# Patient Record
Sex: MALE | Race: WHITE | NOT HISPANIC OR LATINO | Employment: OTHER | URBAN - METROPOLITAN AREA
[De-identification: names, ages, dates, MRNs, and addresses within clinical notes are randomized per-mention and may not be internally consistent; named-entity substitution may affect disease eponyms.]

---

## 2018-09-19 ENCOUNTER — OFFICE VISIT CONVERTED (OUTPATIENT)
Dept: FAMILY MEDICINE CLINIC | Facility: CLINIC | Age: 78
End: 2018-09-19
Attending: FAMILY MEDICINE

## 2018-09-19 ENCOUNTER — CONVERSION ENCOUNTER (OUTPATIENT)
Dept: FAMILY MEDICINE CLINIC | Facility: CLINIC | Age: 78
End: 2018-09-19

## 2018-12-19 ENCOUNTER — OFFICE VISIT CONVERTED (OUTPATIENT)
Dept: FAMILY MEDICINE CLINIC | Facility: CLINIC | Age: 78
End: 2018-12-19
Attending: FAMILY MEDICINE

## 2018-12-19 ENCOUNTER — CONVERSION ENCOUNTER (OUTPATIENT)
Dept: FAMILY MEDICINE CLINIC | Facility: CLINIC | Age: 78
End: 2018-12-19

## 2019-03-25 ENCOUNTER — CONVERSION ENCOUNTER (OUTPATIENT)
Dept: FAMILY MEDICINE CLINIC | Facility: CLINIC | Age: 79
End: 2019-03-25

## 2019-03-25 ENCOUNTER — OFFICE VISIT CONVERTED (OUTPATIENT)
Dept: FAMILY MEDICINE CLINIC | Facility: CLINIC | Age: 79
End: 2019-03-25
Attending: FAMILY MEDICINE

## 2019-08-07 ENCOUNTER — HOSPITAL ENCOUNTER (OUTPATIENT)
Dept: SURGERY | Facility: HOSPITAL | Age: 79
Setting detail: HOSPITAL OUTPATIENT SURGERY
Discharge: HOME | End: 2019-08-07
Attending: OPHTHALMOLOGY

## 2019-08-21 ENCOUNTER — HOSPITAL ENCOUNTER (OUTPATIENT)
Dept: SURGERY | Facility: HOSPITAL | Age: 79
Setting detail: HOSPITAL OUTPATIENT SURGERY
Discharge: HOME | End: 2019-08-21
Attending: OPHTHALMOLOGY

## 2020-07-06 ENCOUNTER — HOSPITAL ENCOUNTER (OUTPATIENT)
Dept: FAMILY MEDICINE CLINIC | Facility: CLINIC | Age: 80
Discharge: HOME | End: 2020-07-06
Attending: FAMILY MEDICINE

## 2020-07-06 ENCOUNTER — OFFICE VISIT CONVERTED (OUTPATIENT)
Dept: FAMILY MEDICINE CLINIC | Facility: CLINIC | Age: 80
End: 2020-07-06
Attending: FAMILY MEDICINE

## 2020-07-06 LAB — URATE SERPL-MCNC: 4.8 MG/DL (ref 3.5–8.5)

## 2020-07-07 LAB
ALBUMIN SERPL-MCNC: 4.5 G/DL (ref 3.5–5)
ALBUMIN/GLOB SERPL: 1.4 {RATIO} (ref 1.4–2.6)
ALP SERPL-CCNC: 73 U/L (ref 56–155)
ALT SERPL-CCNC: 23 U/L (ref 10–40)
ANION GAP SERPL CALC-SCNC: 20 MMOL/L (ref 8–19)
AST SERPL-CCNC: 20 U/L (ref 15–50)
BASOPHILS # BLD AUTO: 0.1 10*3/UL (ref 0–0.2)
BASOPHILS NFR BLD AUTO: 0.9 % (ref 0–3)
BILIRUB SERPL-MCNC: 0.69 MG/DL (ref 0.2–1.3)
BUN SERPL-MCNC: 36 MG/DL (ref 5–25)
BUN/CREAT SERPL: 18 {RATIO} (ref 6–20)
CALCIUM SERPL-MCNC: 10.2 MG/DL (ref 8.7–10.4)
CHLORIDE SERPL-SCNC: 97 MMOL/L (ref 99–111)
CHOLEST SERPL-MCNC: 148 MG/DL (ref 107–200)
CHOLEST/HDLC SERPL: 3 {RATIO} (ref 3–6)
CONV ABS IMM GRAN: 0.08 10*3/UL (ref 0–0.2)
CONV CO2: 25 MMOL/L (ref 22–32)
CONV IMMATURE GRAN: 0.7 % (ref 0–1.8)
CONV TOTAL PROTEIN: 7.8 G/DL (ref 6.3–8.2)
CREAT UR-MCNC: 1.97 MG/DL (ref 0.7–1.2)
DEPRECATED RDW RBC AUTO: 52.5 FL (ref 35.1–43.9)
EOSINOPHIL # BLD AUTO: 0.46 10*3/UL (ref 0–0.7)
EOSINOPHIL # BLD AUTO: 4.2 % (ref 0–7)
ERYTHROCYTE [DISTWIDTH] IN BLOOD BY AUTOMATED COUNT: 14.1 % (ref 11.6–14.4)
GFR SERPLBLD BASED ON 1.73 SQ M-ARVRAT: 32 ML/MIN/{1.73_M2}
GLOBULIN UR ELPH-MCNC: 3.3 G/DL (ref 2–3.5)
GLUCOSE SERPL-MCNC: 122 MG/DL (ref 70–99)
HCT VFR BLD AUTO: 46.1 % (ref 42–52)
HDLC SERPL-MCNC: 49 MG/DL (ref 40–60)
HGB BLD-MCNC: 14.8 G/DL (ref 14–18)
LDLC SERPL CALC-MCNC: 74 MG/DL (ref 70–100)
LYMPHOCYTES # BLD AUTO: 2.1 10*3/UL (ref 1–5)
LYMPHOCYTES NFR BLD AUTO: 19.1 % (ref 20–45)
MCH RBC QN AUTO: 32.5 PG (ref 27–31)
MCHC RBC AUTO-ENTMCNC: 32.1 G/DL (ref 33–37)
MCV RBC AUTO: 101.3 FL (ref 80–96)
MONOCYTES # BLD AUTO: 0.69 10*3/UL (ref 0.2–1.2)
MONOCYTES NFR BLD AUTO: 6.3 % (ref 3–10)
NEUTROPHILS # BLD AUTO: 7.56 10*3/UL (ref 2–8)
NEUTROPHILS NFR BLD AUTO: 68.8 % (ref 30–85)
NRBC CBCN: 0 % (ref 0–0.7)
OSMOLALITY SERPL CALC.SUM OF ELEC: 294 MOSM/KG (ref 273–304)
PLATELET # BLD AUTO: 245 10*3/UL (ref 130–400)
PMV BLD AUTO: 11.4 FL (ref 9.4–12.4)
POTASSIUM SERPL-SCNC: 4.5 MMOL/L (ref 3.5–5.3)
PSA SERPL-MCNC: 2.56 NG/ML (ref 0–4)
RBC # BLD AUTO: 4.55 10*6/UL (ref 4.7–6.1)
SODIUM SERPL-SCNC: 137 MMOL/L (ref 135–147)
TRIGL SERPL-MCNC: 125 MG/DL (ref 40–150)
TSH SERPL-ACNC: 1.97 M[IU]/L (ref 0.27–4.2)
VLDLC SERPL-MCNC: 25 MG/DL (ref 5–37)
WBC # BLD AUTO: 10.99 10*3/UL (ref 4.8–10.8)

## 2020-07-14 ENCOUNTER — HOSPITAL ENCOUNTER (OUTPATIENT)
Dept: ULTRASOUND IMAGING | Facility: HOSPITAL | Age: 80
Discharge: HOME | End: 2020-07-14
Attending: FAMILY MEDICINE

## 2021-03-30 ENCOUNTER — HOSPITAL ENCOUNTER (OUTPATIENT)
Dept: FAMILY MEDICINE CLINIC | Facility: CLINIC | Age: 81
Discharge: HOME | End: 2021-03-30
Attending: FAMILY MEDICINE

## 2021-03-30 ENCOUNTER — OFFICE VISIT CONVERTED (OUTPATIENT)
Dept: FAMILY MEDICINE CLINIC | Facility: CLINIC | Age: 81
End: 2021-03-30
Attending: FAMILY MEDICINE

## 2021-03-31 LAB
TSH SERPL-ACNC: 1.27 M[IU]/L (ref 0.27–4.2)
VIT B12 SERPL-MCNC: 1604 PG/ML (ref 211–911)

## 2021-04-06 ENCOUNTER — HOSPITAL ENCOUNTER (OUTPATIENT)
Dept: GENERAL RADIOLOGY | Facility: HOSPITAL | Age: 81
Discharge: HOME | End: 2021-04-06
Attending: FAMILY MEDICINE

## 2021-04-16 ENCOUNTER — HOSPITAL ENCOUNTER (OUTPATIENT)
Dept: GENERAL RADIOLOGY | Facility: HOSPITAL | Age: 81
Discharge: HOME | End: 2021-04-16
Attending: FAMILY MEDICINE

## 2021-04-16 LAB
CREAT BLD-MCNC: 1.4 MG/DL (ref 0.6–1.4)
GFR SERPLBLD BASED ON 1.73 SQ M-ARVRAT: 48 ML/MIN/{1.73_M2}

## 2021-04-24 ENCOUNTER — HOSPITAL ENCOUNTER (OUTPATIENT)
Dept: OTHER | Facility: HOSPITAL | Age: 81
Discharge: HOME | End: 2021-04-24
Attending: OTOLARYNGOLOGY

## 2021-04-30 ENCOUNTER — HOSPITAL ENCOUNTER (OUTPATIENT)
Dept: GENERAL RADIOLOGY | Facility: HOSPITAL | Age: 81
Discharge: HOME | End: 2021-04-30
Attending: OTOLARYNGOLOGY

## 2021-05-13 NOTE — PROGRESS NOTES
Progress Note      Patient Name: Clark Layton Jr   Patient ID: [id number]   Sex: Male   YOB: 1940    Primary Care Provider: David Singh III MD   Referring Provider: David Singh III MD    Visit Date: July 6, 2020    Provider: David Singh III MD   Location: [facility]   Location Address: [address] [place] [address]  [id number]   Location Phone: [phone]          Chief Complaint  · Adult General Male Physical Exam      History Of Present Illness  Clark Layton Jr is a 79 year old /White male who presents for evaluation and treatment of:   Removal of Ear Wax  Ear lavage for left ear after unsuccessful attempt of removal of wax plug with curette and/or cotton swab.   Ear flushed with warm water until wax plug flushed out. Provider re-examined ear and removed remaining wax with curette and dry cotton swab.      no medication bottles brought today      HPI     patient is a 79-year-old here for his regular checkup and yearly lab work.  Pt has history of COPD, he continues to smoke, gout and hypertension well controlled on Exforge and hydrochlorothiazide.      Review of systems     cardiovascular no chest pain no palpitation   respiratory no shortness of breath dyspnea on exertion patient does not want to stop smoking.  Respiratory no increased shortness of breath.    GI no problems with his with bleeding.  No abdominal pain.  Musculoskeletal system right cyst.   has urinary frequency at night has to get up for 5 times some nights.  For 6 to 9 months.  No dysuria.  No decreased stream.    Physical exam     weight is 160 this is a 7 pound weight loss, blood pressure is 120/58, temperature is 97, pulse ox is 93%, heart rate is 61  General no acute distress  ENT left cerumen impaction lavaged out here in the office.  Neck no adenopathy thyromegaly  Respiratory no increased work of breathing lungs clear and equal bilaterally no rales no rhonchi no  wheezes  Cardiovascular regular rhythm no murmur.  Abdomen soft nontender no liver enlargement no spleen enlargement   aorta here in the office measures 2.1 cm.  Rectal 1+ prostate hypertrophy no asymmetry no firmness that is unusual no thickening no nodules patient does have some urinary retention.  Only a small amount    Assessment/Plan    #1 hypertension well controlled   #2 COPD still continues to smoke but he does not want to quit   #3 BPH with some urinary retention needs to start both Proscar and tamsulosin will get a PSA.    #4 cerumen impaction lavaged out here in the office only on the left side     blood work is pending.       Past Medical History  Disease Name Date Onset Notes   23-polyvalent pneumococcal polysaccharide vaccine declined 12/19/2018 --    Gout --  --    High cholesterol 12/19/2018 --    Hypertension, essential --  --    Influenza vaccination declined 12/19/2018 --    Medication management 12/19/2018 --    Nicotine dependence, cigarettes, with other nicotine-induced disorders 09/19/2018 --    Pneumococcal vaccination declined 12/19/2018 --          Past Surgical History  Procedure Name Date Notes   *Denies any surgical procedures --  --          Medication List  Name Date Started Instructions   allopurinol 300 mg oral tablet 04/29/2020 TAKE ONE TABLET BY MOUTH DAILY **MUST CALL FOR APPOINTMENT FOR FURTHER REFILLS**   Exforge  mg oral tablet 06/10/2020 TAKE ONE TABLET BY MOUTH DAILY   hydrochlorothiazide 25 mg oral tablet 06/08/2020 TAKE ONE TABLET BY MOUTH DAILY         Allergy List  Allergen Name Date Reaction Notes   NO KNOWN DRUG ALLERGIES --  --  --        Allergies Reconciled  Family Medical History  Disease Name Relative/Age Notes   Brain Neoplasm, Malignant Brother/   --    Heart Disease Father/   --          Social History  Finding Status Start/Stop Quantity Notes   Active but no formal exercise --  --/-- --  --    Advance directive declined by patient --  --/-- --  --   "  Alcohol Current some day --/-- --  --    Tobacco Current every day 20/-- 1 p[k QD 10/29/2019 - patient declined LDCT scan, states feels fine does not want          Immunizations  NameDate Admin Mfg Trade Name Lot Number Route Inj VIS Given VIS Publication   Hepatitis ARefused 12/19/2018 NE Not Entered  NE NE     Comments:    InfluenzaRefused 12/19/2018 NE Not Entered  NE NE     Comments:    [id number] 12/19/2018 NE Not Entered  NE NE     Comments:    Prevnar 13Refused 12/19/2018 NE Not Entered  NE NE     Comments:    ShinglesRefused 12/19/2018 NE Not Entered  NE NE     Comments:          Review of Systems  · Constitutional  o * See HPI  · Eyes  o * See HPI  · HENT  o * See HPI  · Breasts  o * See HPI  · Cardiovascular  o * See HPI  · Respiratory  o * See HPI  · Gastrointestinal  o * See HPI  · Genitourinary  o * See HPI  · Integument  o * See HPI  · Neurologic  o * See HPI  · Musculoskeletal  o * See HPI  · Endocrine  o * See HPI  · Psychiatric  o * See HPI  · Heme-Lymph  o * See HPI  · Allergic-Immunologic  o * See HPI      Vitals  Date Time BP Position Site L\R Cuff Size HR RR TEMP (F) WT  HT  BMI kg/m2 BSA m2 O2 Sat        07/06/2020 03:46 /74 Sitting    61 - R  97.6 160lbs 0oz 5'  7\" 25.06 1.85 93 %              Results  · In-Office Procedures  o Lab procedure  § IOP - Urinalysis without Microscopy (Clinitek) [facility] ([id number])   § Color Ur: Yellow   § Clarity Ur: Clear   § Glucose Ur Ql Strip: Negative   § Bilirub Ur Ql Strip: Small   § Ketones Ur Ql Strip: Negative   § Sp Gr Ur Qn: 1.020   § Hgb Ur Ql Strip: Negative   § pH Ur-LsCnc: 5.5   § Prot Ur Ql Strip: 30 mg/dL   § Urobilinogen Ur Strip-mCnc: 0.2 E.U./dL   § Nitrite Ur Ql Strip: Negative   § WBC Est Ur Ql Strip: Trace       Assessment  · General Medical Exam, Adult (CPE)     V70.0/Z00.00  · Screening for depression     V79.0/Z13.89  · Screening for colon cancer     V76.51/Z12.11  · Screening for prostate cancer     V76.44/Z12.5  · Screening " for AAA (abdominal aortic aneurysm)     V81.2/Z13.6  · Encounter for screening for cardiovascular disorders     V81.2/Z13.6  · Annual physical exam     V70.0/Z00.00  · BPH (benign prostatic hyperplasia)     600.00/N40.0  · Fatigue     780.79/R53.83  · Gout     274.9/M10.9  · High cholesterol     272.0/E78.00  · Medication management     V58.69/Z79.899  · Nicotine dependence, cigarettes, with other nicotine-induced disorders     292.89/F17.218  · Pneumococcal vaccination declined     V64.06/Z28.21  · Hypertension, essential     401.9/I10  · Urinary urgency     788.63/R39.15  · Cerumen Impaction     380.4/H61.20    Problems Reconciled  Plan  · Orders  o ACO-18: Negative screen for clinical depression using a standardized tool () - V79.0/Z13.89 - 07/06/2020  o Cerumen Removal by MA or Nurse, Unilateral [facility] ([id number]) - 380.4/H61.20 - 07/06/2020  o ACO - Pt declines to or was not able to provide an Advance Care Plan or name a Surrogate Decision Maker (1124F) - - 07/06/2020  o Male Physical Primary Care Panel (CMP, CBC, TSH, Lipid, PSA) [facility] ([id number], 38120, , 38348, [id number]) - V58.69/Z79.899, V76.44/Z12.5, V81.2/Z13.6, 780.79/R53.83 - 07/06/2020  o ACO-39: Current medications updated and reviewed () - - 07/06/2020  o ACO-18: Negative screen for clinical depression using a standardized tool () - - 07/06/2020  o ACO-13: Fall Risk Screening with no falls in past year or only one fall without injury in the past year (1101F) - - 07/06/2020  o Uric Acid Serum [facility] ([id number]) - 274.9/M10.9, V58.69/Z79.899 - 07/06/2020  o AAA Screening. (, 91028) - 292.89/F17.218 - 07/06/2020   2.1 cm measured by ultrasound in office  · Medications  o Proscar 5 mg oral tablet   SIG: take 1 tablet (5 mg) by oral route once daily for 90 days   DISP: (90) tablets with 1 refills  Prescribed on 07/06/2020     o tamsulosin 0.4 mg oral capsule   SIG: take 1 capsule (0.4 mg) by oral route once daily 1/2 hour following the same meal each  day for 90 days   DISP: (90) capsules with 1 refills  Prescribed on 07/06/2020     o Bystolic 10 mg oral tablet   SIG: take 1/2 tablet by oral route QD for 30 days   DISP: (30) tablets with 3 refills  Discontinued on 07/06/2020     o Proventil HFA 90 mcg/actuation inhalation HFA aerosol inhaler   SIG: inhale 2 puffs by inhalation route every 3 hours prn   DISP: (1) 6.7 gm canister with 3 refills  Discontinued on 07/06/2020     o Medications have been Reconciled  o Transition of Care or Provider Policy  · Instructions  o Depression Screen completed and scanned into the EMR under the designated folder within the patient's documents.  o Today's PHQ-9 result is 1___  o CMS (Medicare) estimates that one in six persons older than 65 suffers from depression and that depression in older patients occurs in 25% of those with other medical illnesses. US Preventative Services Task Force indicates that depression screening and support improves clinical outcomes in older adults. This service is covered by Medicare once a year as part of helping maintain a healthy you!  o The provider screening met the required time of 15 minutes.  o Patient declines colorectal cancer screening. Discussed risks associated with not having screening performed.   o Reviewed health maintenance flowsheet and updated information. Orders were placed and/or patient's response was documented.  o Ear canal(s) irrigated with warm water and cerumen removed with curette.  o Patient is taking medications as prescribed and doing well.   o Take all medications as prescribed/directed.  o Patient instructed/educated on their diet and exercise program.  o Patient was educated/instructed on their diagnosis, treatment and medications prior to discharge from the clinic today.  o Bring all medicines with their bottles to each office visit.  o Time spent with the patient was 45 minutes, more than 50% face to face.  o Chronic conditions reviewed and taken into  consideration for today's treatment plan.  o Flu vaccine declined.  o Pneumonia vaccine declined.   o Discussed Covid-19 precautions including, but not limited to, social distancing, avoid touching your face, and hand washing.             Electronically Signed by: Laney Alexandre, -Author on July 6, 2020 07:02:26 PM  Electronically Co-signed by: David Singh III MD -Reviewer on July 7, 2020 05:25:06 PM

## 2021-05-14 VITALS
WEIGHT: 162 LBS | TEMPERATURE: 97.7 F | SYSTOLIC BLOOD PRESSURE: 132 MMHG | HEART RATE: 74 BPM | HEIGHT: 67 IN | DIASTOLIC BLOOD PRESSURE: 74 MMHG | OXYGEN SATURATION: 92 % | BODY MASS INDEX: 25.43 KG/M2

## 2021-05-14 NOTE — PROGRESS NOTES
Progress Note      Patient Name: Clark Layton Jr   Patient ID: [id number]   Sex: Male   YOB: 1940    Primary Care Provider: David Singh III MD    Visit Date: March 30, 2021    Provider: David Singh III MD   Location: [facility]   Location Address: [address] [place] [address]  [id number]   Location Phone: [phone]          Chief Complaint  · decreased memory      History Of Present Illness  Clark Layton Jr is a 80 year old /White male who presents for evaluation and treatment of: change in memory      HPI patient is a 80-year-old His wife is with him. Sates he forgets things has been told sometimes he pays a bill twice. Seems to be getting a little worse. But still able to function at work. Still reading, still doing things are cognitively difficult like balancing [employer] books.  Just mild cognitive neurologic disorder.  Discussed Aricept and Namenda.  Does not want to take at this time.     Review of systems    Cardiovascular no chest pain no palpitation  Respiratory no shortness of breath dyspnea on exertion discussed stopping smoking would help his cognition neurologic no focal deficits no problems with speech. Does have some problems with short term memory. Has noticed slight increase in this.     Physical exam   weight is 162 heart rate 74 temperature 97.7 blood pressure 132/74 pulse ox 92    General no distress  Psych happy and bright.  Neurologic slum score was 22.  Which is a mild cognitive neurologic disorder.  Speech is normal can converse without difficulty.  Told patient that I would send patient to the neuropsychologist if he wished.    CT scan and a TSH and a B12.      Assessment   mild cognitive neurological disorder   short term memory  Slum score 22       Plan   stop smoking   CT scan TSH and a B12   recheck in July       Past Medical History  Disease Name Date Onset Notes   23-polyvalent pneumococcal  polysaccharide vaccine declined 12/19/2018 --    Gout --  --    High cholesterol 12/19/2018 --    Hypertension, essential --  --    Influenza vaccination declined 12/19/2018 --    Medication management 12/19/2018 --    Nicotine dependence, cigarettes, with other nicotine-induced disorders 09/19/2018 --    Pneumococcal vaccination declined 12/19/2018 --          Past Surgical History  Procedure Name Date Notes   *Denies any surgical procedures --  --          Medication List  Name Date Started Instructions   allopurinol 300 mg oral tablet 08/03/2020 TAKE ONE TABLET BY MOUTH DAILY   amlodipine 10 mg oral tablet 07/16/2020 take 1 tablet (10 mg) by oral route once daily for 90 days   hydrochlorothiazide 25 mg oral tablet 09/21/2020 TAKE ONE TABLET BY MOUTH DAILY   Proscar 5 mg oral tablet 07/06/2020 take 1 tablet (5 mg) by oral route once daily for 90 days   tamsulosin 0.4 mg oral capsule 07/06/2020 take 1 capsule (0.4 mg) by oral route once daily 1/2 hour following the same meal each day for 90 days         Allergy List  Allergen Name Date Reaction Notes   NO KNOWN DRUG ALLERGIES --  --  --        Allergies Reconciled  Family Medical History  Disease Name Relative/Age Notes   Brain Neoplasm, Malignant Brother/   --    Heart Disease Father/   --          Social History  Finding Status Start/Stop Quantity Notes   Active but no formal exercise --  --/-- --  --    Advance directive declined by patient --  --/-- --  --    Alcohol Current some day --/-- --  --    Tobacco Current every day 20/-- 1 p[k QD 10/29/2019 - patient declined LDCT scan, states feels fine does not want          Immunizations  NameDate Admin Mfg Trade Name Lot Number Route Inj VIS Given VIS Publication   Hepatitis ARefused 12/19/2018 NE Not Entered  NE NE     Comments:    InfluenzaRefused 12/19/2018 NE Not Entered  NE NE     Comments:    [id number] 12/19/2018 NE Not Entered  NE NE     Comments:    Prevnar 13Refused 12/19/2018 NE Not Entered  NE  "NE     Comments:    ShinglesRefused 12/19/2018 NE Not Entered  NE NE     Comments:          Vitals  Date Time BP Position Site L\R Cuff Size HR RR TEMP (F) WT  HT  BMI kg/m2 BSA m2 O2 Sat FR L/min FiO2 HC       03/30/2021 11:50 /74 Sitting    74 - R  97.7 162lbs 0oz 5'  7\" 25.37 1.86 92 %  21%                  Assessment  · Fatigue     780.79/R53.83  · Memory changes     780.93/R41.3  · Medication management     V58.69/Z79.899  · Memory loss     780.93/R41.3      Plan  · Orders  o TSH [facility] ([id number]) - 780.93/R41.3, 780.79/R53.83, V58.69/Z79.899 - 03/30/2021  o ACO-17: Screened for tobacco use AND received tobacco cessation intervention (4004F) - - 03/30/2021  o ACO-14: Influenza immunization was not administered for reasons documented [facility] () - - 03/30/2021  o ACO-39: Current medications updated and reviewed (1159F, ) - - 03/30/2021  o B12 level (41742) - 780.93/R41.3, 780.79/R53.83, V58.69/Z79.899 - 03/30/2021  o CT Head/Brain without IV Contrast [facility] ([id number]) - 780.93/R41.3 - 03/30/2021   memory loss needs PA  · Medications  o allopurinol 300 mg oral tablet   SIG: TAKE ONE TABLET BY MOUTH DAILY   DISP: (90) Tablet with 1 refills  Refilled on 03/30/2021     o amlodipine 10 mg oral tablet   SIG: take 1 tablet (10 mg) by oral route once daily for 90 days   DISP: (90) Tablet with 1 refills  Refilled on 03/30/2021     o hydrochlorothiazide 25 mg oral tablet   SIG: TAKE ONE TABLET BY MOUTH DAILY for 90 days   DISP: (90) Tablet with 1 refills  Refilled on 03/30/2021     o Proscar 5 mg oral tablet   SIG: take 1 tablet (5 mg) by oral route once daily for 90 days   DISP: (90) Tablet with 1 refills  Refilled on 03/30/2021     o tamsulosin 0.4 mg oral capsule   SIG: take 1 capsule (0.4 mg) by oral route once daily 1/2 hour following the same meal each day for 90 days   DISP: (90) Capsule with 1 refills  Refilled on 03/30/2021     o Medications have been Reconciled  o Transition of Care or Provider " Policy  · Instructions  o Take all medications as prescribed/directed.  o Patient was educated/instructed on their diagnosis, treatment and medications prior to discharge from the clinic today.  o Patient counseled to stop smoking.  o Patient was instructed to exercise regularly.  o Call the office with any concerns or questions.  o Bring all medicines with their bottles to each office visit.  o Time spent with the patient was minutes, more than 50% face to face.            Electronically Signed by: Lisa Hsasan, -Author on March 30, 2021 05:06:24 PM  Electronically Co-signed by: David Singh III MD -Reviewer on March 30, 2021 05:59:15 PM

## 2021-05-15 VITALS
TEMPERATURE: 97.6 F | OXYGEN SATURATION: 93 % | BODY MASS INDEX: 25.11 KG/M2 | DIASTOLIC BLOOD PRESSURE: 74 MMHG | HEART RATE: 61 BPM | HEIGHT: 67 IN | SYSTOLIC BLOOD PRESSURE: 144 MMHG | WEIGHT: 160 LBS

## 2021-05-15 VITALS
DIASTOLIC BLOOD PRESSURE: 80 MMHG | BODY MASS INDEX: 26.21 KG/M2 | HEIGHT: 67 IN | WEIGHT: 167 LBS | SYSTOLIC BLOOD PRESSURE: 140 MMHG

## 2021-05-16 VITALS
HEIGHT: 67 IN | DIASTOLIC BLOOD PRESSURE: 60 MMHG | SYSTOLIC BLOOD PRESSURE: 120 MMHG | BODY MASS INDEX: 26.37 KG/M2 | WEIGHT: 168 LBS

## 2021-05-16 VITALS
WEIGHT: 165 LBS | HEIGHT: 67 IN | DIASTOLIC BLOOD PRESSURE: 78 MMHG | SYSTOLIC BLOOD PRESSURE: 142 MMHG | BODY MASS INDEX: 25.9 KG/M2

## 2021-07-21 PROBLEM — Z79.899 MEDICATION MANAGEMENT: Status: ACTIVE | Noted: 2018-12-19

## 2021-07-21 PROBLEM — F17.218 NICOTINE DEPENDENCE, CIGARETTES, WITH OTHER NICOTINE-INDUCED DISORDERS: Status: ACTIVE | Noted: 2018-09-19

## 2021-07-21 PROBLEM — E78.00 HIGH CHOLESTEROL: Status: ACTIVE | Noted: 2018-12-19

## 2021-07-21 RX ORDER — ALLOPURINOL 300 MG/1
300 TABLET ORAL DAILY
COMMUNITY
End: 2021-07-22 | Stop reason: SDUPTHER

## 2021-07-21 RX ORDER — AMLODIPINE BESYLATE 10 MG/1
10 TABLET ORAL DAILY
COMMUNITY
End: 2021-07-22 | Stop reason: SDUPTHER

## 2021-07-21 RX ORDER — FINASTERIDE 5 MG/1
5 TABLET, FILM COATED ORAL DAILY
COMMUNITY
End: 2021-07-22 | Stop reason: SDUPTHER

## 2021-07-21 RX ORDER — TAMSULOSIN HYDROCHLORIDE 0.4 MG/1
1 CAPSULE ORAL DAILY
COMMUNITY
End: 2021-07-22

## 2021-07-21 RX ORDER — HYDROCHLOROTHIAZIDE 25 MG/1
25 TABLET ORAL DAILY
COMMUNITY
End: 2021-07-22 | Stop reason: SDUPTHER

## 2021-07-22 ENCOUNTER — OFFICE VISIT (OUTPATIENT)
Dept: FAMILY MEDICINE CLINIC | Facility: CLINIC | Age: 81
End: 2021-07-22

## 2021-07-22 VITALS
TEMPERATURE: 98.3 F | BODY MASS INDEX: 24.48 KG/M2 | DIASTOLIC BLOOD PRESSURE: 60 MMHG | HEIGHT: 67 IN | HEART RATE: 88 BPM | WEIGHT: 156 LBS | SYSTOLIC BLOOD PRESSURE: 130 MMHG | OXYGEN SATURATION: 87 %

## 2021-07-22 DIAGNOSIS — Z13.6 ENCOUNTER FOR SCREENING FOR CARDIOVASCULAR DISORDERS: ICD-10-CM

## 2021-07-22 DIAGNOSIS — Z79.899 MEDICATION MANAGEMENT: ICD-10-CM

## 2021-07-22 DIAGNOSIS — Z00.00 ANNUAL PHYSICAL EXAM: Primary | ICD-10-CM

## 2021-07-22 DIAGNOSIS — I10 ESSENTIAL HYPERTENSION: ICD-10-CM

## 2021-07-22 LAB
ALBUMIN SERPL-MCNC: 4.5 G/DL (ref 3.5–5.2)
ALBUMIN/GLOB SERPL: 1.5 G/DL
ALP SERPL-CCNC: 71 U/L (ref 39–117)
ALT SERPL W P-5'-P-CCNC: 17 U/L (ref 1–41)
ANION GAP SERPL CALCULATED.3IONS-SCNC: 11.3 MMOL/L (ref 5–15)
AST SERPL-CCNC: 17 U/L (ref 1–40)
BASOPHILS # BLD AUTO: 0.09 10*3/MM3 (ref 0–0.2)
BASOPHILS NFR BLD AUTO: 0.8 % (ref 0–1.5)
BILIRUB SERPL-MCNC: 0.6 MG/DL (ref 0–1.2)
BUN SERPL-MCNC: 22 MG/DL (ref 8–23)
BUN/CREAT SERPL: 17.1 (ref 7–25)
CALCIUM SPEC-SCNC: 9.9 MG/DL (ref 8.6–10.5)
CHLORIDE SERPL-SCNC: 96 MMOL/L (ref 98–107)
CHOLEST SERPL-MCNC: 157 MG/DL (ref 0–200)
CO2 SERPL-SCNC: 29.7 MMOL/L (ref 22–29)
CREAT SERPL-MCNC: 1.29 MG/DL (ref 0.76–1.27)
DEPRECATED RDW RBC AUTO: 48.5 FL (ref 37–54)
EOSINOPHIL # BLD AUTO: 0.35 10*3/MM3 (ref 0–0.4)
EOSINOPHIL NFR BLD AUTO: 3 % (ref 0.3–6.2)
ERYTHROCYTE [DISTWIDTH] IN BLOOD BY AUTOMATED COUNT: 13.3 % (ref 12.3–15.4)
GFR SERPL CREATININE-BSD FRML MDRD: 54 ML/MIN/1.73
GLOBULIN UR ELPH-MCNC: 3.1 GM/DL
GLUCOSE SERPL-MCNC: 101 MG/DL (ref 65–99)
HCT VFR BLD AUTO: 49.4 % (ref 37.5–51)
HDLC SERPL-MCNC: 47 MG/DL (ref 40–60)
HGB BLD-MCNC: 16.4 G/DL (ref 13–17.7)
IMM GRANULOCYTES # BLD AUTO: 0.06 10*3/MM3 (ref 0–0.05)
IMM GRANULOCYTES NFR BLD AUTO: 0.5 % (ref 0–0.5)
LDLC SERPL CALC-MCNC: 94 MG/DL (ref 0–100)
LDLC/HDLC SERPL: 1.98 {RATIO}
LYMPHOCYTES # BLD AUTO: 2.21 10*3/MM3 (ref 0.7–3.1)
LYMPHOCYTES NFR BLD AUTO: 18.8 % (ref 19.6–45.3)
MCH RBC QN AUTO: 32.6 PG (ref 26.6–33)
MCHC RBC AUTO-ENTMCNC: 33.2 G/DL (ref 31.5–35.7)
MCV RBC AUTO: 98.2 FL (ref 79–97)
MONOCYTES # BLD AUTO: 0.67 10*3/MM3 (ref 0.1–0.9)
MONOCYTES NFR BLD AUTO: 5.7 % (ref 5–12)
NEUTROPHILS NFR BLD AUTO: 71.2 % (ref 42.7–76)
NEUTROPHILS NFR BLD AUTO: 8.37 10*3/MM3 (ref 1.7–7)
NRBC BLD AUTO-RTO: 0 /100 WBC (ref 0–0.2)
PLATELET # BLD AUTO: 222 10*3/MM3 (ref 140–450)
PMV BLD AUTO: 10.8 FL (ref 6–12)
POTASSIUM SERPL-SCNC: 3.7 MMOL/L (ref 3.5–5.2)
PROT SERPL-MCNC: 7.6 G/DL (ref 6–8.5)
RBC # BLD AUTO: 5.03 10*6/MM3 (ref 4.14–5.8)
SODIUM SERPL-SCNC: 137 MMOL/L (ref 136–145)
TRIGL SERPL-MCNC: 84 MG/DL (ref 0–150)
TSH SERPL DL<=0.05 MIU/L-ACNC: 1.05 UIU/ML (ref 0.27–4.2)
VLDLC SERPL-MCNC: 16 MG/DL (ref 5–40)
WBC # BLD AUTO: 11.75 10*3/MM3 (ref 3.4–10.8)

## 2021-07-22 PROCEDURE — 85025 COMPLETE CBC W/AUTO DIFF WBC: CPT | Performed by: FAMILY MEDICINE

## 2021-07-22 PROCEDURE — 99214 OFFICE O/P EST MOD 30 MIN: CPT | Performed by: FAMILY MEDICINE

## 2021-07-22 PROCEDURE — 84443 ASSAY THYROID STIM HORMONE: CPT | Performed by: FAMILY MEDICINE

## 2021-07-22 PROCEDURE — 80053 COMPREHEN METABOLIC PANEL: CPT | Performed by: FAMILY MEDICINE

## 2021-07-22 PROCEDURE — 80061 LIPID PANEL: CPT | Performed by: FAMILY MEDICINE

## 2021-07-22 RX ORDER — AMLODIPINE BESYLATE 10 MG/1
10 TABLET ORAL DAILY
Qty: 90 TABLET | Refills: 3 | Status: SHIPPED | OUTPATIENT
Start: 2021-07-22 | End: 2022-07-26

## 2021-07-22 RX ORDER — ALLOPURINOL 300 MG/1
300 TABLET ORAL DAILY
Qty: 90 TABLET | Refills: 3 | Status: SHIPPED | OUTPATIENT
Start: 2021-07-22 | End: 2022-07-27 | Stop reason: SDUPTHER

## 2021-07-22 RX ORDER — HYDROCHLOROTHIAZIDE 25 MG/1
25 TABLET ORAL DAILY
Qty: 90 TABLET | Refills: 3 | Status: SHIPPED | OUTPATIENT
Start: 2021-07-22 | End: 2022-07-27 | Stop reason: SDUPTHER

## 2021-07-22 RX ORDER — FINASTERIDE 5 MG/1
5 TABLET, FILM COATED ORAL DAILY
Qty: 90 TABLET | Refills: 3 | Status: SHIPPED | OUTPATIENT
Start: 2021-07-22 | End: 2022-07-27

## 2021-07-23 NOTE — PROGRESS NOTES
Chief Complaint  Annual Exam and Med Refill    Subjective          Clark Layton presents to [facility]  History of Present Illness  80-year-old hypertension smokes gout mild cognitive impairment hyperlipidemia here for his physical exam BPH    No Known Allergies     No past surgical history on file.    Social History     Tobacco Use   • Smoking status: Current Every Day Smoker     Packs/day: 1.00     Years: 50.00     Pack years: 50.00     Start date: 1962   • Smokeless tobacco: Never Used   • Tobacco comment: pt declines LDCT scan, states feels fine does not want   Substance Use Topics   • Alcohol use: Yes     Alcohol/week: 7.0 standard drinks     Types: 7 Shots of liquor per week     Comment: Current some day       Family History   Problem Relation Age of Onset   • Heart disease Father    • Brain cancer Brother         Health Maintenance Due   Topic Date Due   • ANNUAL PHYSICAL  Never done   • Pneumococcal Vaccine 65+ (1 of 2 - PPSV23) Never done   • TDAP/TD VACCINES (1 - Tdap) Never done   • ZOSTER VACCINE (1 of 2) Never done   • LUNG CANCER SCREENING  Never done   • HEPATITIS C SCREENING  Never done      Last Completed Colonoscopy     This patient has no relevant Health Maintenance data.          Review of Systems   Cardiovascular no chest pain no palpitations  Respiratory patient has no interest in stopping smoking no particular problems with dyspnea on exertion or shortness of breath  Musculoskeletal no ataxia gout  Neurologic wife says forgetfulness is increasing no particular problems they are unable to handle driving without any problem by the wife's history discussed driving with her discussed medicines for dementia discussed there lack of any significant success usually she nor the patient want to take extra medicine  Objective     Vitals:    07/22/21 1653   BP: 130/60   Pulse: 88   Temp: 98.3 °F (36.8 °C)   SpO2: (!) 87%   Weight: 70.8 kg (156 lb)   Height: 170.2 cm  "(67\")     Body mass index is 24.43 kg/m².      Physical Exam  General no distress does appear cognition has changed slightly  HEENT sclera and conjunctiva are clear TMs are negative no oral lesions  Neck no adenopathy no thyromegaly  Cardiovascular regular rhythm no murmur  Abdomen soft nontender no abnormal pulsations no hepatosplenomegaly  Respiratory decreased breath sounds no wheezes no rhonchi no rales no increased work of breathing  Skin no lesions  Neurologic mentation patient can answer simple questions gait is normal speech is normal  Rectal 1+ prostate hypertrophy no nodules no masses  Musculoskeletal good internal and external rotation of his hips no significant crepitations of his knees feet are negative  Result Review :              Assessment and Plan    Hypertension controlled mild cognitive impairment smokes no interested in stopping COPD due to smoking state screen shows mild BPH gout is controlled continue allopurinol continue Proscar for BPH should recheck in 6 months                Patient was given instructions and counseling regarding his condition or for health maintenance advice. Please see specific information pulled into the AVS if appropriate.     "

## 2021-10-28 ENCOUNTER — TELEPHONE (OUTPATIENT)
Dept: FAMILY MEDICINE CLINIC | Facility: CLINIC | Age: 81
End: 2021-10-28

## 2021-12-02 ENCOUNTER — TELEPHONE (OUTPATIENT)
Dept: FAMILY MEDICINE CLINIC | Facility: CLINIC | Age: 81
End: 2021-12-02

## 2021-12-02 NOTE — TELEPHONE ENCOUNTER
Caller: PATRIZIA THOMAS    Relationship: Emergency Contact    Best call back number: [phone]    What is the best time to reach you: ANYTIME    Who are you requesting to speak with (clinical staff, provider,  specific staff member): CLINICAL      What was the call regarding: THE PATIENT'S WIFE NEEDS PCP TO CALL THE PATIENT AND TELL HIM HE IS NOT ALLOWED TO DRIVE. SHE WOULD LIKE THIS TO BE DONE TODAY 12/02/2021     Do you require a callback: YES

## 2022-02-23 ENCOUNTER — OFFICE VISIT (OUTPATIENT)
Dept: FAMILY MEDICINE CLINIC | Facility: CLINIC | Age: 82
End: 2022-02-23

## 2022-02-23 VITALS
OXYGEN SATURATION: 98 % | BODY MASS INDEX: 24.01 KG/M2 | DIASTOLIC BLOOD PRESSURE: 58 MMHG | TEMPERATURE: 97.8 F | HEART RATE: 54 BPM | SYSTOLIC BLOOD PRESSURE: 130 MMHG | WEIGHT: 153 LBS | HEIGHT: 67 IN

## 2022-02-23 DIAGNOSIS — F03.90 DEMENTIA WITHOUT BEHAVIORAL DISTURBANCE, UNSPECIFIED DEMENTIA TYPE: Primary | ICD-10-CM

## 2022-02-23 PROCEDURE — 99214 OFFICE O/P EST MOD 30 MIN: CPT | Performed by: FAMILY MEDICINE

## 2022-02-23 RX ORDER — TAMSULOSIN HYDROCHLORIDE 0.4 MG/1
1 CAPSULE ORAL DAILY
COMMUNITY
End: 2022-04-18

## 2022-02-23 NOTE — PROGRESS NOTES
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    Social History     Tobacco Use   • Smoking status: Current Every Day Smoker     Packs/day: 1.00     Years: 50.00     Pack years: 50.00     Start date: 1962   • Smokeless tobacco: Never Used   • Tobacco comment: pt declines LDCT scan, states feels fine does not want   Substance Use Topics   • Alcohol use: Not Currently     Alcohol/week: 7.0 standard drinks     Types: 7 Shots of liquor per week     Comment: Current some day, wife states that he is not drinking she puts water in the vodka container but he is not aware of this.       Family History   Problem Relation Age of Onset   • Heart disease Father    • Brain cancer Brother         Health Maintenance Due   Topic Date Due   • ANNUAL PHYSICAL  Never done   • Pneumococcal Vaccine 65+ (1 of 2 - PPSV23) Never done   • TDAP/TD VACCINES (1 - Tdap) Never done   • ZOSTER VACCINE (1 of 2) Never done   • LUNG CANCER SCREENING  Never done   • HEPATITIS C SCREENING  Never done        Last Completed Colonoscopy     This patient has no relevant Health Maintenance data.          REVIEW OF SYSTEMS    Neurologic patient says he can drive and that he has no memory problems told him we can evaluate this again previously 22    OBJECTIVE  Vitals:    02/23/22 1123   BP: 130/58   Pulse: 54   Temp: 97.8 °F (36.6 °C)   SpO2: 98%   Weight: 69.4 kg (153 lb)   Height: 170.2 cm (67\")     Body mass index is 23.96 kg/m².    PHYSICAL EXAM    General no distress  Cardiovascular " regular rhythm no murmur  Lungs clear and equal bilaterally  Neurologic patient can answer simple question his slum today is 19 Ms. Jessica has dementia speech is normal gait is normal clock is normal    ASSESSMENT & PLAN  Diagnoses and all orders for this visit:    1. Dementia without behavioral disturbance, unspecified dementia type (HCC) (Primary)          Dementia Alzheimer's type mild toward moderate needs not to drive  Time spent 35 minutes      Patient was given instructions and counseling regarding his condition or for health maintenance advice. Please see specific information pulled into the AVS if appropriate.

## 2022-04-15 DIAGNOSIS — N40.0 BENIGN PROSTATIC HYPERPLASIA, UNSPECIFIED WHETHER LOWER URINARY TRACT SYMPTOMS PRESENT: Primary | ICD-10-CM

## 2022-04-18 RX ORDER — TAMSULOSIN HYDROCHLORIDE 0.4 MG/1
CAPSULE ORAL
Qty: 90 CAPSULE | Refills: 0 | Status: SHIPPED | OUTPATIENT
Start: 2022-04-18 | End: 2022-07-26

## 2022-07-26 DIAGNOSIS — N40.0 BENIGN PROSTATIC HYPERPLASIA, UNSPECIFIED WHETHER LOWER URINARY TRACT SYMPTOMS PRESENT: ICD-10-CM

## 2022-07-26 RX ORDER — TAMSULOSIN HYDROCHLORIDE 0.4 MG/1
CAPSULE ORAL
Qty: 90 CAPSULE | Refills: 0 | Status: SHIPPED | OUTPATIENT
Start: 2022-07-26 | End: 2022-07-27 | Stop reason: SDUPTHER

## 2022-07-26 RX ORDER — AMLODIPINE BESYLATE 10 MG/1
TABLET ORAL
Qty: 90 TABLET | Refills: 3 | Status: SHIPPED | OUTPATIENT
Start: 2022-07-26

## 2022-07-27 ENCOUNTER — OFFICE VISIT (OUTPATIENT)
Dept: FAMILY MEDICINE CLINIC | Facility: CLINIC | Age: 82
End: 2022-07-27

## 2022-07-27 VITALS
SYSTOLIC BLOOD PRESSURE: 138 MMHG | HEART RATE: 79 BPM | DIASTOLIC BLOOD PRESSURE: 58 MMHG | TEMPERATURE: 97.8 F | WEIGHT: 149 LBS | HEIGHT: 67 IN | OXYGEN SATURATION: 97 % | BODY MASS INDEX: 23.39 KG/M2

## 2022-07-27 DIAGNOSIS — I10 ESSENTIAL HYPERTENSION: ICD-10-CM

## 2022-07-27 DIAGNOSIS — Z12.2 ENCOUNTER FOR SCREENING FOR LUNG CANCER: ICD-10-CM

## 2022-07-27 DIAGNOSIS — M1A.9XX1 CHRONIC GOUT WITH TOPHUS, UNSPECIFIED CAUSE, UNSPECIFIED SITE: ICD-10-CM

## 2022-07-27 DIAGNOSIS — Z00.00 LABORATORY EXAM ORDERED AS PART OF ROUTINE GENERAL MEDICAL EXAMINATION: ICD-10-CM

## 2022-07-27 DIAGNOSIS — F17.218 NICOTINE DEPENDENCE, CIGARETTES, WITH OTHER NICOTINE-INDUCED DISORDERS: ICD-10-CM

## 2022-07-27 DIAGNOSIS — E78.00 HIGH CHOLESTEROL: ICD-10-CM

## 2022-07-27 DIAGNOSIS — Z13.220 ENCOUNTER FOR LIPID SCREENING FOR CARDIOVASCULAR DISEASE: ICD-10-CM

## 2022-07-27 DIAGNOSIS — Z79.899 MEDICATION MANAGEMENT: ICD-10-CM

## 2022-07-27 DIAGNOSIS — N40.0 BENIGN PROSTATIC HYPERPLASIA, UNSPECIFIED WHETHER LOWER URINARY TRACT SYMPTOMS PRESENT: ICD-10-CM

## 2022-07-27 DIAGNOSIS — F17.210 NICOTINE DEPENDENCE, CIGARETTES, UNCOMPLICATED: ICD-10-CM

## 2022-07-27 DIAGNOSIS — Z53.20 COLONOSCOPY REFUSED: ICD-10-CM

## 2022-07-27 DIAGNOSIS — Z00.00 ANNUAL PHYSICAL EXAM: Primary | ICD-10-CM

## 2022-07-27 DIAGNOSIS — Z11.59 ENCOUNTER FOR HEPATITIS C SCREENING TEST FOR LOW RISK PATIENT: ICD-10-CM

## 2022-07-27 DIAGNOSIS — Z13.6 ENCOUNTER FOR LIPID SCREENING FOR CARDIOVASCULAR DISEASE: ICD-10-CM

## 2022-07-27 LAB
ALBUMIN SERPL-MCNC: 4.5 G/DL (ref 3.5–5.2)
ALBUMIN/GLOB SERPL: 1.6 G/DL
ALP SERPL-CCNC: 79 U/L (ref 39–117)
ALT SERPL W P-5'-P-CCNC: 12 U/L (ref 1–41)
ANION GAP SERPL CALCULATED.3IONS-SCNC: 10.3 MMOL/L (ref 5–15)
AST SERPL-CCNC: 13 U/L (ref 1–40)
BASOPHILS # BLD AUTO: 0.12 10*3/MM3 (ref 0–0.2)
BASOPHILS NFR BLD AUTO: 1.2 % (ref 0–1.5)
BILIRUB BLD-MCNC: NEGATIVE MG/DL
BILIRUB SERPL-MCNC: 0.4 MG/DL (ref 0–1.2)
BUN SERPL-MCNC: 19 MG/DL (ref 8–23)
BUN/CREAT SERPL: 14.4 (ref 7–25)
CALCIUM SPEC-SCNC: 9.8 MG/DL (ref 8.6–10.5)
CHLORIDE SERPL-SCNC: 97 MMOL/L (ref 98–107)
CHOLEST SERPL-MCNC: 153 MG/DL (ref 0–200)
CLARITY, POC: CLEAR
CO2 SERPL-SCNC: 32.7 MMOL/L (ref 22–29)
COLOR UR: YELLOW
CREAT SERPL-MCNC: 1.32 MG/DL (ref 0.76–1.27)
DEPRECATED RDW RBC AUTO: 46 FL (ref 37–54)
EGFRCR SERPLBLD CKD-EPI 2021: 54.9 ML/MIN/1.73
EOSINOPHIL # BLD AUTO: 0.49 10*3/MM3 (ref 0–0.4)
EOSINOPHIL NFR BLD AUTO: 5 % (ref 0.3–6.2)
ERYTHROCYTE [DISTWIDTH] IN BLOOD BY AUTOMATED COUNT: 13.5 % (ref 12.3–15.4)
EXPIRATION DATE: ABNORMAL
GLOBULIN UR ELPH-MCNC: 2.9 GM/DL
GLUCOSE SERPL-MCNC: 119 MG/DL (ref 65–99)
GLUCOSE UR STRIP-MCNC: NEGATIVE MG/DL
HCT VFR BLD AUTO: 44.3 % (ref 37.5–51)
HCV AB SER DONR QL: NORMAL
HDLC SERPL-MCNC: 34 MG/DL (ref 40–60)
HGB BLD-MCNC: 15 G/DL (ref 13–17.7)
IMM GRANULOCYTES # BLD AUTO: 0.05 10*3/MM3 (ref 0–0.05)
IMM GRANULOCYTES NFR BLD AUTO: 0.5 % (ref 0–0.5)
KETONES UR QL: NEGATIVE
LDLC SERPL CALC-MCNC: 99 MG/DL (ref 0–100)
LDLC/HDLC SERPL: 2.85 {RATIO}
LEUKOCYTE EST, POC: NEGATIVE
LYMPHOCYTES # BLD AUTO: 2.64 10*3/MM3 (ref 0.7–3.1)
LYMPHOCYTES NFR BLD AUTO: 26.7 % (ref 19.6–45.3)
Lab: ABNORMAL
MCH RBC QN AUTO: 32.3 PG (ref 26.6–33)
MCHC RBC AUTO-ENTMCNC: 33.9 G/DL (ref 31.5–35.7)
MCV RBC AUTO: 95.3 FL (ref 79–97)
MONOCYTES # BLD AUTO: 0.6 10*3/MM3 (ref 0.1–0.9)
MONOCYTES NFR BLD AUTO: 6.1 % (ref 5–12)
NEUTROPHILS NFR BLD AUTO: 5.97 10*3/MM3 (ref 1.7–7)
NEUTROPHILS NFR BLD AUTO: 60.5 % (ref 42.7–76)
NITRITE UR-MCNC: NEGATIVE MG/ML
NRBC BLD AUTO-RTO: 0 /100 WBC (ref 0–0.2)
PH UR: 5.5 [PH] (ref 5–8)
PLATELET # BLD AUTO: 221 10*3/MM3 (ref 140–450)
PMV BLD AUTO: 10.3 FL (ref 6–12)
POTASSIUM SERPL-SCNC: 4.2 MMOL/L (ref 3.5–5.2)
PROT SERPL-MCNC: 7.4 G/DL (ref 6–8.5)
PROT UR STRIP-MCNC: ABNORMAL MG/DL
RBC # BLD AUTO: 4.65 10*6/MM3 (ref 4.14–5.8)
RBC # UR STRIP: NEGATIVE /UL
SODIUM SERPL-SCNC: 140 MMOL/L (ref 136–145)
SP GR UR: 1.02 (ref 1–1.03)
TRIGL SERPL-MCNC: 111 MG/DL (ref 0–150)
TSH SERPL DL<=0.05 MIU/L-ACNC: 1.77 UIU/ML (ref 0.27–4.2)
URATE SERPL-MCNC: 4.9 MG/DL (ref 3.4–7)
UROBILINOGEN UR QL: NORMAL
VLDLC SERPL-MCNC: 20 MG/DL (ref 5–40)
WBC NRBC COR # BLD: 9.87 10*3/MM3 (ref 3.4–10.8)

## 2022-07-27 PROCEDURE — 81003 URINALYSIS AUTO W/O SCOPE: CPT | Performed by: FAMILY MEDICINE

## 2022-07-27 PROCEDURE — 80061 LIPID PANEL: CPT | Performed by: FAMILY MEDICINE

## 2022-07-27 PROCEDURE — 84550 ASSAY OF BLOOD/URIC ACID: CPT | Performed by: FAMILY MEDICINE

## 2022-07-27 PROCEDURE — 80050 GENERAL HEALTH PANEL: CPT | Performed by: FAMILY MEDICINE

## 2022-07-27 PROCEDURE — 86803 HEPATITIS C AB TEST: CPT | Performed by: FAMILY MEDICINE

## 2022-07-27 PROCEDURE — 99214 OFFICE O/P EST MOD 30 MIN: CPT | Performed by: FAMILY MEDICINE

## 2022-07-27 RX ORDER — TAMSULOSIN HYDROCHLORIDE 0.4 MG/1
1 CAPSULE ORAL DAILY
Qty: 90 CAPSULE | Refills: 3 | Status: SHIPPED | OUTPATIENT
Start: 2022-07-27

## 2022-07-27 RX ORDER — ALLOPURINOL 300 MG/1
300 TABLET ORAL DAILY
Qty: 90 TABLET | Refills: 3 | Status: SHIPPED | OUTPATIENT
Start: 2022-07-27

## 2022-07-27 RX ORDER — HYDROCHLOROTHIAZIDE 25 MG/1
25 TABLET ORAL DAILY
Qty: 90 TABLET | Refills: 3 | Status: SHIPPED | OUTPATIENT
Start: 2022-07-27

## 2022-07-27 NOTE — PROGRESS NOTES
"Chief Complaint  No chief complaint on file.    MARÍA Layton presents to [facility]    Patient 81 years old has a moderate dementia Alzheimer's type has hypertension continues to smoke no history of gout    PAST MEDICAL HISTORY  No Known Allergies     No past surgical history on file.    Social History     Tobacco Use   • Smoking status: Current Every Day Smoker     Packs/day: 1.00     Years: 50.00     Pack years: 50.00     Start date: 1962   • Smokeless tobacco: Never Used   • Tobacco comment: pt declines LDCT scan, states feels fine does not want   Substance Use Topics   • Alcohol use: Not Currently     Alcohol/week: 7.0 standard drinks     Types: 7 Shots of liquor per week     Comment: Current some day, wife states that he is not drinking she puts water in the vodka container but he is not aware of this.       Family History   Problem Relation Age of Onset   • Heart disease Father    • Brain cancer Brother         Health Maintenance Due   Topic Date Due   • Pneumococcal Vaccine 65+ (1 - PCV) Never done   • TDAP/TD VACCINES (1 - Tdap) Never done   • ZOSTER VACCINE (1 of 2) Never done   • LUNG CANCER SCREENING  Never done   • HEPATITIS C SCREENING  Never done   • COVID-19 Vaccine (4 - Booster for Pfizer series) 04/04/2022   • LIPID PANEL  07/22/2022        Last Completed Colonoscopy     This patient has no relevant Health Maintenance data.          REVIEW OF SYSTEMS    Cardiovascular no chest pain no palpitations  Respiratory no shortness of breath or dyspnea exertion  Neurologic memory is getting worse by talking to his family he states there is no problem daughter is running the business he is not driving discussed the Alzheimer's at length with daughter and wife OBJECTIVE  Vitals:    07/27/22 1623   BP: 138/58   Pulse: 79   Temp: 97.8 °F (36.6 °C)   SpO2: 97%   Weight: 67.6 kg (149 lb)   Height: 170.2 cm (67\")     Body mass index is 23.34 kg/m².    PHYSICAL " EXAM    General no distress  Cardiovascular regular rhythm no murmur  Respiratory lungs clear and equal bilaterally  Abdomen soft nontender no hepatosplenomegaly  Skin no cancerous or precancerous lesions  Neurologic speech is normal gait is normal patient can answer simple questions    ASSESSMENT & PLAN  Diagnoses and all orders for this visit:    1. Annual physical exam (Primary)  -     TSH  -     Comprehensive Metabolic Panel  -     Lipid Panel  -     CBC & Differential  -     POC Urinalysis Dipstick, Automated  -     Uric Acid    2. Encounter for lipid screening for cardiovascular disease  -     Lipid Panel    3. Laboratory exam ordered as part of routine general medical examination  -     TSH  -     Comprehensive Metabolic Panel  -     Lipid Panel  -     CBC & Differential  -     POC Urinalysis Dipstick, Automated  -     Uric Acid  -     Hepatitis C Antibody    4. Encounter for hepatitis C screening test for low risk patient  -     Hepatitis C Antibody    5. Medication management  -     TSH  -     Comprehensive Metabolic Panel  -     Lipid Panel  -     CBC & Differential  -     POC Urinalysis Dipstick, Automated  -     Uric Acid    6. Chronic gout with tophus, unspecified cause, unspecified site  -     TSH  -     Comprehensive Metabolic Panel  -     Lipid Panel  -     CBC & Differential  -     POC Urinalysis Dipstick, Automated  -     Uric Acid    7. Essential hypertension  -     TSH  -     Comprehensive Metabolic Panel  -     Lipid Panel  -     CBC & Differential  -     POC Urinalysis Dipstick, Automated    8. High cholesterol  -     TSH  -     Comprehensive Metabolic Panel  -     Lipid Panel  -     CBC & Differential  -     POC Urinalysis Dipstick, Automated    9. Nicotine dependence, cigarettes, with other nicotine-induced disorders  -     TSH  -     Comprehensive Metabolic Panel  -     Lipid Panel  -     CBC & Differential  -     POC Urinalysis Dipstick, Automated  -     CT chest low dose wo; Future    10.  Benign prostatic hyperplasia, unspecified whether lower urinary tract symptoms present  -     tamsulosin (FLOMAX) 0.4 MG capsule 24 hr capsule; Take 1 capsule by mouth Daily.  Dispense: 90 capsule; Refill: 3    11. Encounter for screening for lung cancer  -     CT chest low dose wo; Future    12. Nicotine dependence, cigarettes, uncomplicated  -     CT chest low dose wo; Future    13. Colonoscopy refused    Other orders  -     allopurinol (ZYLOPRIM) 300 MG tablet; Take 1 tablet by mouth Daily.  Dispense: 90 tablet; Refill: 3  -     hydroCHLOROthiazide (HYDRODIURIL) 25 MG tablet; Take 1 tablet by mouth Daily.  Dispense: 90 tablet; Refill: 3          Hypertension controlled dementia Alzheimer's type moderate towards severe gout well-controlled patient is a DO NOT RESUSCITATE family understands            Patient was given instructions and counseling regarding his condition or for health maintenance advice. Please see specific information pulled into the AVS if appropriate.

## 2022-08-16 ENCOUNTER — HOSPITAL ENCOUNTER (OUTPATIENT)
Dept: CT IMAGING | Facility: HOSPITAL | Age: 82
Discharge: HOME | End: 2022-08-16
Admitting: FAMILY MEDICINE

## 2022-08-16 DIAGNOSIS — Z12.2 ENCOUNTER FOR SCREENING FOR LUNG CANCER: ICD-10-CM

## 2022-08-16 DIAGNOSIS — F17.218 NICOTINE DEPENDENCE, CIGARETTES, WITH OTHER NICOTINE-INDUCED DISORDERS: ICD-10-CM

## 2022-08-16 DIAGNOSIS — F17.210 NICOTINE DEPENDENCE, CIGARETTES, UNCOMPLICATED: ICD-10-CM

## 2022-08-16 PROCEDURE — 71271 CT THORAX LUNG CANCER SCR C-: CPT

## 2023-07-24 RX ORDER — AMLODIPINE BESYLATE 10 MG/1
TABLET ORAL
Qty: 90 TABLET | Refills: 0 | Status: SHIPPED | OUTPATIENT
Start: 2023-07-24 | End: 2023-07-31 | Stop reason: SDUPTHER

## 2023-07-31 ENCOUNTER — OFFICE VISIT (OUTPATIENT)
Dept: FAMILY MEDICINE CLINIC | Facility: CLINIC | Age: 83
End: 2023-07-31
Payer: MEDICARE

## 2023-07-31 VITALS
WEIGHT: 148 LBS | HEART RATE: 73 BPM | BODY MASS INDEX: 23.23 KG/M2 | OXYGEN SATURATION: 94 % | HEIGHT: 67 IN | TEMPERATURE: 97.7 F | DIASTOLIC BLOOD PRESSURE: 60 MMHG | SYSTOLIC BLOOD PRESSURE: 136 MMHG

## 2023-07-31 DIAGNOSIS — Z79.899 MEDICATION MANAGEMENT: Primary | ICD-10-CM

## 2023-07-31 DIAGNOSIS — N40.0 BENIGN PROSTATIC HYPERPLASIA, UNSPECIFIED WHETHER LOWER URINARY TRACT SYMPTOMS PRESENT: ICD-10-CM

## 2023-07-31 DIAGNOSIS — G31.84 MILD COGNITIVE IMPAIRMENT WITH MEMORY LOSS: ICD-10-CM

## 2023-07-31 DIAGNOSIS — H61.21 IMPACTED CERUMEN OF RIGHT EAR: ICD-10-CM

## 2023-07-31 DIAGNOSIS — E78.00 HIGH CHOLESTEROL: ICD-10-CM

## 2023-07-31 DIAGNOSIS — I10 ESSENTIAL HYPERTENSION: ICD-10-CM

## 2023-07-31 DIAGNOSIS — Z00.00 ROUTINE ADULT HEALTH MAINTENANCE: ICD-10-CM

## 2023-07-31 DIAGNOSIS — M1A.9XX0 CHRONIC GOUT WITHOUT TOPHUS, UNSPECIFIED CAUSE, UNSPECIFIED SITE: ICD-10-CM

## 2023-07-31 DIAGNOSIS — L60.3 DYSTROPHIC NAIL: ICD-10-CM

## 2023-07-31 DIAGNOSIS — F17.210 NICOTINE DEPENDENCE, CIGARETTES, UNCOMPLICATED: ICD-10-CM

## 2023-07-31 LAB
ALBUMIN SERPL-MCNC: 4.4 G/DL (ref 3.5–5.2)
ALBUMIN/GLOB SERPL: 1.2 G/DL
ALP SERPL-CCNC: 79 U/L (ref 39–117)
ALT SERPL W P-5'-P-CCNC: 6 U/L (ref 1–41)
ANION GAP SERPL CALCULATED.3IONS-SCNC: 12 MMOL/L (ref 5–15)
AST SERPL-CCNC: 12 U/L (ref 1–40)
BASOPHILS # BLD AUTO: 0.12 10*3/MM3 (ref 0–0.2)
BASOPHILS NFR BLD AUTO: 1.2 % (ref 0–1.5)
BILIRUB SERPL-MCNC: 0.4 MG/DL (ref 0–1.2)
BUN SERPL-MCNC: 22 MG/DL (ref 8–23)
BUN/CREAT SERPL: 15.2 (ref 7–25)
CALCIUM SPEC-SCNC: 10.2 MG/DL (ref 8.6–10.5)
CHLORIDE SERPL-SCNC: 94 MMOL/L (ref 98–107)
CHOLEST SERPL-MCNC: 163 MG/DL (ref 0–200)
CO2 SERPL-SCNC: 31 MMOL/L (ref 22–29)
CREAT SERPL-MCNC: 1.45 MG/DL (ref 0.76–1.27)
DEPRECATED RDW RBC AUTO: 45.2 FL (ref 37–54)
EGFRCR SERPLBLD CKD-EPI 2021: 48.7 ML/MIN/1.73
EOSINOPHIL # BLD AUTO: 0.32 10*3/MM3 (ref 0–0.4)
EOSINOPHIL NFR BLD AUTO: 3.2 % (ref 0.3–6.2)
ERYTHROCYTE [DISTWIDTH] IN BLOOD BY AUTOMATED COUNT: 13.5 % (ref 12.3–15.4)
GLOBULIN UR ELPH-MCNC: 3.6 GM/DL
GLUCOSE SERPL-MCNC: 133 MG/DL (ref 65–99)
HCT VFR BLD AUTO: 46 % (ref 37.5–51)
HDLC SERPL-MCNC: 33 MG/DL (ref 40–60)
HGB BLD-MCNC: 15.2 G/DL (ref 13–17.7)
IMM GRANULOCYTES # BLD AUTO: 0.05 10*3/MM3 (ref 0–0.05)
IMM GRANULOCYTES NFR BLD AUTO: 0.5 % (ref 0–0.5)
LDLC SERPL CALC-MCNC: 107 MG/DL (ref 0–100)
LDLC/HDLC SERPL: 3.18 {RATIO}
LYMPHOCYTES # BLD AUTO: 2.32 10*3/MM3 (ref 0.7–3.1)
LYMPHOCYTES NFR BLD AUTO: 23.6 % (ref 19.6–45.3)
MCH RBC QN AUTO: 30.6 PG (ref 26.6–33)
MCHC RBC AUTO-ENTMCNC: 33 G/DL (ref 31.5–35.7)
MCV RBC AUTO: 92.7 FL (ref 79–97)
MONOCYTES # BLD AUTO: 0.54 10*3/MM3 (ref 0.1–0.9)
MONOCYTES NFR BLD AUTO: 5.5 % (ref 5–12)
NEUTROPHILS NFR BLD AUTO: 6.5 10*3/MM3 (ref 1.7–7)
NEUTROPHILS NFR BLD AUTO: 66 % (ref 42.7–76)
NRBC BLD AUTO-RTO: 0.1 /100 WBC (ref 0–0.2)
PLATELET # BLD AUTO: 254 10*3/MM3 (ref 140–450)
PMV BLD AUTO: 10.9 FL (ref 6–12)
POTASSIUM SERPL-SCNC: 4.2 MMOL/L (ref 3.5–5.2)
PROT SERPL-MCNC: 8 G/DL (ref 6–8.5)
RBC # BLD AUTO: 4.96 10*6/MM3 (ref 4.14–5.8)
SODIUM SERPL-SCNC: 137 MMOL/L (ref 136–145)
TRIGL SERPL-MCNC: 126 MG/DL (ref 0–150)
TSH SERPL DL<=0.05 MIU/L-ACNC: 1.74 UIU/ML (ref 0.27–4.2)
URATE SERPL-MCNC: 5.1 MG/DL (ref 3.4–7)
VLDLC SERPL-MCNC: 23 MG/DL (ref 5–40)
WBC NRBC COR # BLD: 9.85 10*3/MM3 (ref 3.4–10.8)

## 2023-07-31 PROCEDURE — 84443 ASSAY THYROID STIM HORMONE: CPT | Performed by: FAMILY MEDICINE

## 2023-07-31 PROCEDURE — 36415 COLL VENOUS BLD VENIPUNCTURE: CPT | Performed by: FAMILY MEDICINE

## 2023-07-31 PROCEDURE — 3078F DIAST BP <80 MM HG: CPT | Performed by: FAMILY MEDICINE

## 2023-07-31 PROCEDURE — 69210 REMOVE IMPACTED EAR WAX UNI: CPT | Performed by: FAMILY MEDICINE

## 2023-07-31 PROCEDURE — 80061 LIPID PANEL: CPT | Performed by: FAMILY MEDICINE

## 2023-07-31 PROCEDURE — 84550 ASSAY OF BLOOD/URIC ACID: CPT | Performed by: FAMILY MEDICINE

## 2023-07-31 PROCEDURE — G0127 TRIM NAIL(S): HCPCS | Performed by: FAMILY MEDICINE

## 2023-07-31 PROCEDURE — 80053 COMPREHEN METABOLIC PANEL: CPT | Performed by: FAMILY MEDICINE

## 2023-07-31 PROCEDURE — 3075F SYST BP GE 130 - 139MM HG: CPT | Performed by: FAMILY MEDICINE

## 2023-07-31 PROCEDURE — 99214 OFFICE O/P EST MOD 30 MIN: CPT | Performed by: FAMILY MEDICINE

## 2023-07-31 PROCEDURE — 85025 COMPLETE CBC W/AUTO DIFF WBC: CPT | Performed by: FAMILY MEDICINE

## 2023-07-31 RX ORDER — ALLOPURINOL 300 MG/1
300 TABLET ORAL DAILY
Qty: 90 TABLET | Refills: 3 | Status: SHIPPED | OUTPATIENT
Start: 2023-07-31

## 2023-07-31 RX ORDER — HYDROCHLOROTHIAZIDE 25 MG/1
25 TABLET ORAL DAILY
Qty: 90 TABLET | Refills: 3 | Status: SHIPPED | OUTPATIENT
Start: 2023-07-31

## 2023-07-31 RX ORDER — AMLODIPINE BESYLATE 10 MG/1
10 TABLET ORAL DAILY
Qty: 90 TABLET | Refills: 3 | Status: SHIPPED | OUTPATIENT
Start: 2023-07-31

## 2023-07-31 RX ORDER — TAMSULOSIN HYDROCHLORIDE 0.4 MG/1
1 CAPSULE ORAL DAILY
Qty: 90 CAPSULE | Refills: 3 | Status: SHIPPED | OUTPATIENT
Start: 2023-07-31

## 2024-01-01 ENCOUNTER — APPOINTMENT (OUTPATIENT)
Dept: GENERAL RADIOLOGY | Facility: HOSPITAL | Age: 84
End: 2024-01-01
Payer: MEDICARE

## 2024-01-01 ENCOUNTER — HOSPITAL ENCOUNTER (OUTPATIENT)
Facility: HOSPITAL | Age: 84
Setting detail: OBSERVATION
End: 2024-05-01
Attending: EMERGENCY MEDICINE | Admitting: FAMILY MEDICINE
Payer: MEDICARE

## 2024-01-01 VITALS
OXYGEN SATURATION: 76 % | BODY MASS INDEX: 21.63 KG/M2 | HEIGHT: 67 IN | DIASTOLIC BLOOD PRESSURE: 44 MMHG | RESPIRATION RATE: 16 BRPM | TEMPERATURE: 98.2 F | SYSTOLIC BLOOD PRESSURE: 93 MMHG | HEART RATE: 109 BPM | WEIGHT: 137.79 LBS

## 2024-01-01 DIAGNOSIS — K92.2 LOWER GI BLEED: Primary | ICD-10-CM

## 2024-01-01 LAB
ABO GROUP BLD: NORMAL
ABO GROUP BLD: NORMAL
ALBUMIN SERPL-MCNC: 3 G/DL (ref 3.5–5.2)
ALBUMIN/GLOB SERPL: 0.8 G/DL
ALP SERPL-CCNC: 82 U/L (ref 39–117)
ALT SERPL W P-5'-P-CCNC: 10 U/L (ref 1–41)
ANION GAP SERPL CALCULATED.3IONS-SCNC: 9.4 MMOL/L (ref 5–15)
ANISOCYTOSIS BLD QL: NORMAL
AST SERPL-CCNC: 14 U/L (ref 1–40)
BACTERIA UR QL AUTO: ABNORMAL /HPF
BASOPHILS # BLD AUTO: 0.08 10*3/MM3 (ref 0–0.2)
BASOPHILS NFR BLD AUTO: 0.5 % (ref 0–1.5)
BILIRUB SERPL-MCNC: 0.3 MG/DL (ref 0–1.2)
BILIRUB UR QL STRIP: NEGATIVE
BLD GP AB SCN SERPL QL: NEGATIVE
BUN SERPL-MCNC: 28 MG/DL (ref 8–23)
BUN/CREAT SERPL: 18.4 (ref 7–25)
BURR CELLS BLD QL SMEAR: NORMAL
CALCIUM SPEC-SCNC: 8.8 MG/DL (ref 8.6–10.5)
CHLORIDE SERPL-SCNC: 103 MMOL/L (ref 98–107)
CLARITY UR: CLEAR
CO2 SERPL-SCNC: 26.6 MMOL/L (ref 22–29)
COLOR UR: ABNORMAL
CREAT SERPL-MCNC: 1.52 MG/DL (ref 0.76–1.27)
D-LACTATE SERPL-SCNC: 1.5 MMOL/L (ref 0.5–2)
DEPRECATED RDW RBC AUTO: 57.9 FL (ref 37–54)
EGFRCR SERPLBLD CKD-EPI 2021: 45.7 ML/MIN/1.73
EOSINOPHIL # BLD AUTO: 0.03 10*3/MM3 (ref 0–0.4)
EOSINOPHIL NFR BLD AUTO: 0.2 % (ref 0.3–6.2)
ERYTHROCYTE [DISTWIDTH] IN BLOOD BY AUTOMATED COUNT: 14.8 % (ref 12.3–15.4)
GLOBULIN UR ELPH-MCNC: 3.7 GM/DL
GLUCOSE SERPL-MCNC: 150 MG/DL (ref 65–99)
GLUCOSE UR STRIP-MCNC: NEGATIVE MG/DL
HCT VFR BLD AUTO: 32.2 % (ref 37.5–51)
HGB BLD-MCNC: 9.6 G/DL (ref 13–17.7)
HGB UR QL STRIP.AUTO: ABNORMAL
HOLD SPECIMEN: NORMAL
HOLD SPECIMEN: NORMAL
HYALINE CASTS UR QL AUTO: ABNORMAL /LPF
IMM GRANULOCYTES # BLD AUTO: 0.14 10*3/MM3 (ref 0–0.05)
IMM GRANULOCYTES NFR BLD AUTO: 0.9 % (ref 0–0.5)
INR PPP: 1.32 (ref 0.86–1.15)
KETONES UR QL STRIP: NEGATIVE
LARGE PLATELETS: NORMAL
LEUKOCYTE ESTERASE UR QL STRIP.AUTO: ABNORMAL
LYMPHOCYTES # BLD AUTO: 1.4 10*3/MM3 (ref 0.7–3.1)
LYMPHOCYTES NFR BLD AUTO: 8.9 % (ref 19.6–45.3)
MACROCYTES BLD QL SMEAR: NORMAL
MCH RBC QN AUTO: 31.8 PG (ref 26.6–33)
MCHC RBC AUTO-ENTMCNC: 29.8 G/DL (ref 31.5–35.7)
MCV RBC AUTO: 106.6 FL (ref 79–97)
MONOCYTES # BLD AUTO: 0.89 10*3/MM3 (ref 0.1–0.9)
MONOCYTES NFR BLD AUTO: 5.6 % (ref 5–12)
NEUTROPHILS NFR BLD AUTO: 13.24 10*3/MM3 (ref 1.7–7)
NEUTROPHILS NFR BLD AUTO: 83.9 % (ref 42.7–76)
NITRITE UR QL STRIP: NEGATIVE
NRBC BLD AUTO-RTO: 0.1 /100 WBC (ref 0–0.2)
NT-PROBNP SERPL-MCNC: 774.5 PG/ML (ref 0–1800)
PH UR STRIP.AUTO: 5.5 [PH] (ref 5–8)
PLATELET # BLD AUTO: 187 10*3/MM3 (ref 140–450)
PMV BLD AUTO: 11.8 FL (ref 6–12)
POTASSIUM SERPL-SCNC: 4.1 MMOL/L (ref 3.5–5.2)
PROT SERPL-MCNC: 6.7 G/DL (ref 6–8.5)
PROT UR QL STRIP: ABNORMAL
PROTHROMBIN TIME: 16.6 SECONDS (ref 11.8–14.9)
RBC # BLD AUTO: 3.02 10*6/MM3 (ref 4.14–5.8)
RBC # UR STRIP: ABNORMAL /HPF
REF LAB TEST METHOD: ABNORMAL
RH BLD: POSITIVE
RH BLD: POSITIVE
SMALL PLATELETS BLD QL SMEAR: ADEQUATE
SODIUM SERPL-SCNC: 139 MMOL/L (ref 136–145)
SP GR UR STRIP: 1.02 (ref 1–1.03)
SQUAMOUS #/AREA URNS HPF: ABNORMAL /HPF
T&S EXPIRATION DATE: NORMAL
UROBILINOGEN UR QL STRIP: ABNORMAL
WBC # UR STRIP: ABNORMAL /HPF
WBC MORPH BLD: NORMAL
WBC NRBC COR # BLD AUTO: 15.78 10*3/MM3 (ref 3.4–10.8)
WHOLE BLOOD HOLD COAG: NORMAL
WHOLE BLOOD HOLD SPECIMEN: NORMAL

## 2024-01-01 PROCEDURE — 96374 THER/PROPH/DIAG INJ IV PUSH: CPT

## 2024-01-01 PROCEDURE — 94640 AIRWAY INHALATION TREATMENT: CPT

## 2024-01-01 PROCEDURE — 99222 1ST HOSP IP/OBS MODERATE 55: CPT | Performed by: FAMILY MEDICINE

## 2024-01-01 PROCEDURE — G0378 HOSPITAL OBSERVATION PER HR: HCPCS

## 2024-01-01 PROCEDURE — 81001 URINALYSIS AUTO W/SCOPE: CPT | Performed by: INTERNAL MEDICINE

## 2024-01-01 PROCEDURE — 94664 DEMO&/EVAL PT USE INHALER: CPT

## 2024-01-01 PROCEDURE — 94799 UNLISTED PULMONARY SVC/PX: CPT

## 2024-01-01 PROCEDURE — 85025 COMPLETE CBC W/AUTO DIFF WBC: CPT | Performed by: EMERGENCY MEDICINE

## 2024-01-01 PROCEDURE — 87040 BLOOD CULTURE FOR BACTERIA: CPT | Performed by: INTERNAL MEDICINE

## 2024-01-01 PROCEDURE — 86900 BLOOD TYPING SEROLOGIC ABO: CPT

## 2024-01-01 PROCEDURE — 99285 EMERGENCY DEPT VISIT HI MDM: CPT

## 2024-01-01 PROCEDURE — 25010000002 MORPHINE PER 10 MG: Performed by: INTERNAL MEDICINE

## 2024-01-01 PROCEDURE — 83605 ASSAY OF LACTIC ACID: CPT | Performed by: EMERGENCY MEDICINE

## 2024-01-01 PROCEDURE — 25810000003 LACTATED RINGERS PER 1000 ML: Performed by: FAMILY MEDICINE

## 2024-01-01 PROCEDURE — 96361 HYDRATE IV INFUSION ADD-ON: CPT

## 2024-01-01 PROCEDURE — 85610 PROTHROMBIN TIME: CPT | Performed by: FAMILY MEDICINE

## 2024-01-01 PROCEDURE — 51702 INSERT TEMP BLADDER CATH: CPT

## 2024-01-01 PROCEDURE — 86850 RBC ANTIBODY SCREEN: CPT | Performed by: EMERGENCY MEDICINE

## 2024-01-01 PROCEDURE — 25010000002 LORAZEPAM PER 2 MG: Performed by: INTERNAL MEDICINE

## 2024-01-01 PROCEDURE — 36415 COLL VENOUS BLD VENIPUNCTURE: CPT

## 2024-01-01 PROCEDURE — 83880 ASSAY OF NATRIURETIC PEPTIDE: CPT | Performed by: INTERNAL MEDICINE

## 2024-01-01 PROCEDURE — 96375 TX/PRO/DX INJ NEW DRUG ADDON: CPT

## 2024-01-01 PROCEDURE — 85007 BL SMEAR W/DIFF WBC COUNT: CPT | Performed by: EMERGENCY MEDICINE

## 2024-01-01 PROCEDURE — 96376 TX/PRO/DX INJ SAME DRUG ADON: CPT

## 2024-01-01 PROCEDURE — 99232 SBSQ HOSP IP/OBS MODERATE 35: CPT | Performed by: INTERNAL MEDICINE

## 2024-01-01 PROCEDURE — 86901 BLOOD TYPING SEROLOGIC RH(D): CPT | Performed by: EMERGENCY MEDICINE

## 2024-01-01 PROCEDURE — 99291 CRITICAL CARE FIRST HOUR: CPT | Performed by: INTERNAL MEDICINE

## 2024-01-01 PROCEDURE — 86900 BLOOD TYPING SEROLOGIC ABO: CPT | Performed by: EMERGENCY MEDICINE

## 2024-01-01 PROCEDURE — 86901 BLOOD TYPING SEROLOGIC RH(D): CPT

## 2024-01-01 PROCEDURE — 80053 COMPREHEN METABOLIC PANEL: CPT | Performed by: EMERGENCY MEDICINE

## 2024-01-01 RX ORDER — TAMSULOSIN HYDROCHLORIDE 0.4 MG/1
0.4 CAPSULE ORAL DAILY
Status: DISCONTINUED | OUTPATIENT
Start: 2024-01-01 | End: 2024-01-01

## 2024-01-01 RX ORDER — ATROPINE SULFATE 10 MG/ML
2 SOLUTION/ DROPS OPHTHALMIC
Status: DISCONTINUED | OUTPATIENT
Start: 2024-01-01 | End: 2024-01-01 | Stop reason: HOSPADM

## 2024-01-01 RX ORDER — MEMANTINE HYDROCHLORIDE 5 MG/1
10 TABLET ORAL 2 TIMES DAILY
COMMUNITY

## 2024-01-01 RX ORDER — AMOXICILLIN 250 MG
2 CAPSULE ORAL DAILY
COMMUNITY

## 2024-01-01 RX ORDER — MORPHINE SULFATE 10 MG/.5ML
10 SOLUTION ORAL 4 TIMES DAILY
Status: DISCONTINUED | OUTPATIENT
Start: 2024-01-01 | End: 2024-01-01 | Stop reason: HOSPADM

## 2024-01-01 RX ORDER — ALLOPURINOL 300 MG/1
300 TABLET ORAL DAILY
Status: DISCONTINUED | OUTPATIENT
Start: 2024-01-01 | End: 2024-01-01

## 2024-01-01 RX ORDER — BISACODYL 10 MG
10 SUPPOSITORY, RECTAL RECTAL DAILY PRN
Status: DISCONTINUED | OUTPATIENT
Start: 2024-01-01 | End: 2024-01-01 | Stop reason: HOSPADM

## 2024-01-01 RX ORDER — LORAZEPAM 2 MG/ML
0.5 INJECTION INTRAMUSCULAR
Status: DISCONTINUED | OUTPATIENT
Start: 2024-01-01 | End: 2024-01-01 | Stop reason: HOSPADM

## 2024-01-01 RX ORDER — BISACODYL 5 MG/1
5 TABLET, DELAYED RELEASE ORAL DAILY PRN
Status: DISCONTINUED | OUTPATIENT
Start: 2024-01-01 | End: 2024-01-01 | Stop reason: HOSPADM

## 2024-01-01 RX ORDER — LORAZEPAM 2 MG/ML
0.5 CONCENTRATE ORAL
Status: DISCONTINUED | OUTPATIENT
Start: 2024-01-01 | End: 2024-01-01 | Stop reason: HOSPADM

## 2024-01-01 RX ORDER — MORPHINE SULFATE 2 MG/ML
1 INJECTION, SOLUTION INTRAMUSCULAR; INTRAVENOUS EVERY 4 HOURS PRN
Status: DISCONTINUED | OUTPATIENT
Start: 2024-01-01 | End: 2024-01-01

## 2024-01-01 RX ORDER — BUDESONIDE 0.5 MG/2ML
0.5 INHALANT ORAL
Status: DISCONTINUED | OUTPATIENT
Start: 2024-01-01 | End: 2024-01-01

## 2024-01-01 RX ORDER — ALUMINA, MAGNESIA, AND SIMETHICONE 2400; 2400; 240 MG/30ML; MG/30ML; MG/30ML
15 SUSPENSION ORAL EVERY 6 HOURS PRN
Status: DISCONTINUED | OUTPATIENT
Start: 2024-01-01 | End: 2024-01-01 | Stop reason: HOSPADM

## 2024-01-01 RX ORDER — POLYETHYLENE GLYCOL 3350 17 G/17G
17 POWDER, FOR SOLUTION ORAL DAILY PRN
Status: DISCONTINUED | OUTPATIENT
Start: 2024-01-01 | End: 2024-01-01

## 2024-01-01 RX ORDER — HYDROXYZINE HYDROCHLORIDE 25 MG/1
25 TABLET, FILM COATED ORAL 3 TIMES DAILY PRN
Status: DISCONTINUED | OUTPATIENT
Start: 2024-01-01 | End: 2024-01-01 | Stop reason: HOSPADM

## 2024-01-01 RX ORDER — LORAZEPAM 2 MG/ML
1 CONCENTRATE ORAL 4 TIMES DAILY
Status: DISCONTINUED | OUTPATIENT
Start: 2024-01-01 | End: 2024-01-01 | Stop reason: HOSPADM

## 2024-01-01 RX ORDER — CHOLECALCIFEROL (VITAMIN D3) 125 MCG
5 CAPSULE ORAL NIGHTLY PRN
Status: DISCONTINUED | OUTPATIENT
Start: 2024-01-01 | End: 2024-01-01 | Stop reason: HOSPADM

## 2024-01-01 RX ORDER — LORAZEPAM 0.5 MG/1
0.5 TABLET ORAL 2 TIMES DAILY
Status: DISCONTINUED | OUTPATIENT
Start: 2024-01-01 | End: 2024-01-01

## 2024-01-01 RX ORDER — NALOXONE HCL 0.4 MG/ML
0.4 VIAL (ML) INJECTION
Status: DISCONTINUED | OUTPATIENT
Start: 2024-01-01 | End: 2024-01-01

## 2024-01-01 RX ORDER — CEFTRIAXONE 1 G/1
1 INJECTION, POWDER, FOR SOLUTION INTRAMUSCULAR; INTRAVENOUS DAILY
Status: DISCONTINUED | OUTPATIENT
Start: 2024-01-01 | End: 2024-01-01

## 2024-01-01 RX ORDER — HYDROCORTISONE ACETATE 25 MG/1
25 SUPPOSITORY RECTAL 2 TIMES DAILY
Status: DISCONTINUED | OUTPATIENT
Start: 2024-01-01 | End: 2024-01-01

## 2024-01-01 RX ORDER — MEMANTINE HYDROCHLORIDE 10 MG/1
10 TABLET ORAL 2 TIMES DAILY
Status: DISCONTINUED | OUTPATIENT
Start: 2024-01-01 | End: 2024-01-01

## 2024-01-01 RX ORDER — SODIUM CHLORIDE 0.9 % (FLUSH) 0.9 %
10 SYRINGE (ML) INJECTION AS NEEDED
Status: DISCONTINUED | OUTPATIENT
Start: 2024-01-01 | End: 2024-01-01 | Stop reason: HOSPADM

## 2024-01-01 RX ORDER — ONDANSETRON 2 MG/ML
4 INJECTION INTRAMUSCULAR; INTRAVENOUS EVERY 4 HOURS PRN
Status: DISCONTINUED | OUTPATIENT
Start: 2024-01-01 | End: 2024-01-01 | Stop reason: HOSPADM

## 2024-01-01 RX ORDER — SODIUM CHLORIDE, SODIUM LACTATE, POTASSIUM CHLORIDE, CALCIUM CHLORIDE 600; 310; 30; 20 MG/100ML; MG/100ML; MG/100ML; MG/100ML
100 INJECTION, SOLUTION INTRAVENOUS CONTINUOUS
Status: DISCONTINUED | OUTPATIENT
Start: 2024-01-01 | End: 2024-01-01

## 2024-01-01 RX ORDER — HALOPERIDOL 2 MG/ML
0.5 SOLUTION ORAL
Status: DISCONTINUED | OUTPATIENT
Start: 2024-01-01 | End: 2024-01-01 | Stop reason: HOSPADM

## 2024-01-01 RX ORDER — CEFTRIAXONE 1 G/1
1 INJECTION, POWDER, FOR SOLUTION INTRAMUSCULAR; INTRAVENOUS DAILY
COMMUNITY
Start: 2024-01-01 | End: 2024-01-01

## 2024-01-01 RX ORDER — LORAZEPAM 0.5 MG/1
0.5 TABLET ORAL 2 TIMES DAILY
COMMUNITY

## 2024-01-01 RX ORDER — SODIUM CHLORIDE 0.9 % (FLUSH) 0.9 %
10 SYRINGE (ML) INJECTION EVERY 12 HOURS SCHEDULED
Status: DISCONTINUED | OUTPATIENT
Start: 2024-01-01 | End: 2024-01-01

## 2024-01-01 RX ORDER — HALOPERIDOL 5 MG/ML
0.5 INJECTION INTRAMUSCULAR
Status: DISCONTINUED | OUTPATIENT
Start: 2024-01-01 | End: 2024-01-01 | Stop reason: HOSPADM

## 2024-01-01 RX ORDER — MORPHINE SULFATE 20 MG/ML
10 SOLUTION ORAL
Status: DISCONTINUED | OUTPATIENT
Start: 2024-01-01 | End: 2024-01-01 | Stop reason: HOSPADM

## 2024-01-01 RX ORDER — LIDOCAINE 4 G/G
1 PATCH TOPICAL DAILY PRN
Status: DISCONTINUED | OUTPATIENT
Start: 2024-01-01 | End: 2024-01-01 | Stop reason: HOSPADM

## 2024-01-01 RX ORDER — ARFORMOTEROL TARTRATE 15 UG/2ML
15 SOLUTION RESPIRATORY (INHALATION)
Status: DISCONTINUED | OUTPATIENT
Start: 2024-01-01 | End: 2024-01-01

## 2024-01-01 RX ORDER — LORAZEPAM 0.5 MG/1
0.5 TABLET ORAL
Status: DISCONTINUED | OUTPATIENT
Start: 2024-01-01 | End: 2024-01-01 | Stop reason: HOSPADM

## 2024-01-01 RX ORDER — FAMOTIDINE 20 MG/1
20 TABLET, FILM COATED ORAL DAILY
Status: DISCONTINUED | OUTPATIENT
Start: 2024-01-01 | End: 2024-01-01

## 2024-01-01 RX ORDER — SODIUM CHLORIDE 9 MG/ML
40 INJECTION, SOLUTION INTRAVENOUS AS NEEDED
Status: DISCONTINUED | OUTPATIENT
Start: 2024-01-01 | End: 2024-01-01 | Stop reason: HOSPADM

## 2024-01-01 RX ORDER — HALOPERIDOL 0.5 MG/1
0.5 TABLET ORAL
Status: DISCONTINUED | OUTPATIENT
Start: 2024-01-01 | End: 2024-01-01 | Stop reason: HOSPADM

## 2024-01-01 RX ORDER — NICOTINE 21 MG/24HR
1 PATCH, TRANSDERMAL 24 HOURS TRANSDERMAL DAILY PRN
Status: DISCONTINUED | OUTPATIENT
Start: 2024-01-01 | End: 2024-01-01 | Stop reason: HOSPADM

## 2024-01-01 RX ORDER — POLYETHYLENE GLYCOL 3350 17 G/17G
17 POWDER, FOR SOLUTION ORAL 2 TIMES DAILY PRN
Status: DISCONTINUED | OUTPATIENT
Start: 2024-01-01 | End: 2024-01-01 | Stop reason: HOSPADM

## 2024-01-01 RX ORDER — AMOXICILLIN 250 MG
2 CAPSULE ORAL 2 TIMES DAILY PRN
Status: DISCONTINUED | OUTPATIENT
Start: 2024-01-01 | End: 2024-01-01

## 2024-01-01 RX ORDER — ACETAMINOPHEN 325 MG/1
650 TABLET ORAL EVERY 6 HOURS PRN
Status: DISCONTINUED | OUTPATIENT
Start: 2024-01-01 | End: 2024-01-01 | Stop reason: HOSPADM

## 2024-01-01 RX ORDER — PANTOPRAZOLE SODIUM 40 MG/10ML
40 INJECTION, POWDER, LYOPHILIZED, FOR SOLUTION INTRAVENOUS
Status: DISCONTINUED | OUTPATIENT
Start: 2024-01-01 | End: 2024-01-01

## 2024-01-01 RX ORDER — LORAZEPAM 1 MG/1
1 TABLET ORAL NIGHTLY
COMMUNITY

## 2024-01-01 RX ORDER — PROCHLORPERAZINE EDISYLATE 5 MG/ML
5 INJECTION INTRAMUSCULAR; INTRAVENOUS EVERY 6 HOURS PRN
Status: DISCONTINUED | OUTPATIENT
Start: 2024-01-01 | End: 2024-01-01

## 2024-01-01 RX ADMIN — LORAZEPAM 1 MG: 2 SOLUTION, CONCENTRATE ORAL at 11:24

## 2024-01-01 RX ADMIN — ARFORMOTEROL TARTRATE 15 MCG: 15 SOLUTION RESPIRATORY (INHALATION) at 10:53

## 2024-01-01 RX ADMIN — MORPHINE SULFATE 10 MG: 20 SOLUTION ORAL at 11:24

## 2024-01-01 RX ADMIN — LORAZEPAM 0.5 MG: 2 SOLUTION, CONCENTRATE ORAL at 00:40

## 2024-01-01 RX ADMIN — MORPHINE SULFATE 10 MG: 20 SOLUTION ORAL at 21:30

## 2024-01-01 RX ADMIN — LORAZEPAM 1 MG: 2 SOLUTION, CONCENTRATE ORAL at 21:30

## 2024-01-01 RX ADMIN — Medication 10 ML: at 09:47

## 2024-01-01 RX ADMIN — LORAZEPAM 1 MG: 2 SOLUTION, CONCENTRATE ORAL at 08:12

## 2024-01-01 RX ADMIN — LORAZEPAM 0.5 MG: 2 SOLUTION, CONCENTRATE ORAL at 10:48

## 2024-01-01 RX ADMIN — MORPHINE SULFATE 4 MG: 4 INJECTION, SOLUTION INTRAMUSCULAR; INTRAVENOUS at 21:19

## 2024-01-01 RX ADMIN — BUDESONIDE 0.5 MG: 0.5 SUSPENSION RESPIRATORY (INHALATION) at 10:53

## 2024-01-01 RX ADMIN — PANTOPRAZOLE SODIUM 40 MG: 40 INJECTION, POWDER, FOR SOLUTION INTRAVENOUS at 05:49

## 2024-01-01 RX ADMIN — PANTOPRAZOLE SODIUM 40 MG: 40 INJECTION, POWDER, FOR SOLUTION INTRAVENOUS at 06:27

## 2024-01-01 RX ADMIN — MORPHINE SULFATE 10 MG: 20 SOLUTION ORAL at 08:12

## 2024-01-01 RX ADMIN — MORPHINE SULFATE 10 MG: 20 SOLUTION ORAL at 06:15

## 2024-01-01 RX ADMIN — MORPHINE SULFATE 10 MG: 20 SOLUTION ORAL at 10:48

## 2024-01-01 RX ADMIN — LORAZEPAM 0.5 MG: 2 INJECTION INTRAMUSCULAR; INTRAVENOUS at 21:19

## 2024-01-01 RX ADMIN — MORPHINE SULFATE 10 MG: 20 SOLUTION ORAL at 16:17

## 2024-01-01 RX ADMIN — MORPHINE SULFATE 10 MG: 20 SOLUTION ORAL at 12:10

## 2024-01-01 RX ADMIN — MORPHINE SULFATE 10 MG: 20 SOLUTION ORAL at 00:40

## 2024-01-01 RX ADMIN — LORAZEPAM 1 MG: 2 SOLUTION, CONCENTRATE ORAL at 16:17

## 2024-01-01 RX ADMIN — SODIUM CHLORIDE, POTASSIUM CHLORIDE, SODIUM LACTATE AND CALCIUM CHLORIDE 100 ML/HR: 600; 310; 30; 20 INJECTION, SOLUTION INTRAVENOUS at 09:47

## 2024-01-01 RX ADMIN — MORPHINE SULFATE 10 MG: 20 SOLUTION ORAL at 16:01

## 2024-01-01 RX ADMIN — LORAZEPAM 0.5 MG: 2 SOLUTION, CONCENTRATE ORAL at 06:15

## 2024-03-26 ENCOUNTER — HOSPITAL ENCOUNTER (INPATIENT)
Facility: HOSPITAL | Age: 84
LOS: 9 days | Discharge: NURSING, REHAB OR LONG-TERM CARE | End: 2024-04-04
Attending: EMERGENCY MEDICINE | Admitting: FAMILY MEDICINE
Payer: MEDICARE

## 2024-03-26 ENCOUNTER — APPOINTMENT (OUTPATIENT)
Dept: GENERAL RADIOLOGY | Facility: HOSPITAL | Age: 84
End: 2024-03-26
Payer: MEDICARE

## 2024-03-26 ENCOUNTER — ANESTHESIA EVENT (OUTPATIENT)
Dept: PERIOP | Facility: HOSPITAL | Age: 84
End: 2024-03-26
Payer: MEDICARE

## 2024-03-26 ENCOUNTER — APPOINTMENT (OUTPATIENT)
Dept: CT IMAGING | Facility: HOSPITAL | Age: 84
End: 2024-03-26
Payer: MEDICARE

## 2024-03-26 DIAGNOSIS — Z74.09 IMPAIRED MOBILITY AND ADLS: ICD-10-CM

## 2024-03-26 DIAGNOSIS — S72.002A LEFT DISPLACED FEMORAL NECK FRACTURE: Primary | ICD-10-CM

## 2024-03-26 DIAGNOSIS — F03.90 DEMENTIA WITHOUT BEHAVIORAL DISTURBANCE, PSYCHOTIC DISTURBANCE, MOOD DISTURBANCE, OR ANXIETY, UNSPECIFIED DEMENTIA SEVERITY, UNSPECIFIED DEMENTIA TYPE: ICD-10-CM

## 2024-03-26 DIAGNOSIS — R26.2 DIFFICULTY IN WALKING: ICD-10-CM

## 2024-03-26 DIAGNOSIS — S72.002A CLOSED FRACTURE OF LEFT HIP, INITIAL ENCOUNTER: ICD-10-CM

## 2024-03-26 DIAGNOSIS — R13.10 DYSPHAGIA, UNSPECIFIED TYPE: ICD-10-CM
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
## 2024-03-26 DIAGNOSIS — Z78.9 IMPAIRED MOBILITY AND ADLS: ICD-10-CM

## 2024-03-26 PROBLEM — S72.009A HIP FRACTURE: Status: ACTIVE | Noted: 2024-03-26

## 2024-03-26 PROBLEM — Z86.59 HISTORY OF DEMENTIA: Status: ACTIVE | Noted: 2024-03-26

## 2024-03-26 LAB
ALBUMIN SERPL-MCNC: 4.1 G/DL (ref 3.5–5.2)
ALBUMIN/GLOB SERPL: 1.2 G/DL
ALP SERPL-CCNC: 85 U/L (ref 39–117)
ALT SERPL W P-5'-P-CCNC: 11 U/L (ref 1–41)
ANION GAP SERPL CALCULATED.3IONS-SCNC: 8.9 MMOL/L (ref 5–15)
APTT PPP: 30.2 SECONDS (ref 78–95.9)
AST SERPL-CCNC: 17 U/L (ref 1–40)
BACTERIA UR QL AUTO: ABNORMAL /HPF
BASOPHILS # BLD AUTO: 0.09 10*3/MM3 (ref 0–0.2)
BASOPHILS NFR BLD AUTO: 0.6 % (ref 0–1.5)
BILIRUB SERPL-MCNC: 0.5 MG/DL (ref 0–1.2)
BILIRUB UR QL STRIP: NEGATIVE
BUN SERPL-MCNC: 21 MG/DL (ref 8–23)
BUN/CREAT SERPL: 17.1 (ref 7–25)
CALCIUM SPEC-SCNC: 9.4 MG/DL (ref 8.6–10.5)
CHLORIDE SERPL-SCNC: 97 MMOL/L (ref 98–107)
CLARITY UR: CLEAR
CO2 SERPL-SCNC: 32.1 MMOL/L (ref 22–29)
COLOR UR: YELLOW
CREAT SERPL-MCNC: 1.23 MG/DL (ref 0.76–1.27)
DEPRECATED RDW RBC AUTO: 51.8 FL (ref 37–54)
EGFRCR SERPLBLD CKD-EPI 2021: 59 ML/MIN/1.73
EOSINOPHIL # BLD AUTO: 0.24 10*3/MM3 (ref 0–0.4)
EOSINOPHIL NFR BLD AUTO: 1.7 % (ref 0.3–6.2)
ERYTHROCYTE [DISTWIDTH] IN BLOOD BY AUTOMATED COUNT: 14.3 % (ref 12.3–15.4)
GLOBULIN UR ELPH-MCNC: 3.3 GM/DL
GLUCOSE SERPL-MCNC: 122 MG/DL (ref 65–99)
GLUCOSE UR STRIP-MCNC: NEGATIVE MG/DL
HCT VFR BLD AUTO: 45.2 % (ref 37.5–51)
HGB BLD-MCNC: 14.2 G/DL (ref 13–17.7)
HGB UR QL STRIP.AUTO: ABNORMAL
HYALINE CASTS UR QL AUTO: ABNORMAL /LPF
IMM GRANULOCYTES # BLD AUTO: 0.12 10*3/MM3 (ref 0–0.05)
IMM GRANULOCYTES NFR BLD AUTO: 0.8 % (ref 0–0.5)
INR PPP: 1.1 (ref 0.86–1.15)
KETONES UR QL STRIP: NEGATIVE
LEUKOCYTE ESTERASE UR QL STRIP.AUTO: NEGATIVE
LYMPHOCYTES # BLD AUTO: 1.87 10*3/MM3 (ref 0.7–3.1)
LYMPHOCYTES NFR BLD AUTO: 13.1 % (ref 19.6–45.3)
MCH RBC QN AUTO: 31.1 PG (ref 26.6–33)
MCHC RBC AUTO-ENTMCNC: 31.4 G/DL (ref 31.5–35.7)
MCV RBC AUTO: 99.1 FL (ref 79–97)
MONOCYTES # BLD AUTO: 0.57 10*3/MM3 (ref 0.1–0.9)
MONOCYTES NFR BLD AUTO: 4 % (ref 5–12)
NEUTROPHILS NFR BLD AUTO: 11.41 10*3/MM3 (ref 1.7–7)
NEUTROPHILS NFR BLD AUTO: 79.8 % (ref 42.7–76)
NITRITE UR QL STRIP: NEGATIVE
NRBC BLD AUTO-RTO: 0 /100 WBC (ref 0–0.2)
PH UR STRIP.AUTO: 7.5 [PH] (ref 5–8)
PLATELET # BLD AUTO: 230 10*3/MM3 (ref 140–450)
PMV BLD AUTO: 10.7 FL (ref 6–12)
POTASSIUM SERPL-SCNC: 4.4 MMOL/L (ref 3.5–5.2)
PROT SERPL-MCNC: 7.4 G/DL (ref 6–8.5)
PROT UR QL STRIP: ABNORMAL
PROTHROMBIN TIME: 14.4 SECONDS (ref 11.8–14.9)
RBC # BLD AUTO: 4.56 10*6/MM3 (ref 4.14–5.8)
RBC # UR STRIP: ABNORMAL /HPF
REF LAB TEST METHOD: ABNORMAL
SODIUM SERPL-SCNC: 138 MMOL/L (ref 136–145)
SP GR UR STRIP: 1.02 (ref 1–1.03)
SQUAMOUS #/AREA URNS HPF: ABNORMAL /HPF
UROBILINOGEN UR QL STRIP: ABNORMAL
WBC # UR STRIP: ABNORMAL /HPF
WBC NRBC COR # BLD AUTO: 14.3 10*3/MM3 (ref 3.4–10.8)

## 2024-03-26 PROCEDURE — 25010000002 ONDANSETRON PER 1 MG: Performed by: EMERGENCY MEDICINE

## 2024-03-26 PROCEDURE — 99223 1ST HOSP IP/OBS HIGH 75: CPT | Performed by: FAMILY MEDICINE

## 2024-03-26 PROCEDURE — 25010000002 MORPHINE PER 10 MG: Performed by: FAMILY MEDICINE

## 2024-03-26 PROCEDURE — 81001 URINALYSIS AUTO W/SCOPE: CPT | Performed by: FAMILY MEDICINE

## 2024-03-26 PROCEDURE — 80053 COMPREHEN METABOLIC PANEL: CPT | Performed by: EMERGENCY MEDICINE

## 2024-03-26 PROCEDURE — 85025 COMPLETE CBC W/AUTO DIFF WBC: CPT | Performed by: EMERGENCY MEDICINE

## 2024-03-26 PROCEDURE — 85610 PROTHROMBIN TIME: CPT | Performed by: EMERGENCY MEDICINE

## 2024-03-26 PROCEDURE — 72192 CT PELVIS W/O DYE: CPT

## 2024-03-26 PROCEDURE — 25010000002 MORPHINE PER 10 MG: Performed by: EMERGENCY MEDICINE

## 2024-03-26 PROCEDURE — 71045 X-RAY EXAM CHEST 1 VIEW: CPT

## 2024-03-26 PROCEDURE — 85730 THROMBOPLASTIN TIME PARTIAL: CPT | Performed by: EMERGENCY MEDICINE

## 2024-03-26 PROCEDURE — 93010 ELECTROCARDIOGRAM REPORT: CPT | Performed by: INTERNAL MEDICINE

## 2024-03-26 PROCEDURE — 93005 ELECTROCARDIOGRAM TRACING: CPT | Performed by: EMERGENCY MEDICINE

## 2024-03-26 PROCEDURE — 73502 X-RAY EXAM HIP UNI 2-3 VIEWS: CPT

## 2024-03-26 PROCEDURE — 99285 EMERGENCY DEPT VISIT HI MDM: CPT

## 2024-03-26 RX ORDER — HEPARIN SODIUM 5000 [USP'U]/ML
5000 INJECTION, SOLUTION INTRAVENOUS; SUBCUTANEOUS EVERY 12 HOURS SCHEDULED
Status: DISCONTINUED | OUTPATIENT
Start: 2024-03-27 | End: 2024-03-27

## 2024-03-26 RX ORDER — SODIUM CHLORIDE 9 MG/ML
40 INJECTION, SOLUTION INTRAVENOUS AS NEEDED
Status: DISCONTINUED | OUTPATIENT
Start: 2024-03-26 | End: 2024-04-04 | Stop reason: HOSPADM

## 2024-03-26 RX ORDER — BISACODYL 10 MG
10 SUPPOSITORY, RECTAL RECTAL DAILY PRN
Status: DISCONTINUED | OUTPATIENT
Start: 2024-03-26 | End: 2024-04-04 | Stop reason: HOSPADM

## 2024-03-26 RX ORDER — MORPHINE SULFATE 2 MG/ML
2 INJECTION, SOLUTION INTRAMUSCULAR; INTRAVENOUS ONCE
Status: COMPLETED | OUTPATIENT
Start: 2024-03-26 | End: 2024-03-26

## 2024-03-26 RX ORDER — ONDANSETRON 2 MG/ML
4 INJECTION INTRAMUSCULAR; INTRAVENOUS ONCE
Status: COMPLETED | OUTPATIENT
Start: 2024-03-26 | End: 2024-03-26

## 2024-03-26 RX ORDER — NALOXONE HCL 0.4 MG/ML
0.4 VIAL (ML) INJECTION
Status: DISCONTINUED | OUTPATIENT
Start: 2024-03-26 | End: 2024-03-27

## 2024-03-26 RX ORDER — POLYETHYLENE GLYCOL 3350 17 G/17G
17 POWDER, FOR SOLUTION ORAL DAILY PRN
Status: DISCONTINUED | OUTPATIENT
Start: 2024-03-26 | End: 2024-04-04 | Stop reason: HOSPADM

## 2024-03-26 RX ORDER — SODIUM CHLORIDE 0.9 % (FLUSH) 0.9 %
10 SYRINGE (ML) INJECTION EVERY 12 HOURS SCHEDULED
Status: DISCONTINUED | OUTPATIENT
Start: 2024-03-26 | End: 2024-04-04 | Stop reason: HOSPADM

## 2024-03-26 RX ORDER — IPRATROPIUM BROMIDE AND ALBUTEROL SULFATE 2.5; .5 MG/3ML; MG/3ML
3 SOLUTION RESPIRATORY (INHALATION) 2 TIMES DAILY
Status: DISCONTINUED | OUTPATIENT
Start: 2024-03-26 | End: 2024-03-28

## 2024-03-26 RX ORDER — AMLODIPINE BESYLATE 5 MG/1
10 TABLET ORAL DAILY
Status: DISCONTINUED | OUTPATIENT
Start: 2024-03-27 | End: 2024-03-27

## 2024-03-26 RX ORDER — SODIUM CHLORIDE, SODIUM LACTATE, POTASSIUM CHLORIDE, CALCIUM CHLORIDE 600; 310; 30; 20 MG/100ML; MG/100ML; MG/100ML; MG/100ML
75 INJECTION, SOLUTION INTRAVENOUS CONTINUOUS
Status: ACTIVE | OUTPATIENT
Start: 2024-03-26 | End: 2024-03-27

## 2024-03-26 RX ORDER — MORPHINE SULFATE 2 MG/ML
2 INJECTION, SOLUTION INTRAMUSCULAR; INTRAVENOUS EVERY 4 HOURS PRN
Status: DISCONTINUED | OUTPATIENT
Start: 2024-03-26 | End: 2024-03-31

## 2024-03-26 RX ORDER — AMOXICILLIN 250 MG
2 CAPSULE ORAL 2 TIMES DAILY PRN
Status: DISCONTINUED | OUTPATIENT
Start: 2024-03-26 | End: 2024-04-04 | Stop reason: HOSPADM

## 2024-03-26 RX ORDER — SODIUM CHLORIDE 0.9 % (FLUSH) 0.9 %
10 SYRINGE (ML) INJECTION AS NEEDED
Status: DISCONTINUED | OUTPATIENT
Start: 2024-03-26 | End: 2024-04-04 | Stop reason: HOSPADM

## 2024-03-26 RX ORDER — BISACODYL 5 MG/1
5 TABLET, DELAYED RELEASE ORAL DAILY PRN
Status: DISCONTINUED | OUTPATIENT
Start: 2024-03-26 | End: 2024-04-04 | Stop reason: HOSPADM

## 2024-03-26 RX ORDER — TAMSULOSIN HYDROCHLORIDE 0.4 MG/1
0.4 CAPSULE ORAL DAILY
Status: DISCONTINUED | OUTPATIENT
Start: 2024-03-27 | End: 2024-04-04 | Stop reason: HOSPADM

## 2024-03-26 RX ADMIN — MORPHINE SULFATE 2 MG: 2 INJECTION, SOLUTION INTRAMUSCULAR; INTRAVENOUS at 15:52

## 2024-03-26 RX ADMIN — MORPHINE SULFATE 2 MG: 2 INJECTION, SOLUTION INTRAMUSCULAR; INTRAVENOUS at 23:14

## 2024-03-26 RX ADMIN — ONDANSETRON 4 MG: 2 INJECTION INTRAMUSCULAR; INTRAVENOUS at 15:59

## 2024-03-26 NOTE — ED PROVIDER NOTES
Time: 3:29 PM EDT  Date of encounter:  3/26/2024  Independent Historian/Clinical History and Information was obtained by:   Patient and Family    History is limited by: Dementia    Chief Complaint: Fall, Hip Injury      History of Present Illness:  Patient is a 83 y.o. year old male who presents to the emergency department for evaluation of Lt hip injury post fall. Per spouse, pt fell asleep while sitting on a stool at the counter, landing on the floor & injuring Lt hip. Pt c/o Lip pain 8/10. Unable to describe pain or verbalize alleviating/aggravating factors.  Patient denies hitting his head.  There is no LOC.  Patient denies any numbness or tingling to his extremities.        Patient Care Team  Primary Care Provider: Honorio Field MD    Past Medical History:     No Known Allergies  Past Medical History:   Diagnosis Date    Essential hypertension     Gout     High cholesterol 12/19/2018    Medication management 12/19/2018    Nicotine dependence, cigarettes, with other nicotine-induced disorders 09/19/2018     History reviewed. No pertinent surgical history.  Family History   Problem Relation Age of Onset    Heart disease Father     Brain cancer Brother        Home Medications:  Prior to Admission medications    Medication Sig Start Date End Date Taking? Authorizing Provider   allopurinol (ZYLOPRIM) 300 MG tablet Take 1 tablet by mouth Daily. 7/31/23   David Singh III, MD   amLODIPine (NORVASC) 10 MG tablet Take 1 tablet by mouth Daily. 7/31/23   David Singh III, MD   hydroCHLOROthiazide (HYDRODIURIL) 25 MG tablet Take 1 tablet by mouth Daily. 7/31/23   David Singh III, MD   tamsulosin (FLOMAX) 0.4 MG capsule 24 hr capsule Take 1 capsule by mouth Daily. 7/31/23   David Singh III, MD        Social History:   Social History     Tobacco Use    Smoking status: Every Day     Current packs/day: 1.00     Average packs/day: 1 pack/day for 62.2 years (62.2 ttl pk-yrs)     Types: Cigarettes     Start  "date: 1962    Smokeless tobacco: Never    Tobacco comments:     pt declines LDCT scan, states feels fine does not want   Vaping Use    Vaping status: Never Used   Substance Use Topics    Alcohol use: Not Currently     Alcohol/week: 7.0 standard drinks of alcohol     Types: 7 Shots of liquor per week     Comment: Current some day, wife states that he is not drinking she puts water in the vodka container but he is not aware of this.    Drug use: Never         Review of Systems:  Review of Systems   Constitutional:  Negative for chills and fever.   HENT:  Negative for congestion, ear pain and sore throat.    Eyes:  Negative for pain.   Respiratory:  Negative for cough, chest tightness and shortness of breath.    Cardiovascular:  Negative for chest pain.   Gastrointestinal:  Negative for abdominal pain, diarrhea, nausea and vomiting.   Genitourinary:  Negative for flank pain and hematuria.   Musculoskeletal:  Positive for arthralgias (Left hip). Negative for joint swelling.   Skin:  Negative for pallor.   Neurological:  Negative for seizures, numbness and headaches.   All other systems reviewed and are negative.       Physical Exam:  /69 (BP Location: Left arm, Patient Position: Lying)   Pulse 75   Temp 97.9 °F (36.6 °C) (Oral)   Resp 16   Ht 170.2 cm (67\")   Wt 67.8 kg (149 lb 7.6 oz)   SpO2 (!) 88%   BMI 23.41 kg/m²   Vital signs were reviewed under triage note.  General appearance - Patient appears well-developed and well-nourished.  Patient is in no acute distress.  Head - Normocephalic, atraumatic.  Pupils - Equal, round, reactive to light.  Extraocular muscles are intact.  Conjunctiva is clear.  Nasal - Normal inspection.  No evidence of trauma or epistaxis.  Tympanic membranes - Gray, intact without erythema or retractions.  Oral mucosa - Pink and moist without lesions or erythema.  Uvula is midline.  Chest wall - Atraumatic.  Chest wall is nontender.  There are no vesicular rashes noted.  Neck - " Supple.  Trachea was midline.  There is no palpable lymphadenopathy or thyromegaly.  There are no meningeal signs  Lungs - Clear to auscultation and percussion bilaterally.  Heart - Regular rate and rhythm without any murmurs, clicks, or gallops.  Abdomen - Soft.  Bowel sounds are present.  There is no palpable tenderness.  There is no rebound, guarding, or rigidity.  There are no palpable masses.  There are no pulsatile masses.  Back - Spine is straight and midline.  There is no CVA tenderness.  Extremities - Intact x4.  Left lower extremity is shortened and externally rotated.  Patient has severe tenderness with palpation or any manipulation of the left leg.  There is no palpable edema.  Pulses are intact x4 and equal.  Neurologic - Patient is awake, alert, and oriented x3.  Cranial nerves II through XII are grossly intact.  Motor and sensory functions grossly intact.  Cerebellar function was normal.  Integument - There are no rashes.  There are no petechia or purpura lesions noted.  There are no vesicular lesions noted.        Procedures:  Procedures      Medical Decision Making:      Comorbidities that affect care:    Hypertension, hyperlipidemia, gout, active smoker    External Notes reviewed:    Previous Clinic Note: 7/31/23 [facility] with Dr. Singh was reviewed by me.      The following orders were placed and all results were independently analyzed by me:  Orders Placed This Encounter   Procedures    XR Chest 1 View    XR Hip With or Without Pelvis 2 - 3 View Left    CT Pelvis Without Contrast    Comprehensive Metabolic Panel    Protime-INR    aPTT    CBC Auto Differential    NPO Diet NPO Type: Strict NPO    Hospitalist (on-call MD unless specified)    Orthopedics (on-call MD unless specified)    ECG 12 Lead Pre-Op / Pre-Procedure    Insert Peripheral IV    CBC & Differential       Medications Given in the Emergency Department:  Medications   sodium chloride 0.9 % flush 10 mL (has no administration in time  range)   morphine injection 2 mg (2 mg Intravenous Given 3/26/24 1552)   ondansetron (ZOFRAN) injection 4 mg (4 mg Intravenous Given 3/26/24 2489)        ED Course:    ED Course as of 03/27/24 2137   Wed Mar 27, 2024   2135 EKG performed at 1635 was interpreted by me showing a normal sinus rhythm with a ventricular rate of 81 bpm.  TN interval is 86 ms.  P waves are normal.  QRS interval is normal.  Axis is regular 115 degrees.  Patient has probable right ventricular hypertrophy.  There is no acute ischemic ST or T wave change identified.  QT corrected was 442 ms. [TB]      ED Course User Index  [TB] Wiflred Hamlin DO   The patient was seen and evaluated in the ED by me.  The above history and physical examination was performed as documented.  Diagnostic data was obtained.  Results reviewed.  Findings were discussed with the patient and family.  Dr. Carlson was consulted orthopedics.  He requested a CT scan that would help him evaluate how to fix the of the hip.  Hospital service was consulted for primary admission.    Labs:    Lab Results (last 24 hours)       Procedure Component Value Units Date/Time    CBC & Differential [[id number]]  (Abnormal) Collected: 03/26/24 1553    Specimen: Blood Updated: 03/26/24 1606    Narrative:      The following orders were created for panel order CBC & Differential.  Procedure                               Abnormality         Status                     ---------                               -----------         ------                     CBC Auto Differential[[id number]]        Abnormal            Final result                 Please view results for these tests on the individual orders.    Comprehensive Metabolic Panel [[id number]]  (Abnormal) Collected: 03/26/24 1553    Specimen: Blood Updated: 03/26/24 1631     Glucose 122 mg/dL      BUN 21 mg/dL      Creatinine 1.23 mg/dL      Sodium 138 mmol/L      Potassium 4.4 mmol/L      Chloride 97 mmol/L      CO2 32.1 mmol/L      Calcium 9.4  mg/dL      Total Protein 7.4 g/dL      Albumin 4.1 g/dL      ALT (SGPT) 11 U/L      AST (SGOT) 17 U/L      Alkaline Phosphatase 85 U/L      Total Bilirubin 0.5 mg/dL      Globulin 3.3 gm/dL      A/G Ratio 1.2 g/dL      BUN/Creatinine Ratio 17.1     Anion Gap 8.9 mmol/L      eGFR 59.0 mL/min/1.73     Narrative:      GFR Normal >60  Chronic Kidney Disease <60  Kidney Failure <15    The GFR formula is only valid for adults with stable renal function between ages 18 and 70.    Protime-INR [[id number]]  (Normal) Collected: 03/26/24 1553    Specimen: Blood Updated: 03/26/24 1621     Protime 14.4 Seconds      INR 1.10    Narrative:      Suggested Therapeutic Ranges For Oral Anticoagulant Therapy:  Level of Therapy                      INR Target Range  Standard Dose                            2.0-3.0  High Dose                                2.5-3.5  Patients not receiving anticoagulant  Therapy Normal Range                     0.86-1.15    aPTT [[id number]]  (Abnormal) Collected: 03/26/24 1553    Specimen: Blood Updated: 03/26/24 1621     PTT 30.2 seconds     CBC Auto Differential [[id number]]  (Abnormal) Collected: 03/26/24 1553    Specimen: Blood Updated: 03/26/24 1606     WBC 14.30 10*3/mm3      RBC 4.56 10*6/mm3      Hemoglobin 14.2 g/dL      Hematocrit 45.2 %      MCV 99.1 fL      MCH 31.1 pg      MCHC 31.4 g/dL      RDW 14.3 %      RDW-SD 51.8 fl      MPV 10.7 fL      Platelets 230 10*3/mm3      Neutrophil % 79.8 %      Lymphocyte % 13.1 %      Monocyte % 4.0 %      Eosinophil % 1.7 %      Basophil % 0.6 %      Immature Grans % 0.8 %      Neutrophils, Absolute 11.41 10*3/mm3      Lymphocytes, Absolute 1.87 10*3/mm3      Monocytes, Absolute 0.57 10*3/mm3      Eosinophils, Absolute 0.24 10*3/mm3      Basophils, Absolute 0.09 10*3/mm3      Immature Grans, Absolute 0.12 10*3/mm3      nRBC 0.0 /100 WBC              Imaging:    XR Hip With or Without Pelvis 2 - 3 View Left    Result Date: 3/26/2024  DATE OF EXAM: 3/26/2024  3:40 PM  PROCEDURE: XR HIP W OR WO PELVIS 2-3 VIEW LEFT-  INDICATIONS: Left hip pain after fall  COMPARISON: No Comparisons Available  TECHNIQUE: AP and Lateral views of the left hip and AP Pelvis were obtained.   FINDINGS: There is foreshortening of the left femoral neck concerning for fracture. The pelvic bones look intact. An acute abnormality of the right hip is not apparent.      1.  There is appearance to the left femur that is concerning for femoral neck fracture with suggested foreshortening of the femoral neck. Additional imaging may be helpful to better assess this.   Electronically Signed By-Aldo العراقي MD On:3/26/2024 4:34 PM      XR Chest 1 View    Result Date: 3/26/2024  XR CHEST 1 VW-  Date of Exam: 3/26/2024 3:40 PM  Indication: Preop.  Comparison Exams: CT chest from August 16, 2022  Technique: Single AP chest radiograph  FINDINGS: The lungs are clear. The heart and mediastinal contours appear normal. The pulmonary vasculature appears normal. The osseous structures appear intact.      No acute cardiopulmonary process identified.   Electronically Signed By-Osito Dutton MD On:3/26/2024 4:32 PM         Differential Diagnosis and Discussion:    Orthopedic Injuries: Differential diagnosis includes but is not limited to fractures, soft tissue injuries, dislocations, contusions, ligamentous injuries, tendon injuries, nerve injuries, compartment syndrome, bursitis, and vascular injuries.    All labs were reviewed and interpreted by me.  All X-rays impressions were independently interpreted by me.  EKG was interpreted by me.  CT scan radiology impression was interpreted by me.    MDM               Patient Care Considerations:          Consultants/Shared Management Plan:    Hospitalist: I have discussed the case with Dr. Khan who agrees to accept the patient for admission.  Consultant: I have discussed the case with Dr. Carlson who agrees to consult on the patient.    Social Determinants of  Health:    Patient has presented with family members who are responsible, reliable and will ensure follow up care.      Disposition and Care Coordination:    Admit:   Through independent evaluation of the patient's history, physical, and imperical data, the patient meets criteria for inpatient admission to the hospital.        Final diagnoses:   Left displaced femoral neck fracture   Fall from chair, initial encounter   Dementia without behavioral disturbance, psychotic disturbance, mood disturbance, or anxiety, unspecified dementia severity, unspecified dementia type        ED Disposition       ED Disposition   Intended Admit    Condition   --    Comment   --               This medical record created using voice recognition software.            Wilfred Hamlin DO  03/27/24 0712

## 2024-03-26 NOTE — ANESTHESIA PREPROCEDURE EVALUATION
Anesthesia Evaluation     Patient summary reviewed and Nursing notes reviewed   no history of anesthetic complications:   NPO Solid Status: > 8 hours  NPO Liquid Status: > 2 hours           Airway   Mallampati: III  TM distance: >3 FB  Neck ROM: full  No difficulty expected  Dental    (+) lower dentures and upper dentures    Pulmonary - normal exam    breath sounds clear to auscultation  (+) a smoker Current, Abstained day of surgery,  Cardiovascular - normal exam  Exercise tolerance: good (4-7 METS)    Rhythm: regular  Rate: normal    (+) hypertension, hyperlipidemia      Neuro/Psych  (+) dementia, poor historian.    ROS Comment: Alert and pleasant, mildly confused with answers  GI/Hepatic/Renal/Endo - negative ROS     Musculoskeletal (-) negative ROS    Abdominal    Substance History - negative use     OB/GYN negative ob/gyn ROS         Other - negative ROS       ROS/Med Hx Other: PAT Nursing Notes unavailable.       Phys Exam Other: Poor effort with airway exam        Latest Reference Range & Units 03/26/24 15:53   Sodium 136 - 145 mmol/L 138   Potassium 3.5 - 5.2 mmol/L 4.4   Chloride 98 - 107 mmol/L 97 (L)   CO2 22.0 - 29.0 mmol/L 32.1 (H)   Anion Gap 5.0 - 15.0 mmol/L 8.9   BUN 8 - 23 mg/dL 21   Creatinine 0.76 - 1.27 mg/dL 1.23   BUN/Creatinine Ratio 7.0 - 25.0  17.1   eGFR >60.0 mL/min/1.73 59.0 (L)   Glucose 65 - 99 mg/dL 122 (H)   Calcium 8.6 - 10.5 mg/dL 9.4   Alkaline Phosphatase 39 - 117 U/L 85   Total Protein 6.0 - 8.5 g/dL 7.4   Albumin 3.5 - 5.2 g/dL 4.1   Globulin gm/dL 3.3   A/G Ratio g/dL 1.2   AST (SGOT) 1 - 40 U/L 17   ALT (SGPT) 1 - 41 U/L 11   Total Bilirubin 0.0 - 1.2 mg/dL 0.5   (L): Data is abnormally low  (H): Data is abnormally high     Latest Reference Range & Units 03/26/24 15:53   Hemoglobin 13.0 - 17.7 g/dL 14.2   Hematocrit 37.5 - 51.0 % 45.2     ABNORMAL ECG - 3/26/24  Sinus or ectopic atrial rhythm  Short IA interval  Probable right ventricular hypertrophy  No previous ECG  available for comparison    Cxr  IMPRESSION:  No acute cardiopulmonary process identified.             Anesthesia Plan    ASA 3     general     Reason for not using neuraxial anesthesia or peripheral nerve block: Patient Preference  (Patient understands anesthesia not responsible for dental damage.  Regional anesthesia options discussed; patient declines regional block.    03/26/24 15:53  Sodium: 138  Potassium: 4.4  Chloride: 97 (L)  CO2: 32.1 (H)  Anion Gap: 8.9  BUN: 21  Creatinine: 1.23  BUN/Creatinine Ratio: 17.1  eGFR: 59.0 (L)  Glucose: 122 (H)  Calcium: 9.4  Alkaline Phosphatase: 85    03/26/24 15:53  WBC: 14.30 (H)  RBC: 4.56  Hemoglobin: 14.2  Hematocrit: 45.2  Platelets: 230      (H): Data is abnormally high    (L): Data is abnormally low  (H): Data is abnormally high)  intravenous induction     Anesthetic plan, risks, benefits, and alternatives have been provided, discussed and informed consent has been obtained with: patient.  Pre-procedure education provided  Plan discussed with CRNA.        CODE STATUS:

## 2024-03-27 ENCOUNTER — APPOINTMENT (OUTPATIENT)
Dept: GENERAL RADIOLOGY | Facility: HOSPITAL | Age: 84
End: 2024-03-27
Payer: MEDICARE

## 2024-03-27 ENCOUNTER — ANESTHESIA (OUTPATIENT)
Dept: PERIOP | Facility: HOSPITAL | Age: 84
End: 2024-03-27
Payer: MEDICARE

## 2024-03-27 LAB
D DIMER PPP FEU-MCNC: 4.79 MCGFEU/ML (ref 0–0.81)
GLUCOSE BLDC GLUCOMTR-MCNC: 126 MG/DL (ref 70–99)
NT-PROBNP SERPL-MCNC: 602.7 PG/ML (ref 0–1800)

## 2024-03-27 PROCEDURE — C1713 ANCHOR/SCREW BN/BN,TIS/BN: HCPCS | Performed by: ORTHOPAEDIC SURGERY

## 2024-03-27 PROCEDURE — 94640 AIRWAY INHALATION TREATMENT: CPT

## 2024-03-27 PROCEDURE — 25010000002 KETOROLAC TROMETHAMINE PER 15 MG: Performed by: ORTHOPAEDIC SURGERY

## 2024-03-27 PROCEDURE — 25010000002 CEFAZOLIN PER 500 MG: Performed by: ORTHOPAEDIC SURGERY

## 2024-03-27 PROCEDURE — 94761 N-INVAS EAR/PLS OXIMETRY MLT: CPT

## 2024-03-27 PROCEDURE — 25010000002 FENTANYL CITRATE (PF) 50 MCG/ML SOLUTION: Performed by: NURSE ANESTHETIST, CERTIFIED REGISTERED

## 2024-03-27 PROCEDURE — 73502 X-RAY EXAM HIP UNI 2-3 VIEWS: CPT

## 2024-03-27 PROCEDURE — 25010000002 MORPHINE PER 10 MG: Performed by: ORTHOPAEDIC SURGERY

## 2024-03-27 PROCEDURE — 25810000003 LACTATED RINGERS PER 1000 ML: Performed by: NURSE ANESTHETIST, CERTIFIED REGISTERED

## 2024-03-27 PROCEDURE — 94760 N-INVAS EAR/PLS OXIMETRY 1: CPT

## 2024-03-27 PROCEDURE — 99233 SBSQ HOSP IP/OBS HIGH 50: CPT | Performed by: INTERNAL MEDICINE

## 2024-03-27 PROCEDURE — 25010000002 ONDANSETRON PER 1 MG: Performed by: INTERNAL MEDICINE

## 2024-03-27 PROCEDURE — 83880 ASSAY OF NATRIURETIC PEPTIDE: CPT | Performed by: INTERNAL MEDICINE

## 2024-03-27 PROCEDURE — 25810000003 LACTATED RINGERS PER 1000 ML: Performed by: ORTHOPAEDIC SURGERY

## 2024-03-27 PROCEDURE — 85379 FIBRIN DEGRADATION QUANT: CPT | Performed by: INTERNAL MEDICINE

## 2024-03-27 PROCEDURE — 99222 1ST HOSP IP/OBS MODERATE 55: CPT | Performed by: ORTHOPAEDIC SURGERY

## 2024-03-27 PROCEDURE — C1776 JOINT DEVICE (IMPLANTABLE): HCPCS | Performed by: ORTHOPAEDIC SURGERY

## 2024-03-27 PROCEDURE — 25010000002 EPINEPHRINE 1 MG/ML SOLUTION: Performed by: ORTHOPAEDIC SURGERY

## 2024-03-27 PROCEDURE — 82948 REAGENT STRIP/BLOOD GLUCOSE: CPT

## 2024-03-27 PROCEDURE — 94799 UNLISTED PULMONARY SVC/PX: CPT

## 2024-03-27 PROCEDURE — 25010000002 PROPOFOL 200 MG/20ML EMULSION: Performed by: NURSE ANESTHETIST, CERTIFIED REGISTERED

## 2024-03-27 PROCEDURE — 71045 X-RAY EXAM CHEST 1 VIEW: CPT

## 2024-03-27 PROCEDURE — 25010000002 KETOROLAC TROMETHAMINE PER 15 MG: Performed by: INTERNAL MEDICINE

## 2024-03-27 PROCEDURE — 76000 FLUOROSCOPY <1 HR PHYS/QHP: CPT

## 2024-03-27 PROCEDURE — 27236 TREAT THIGH FRACTURE: CPT | Performed by: ORTHOPAEDIC SURGERY

## 2024-03-27 PROCEDURE — 25010000002 ROPIVACAINE PER 1 MG: Performed by: ORTHOPAEDIC SURGERY

## 2024-03-27 PROCEDURE — 25010000002 SUGAMMADEX 200 MG/2ML SOLUTION: Performed by: NURSE ANESTHETIST, CERTIFIED REGISTERED

## 2024-03-27 PROCEDURE — 25810000003 LACTATED RINGERS PER 1000 ML: Performed by: FAMILY MEDICINE

## 2024-03-27 PROCEDURE — 25010000002 CEFAZOLIN SODIUM 2 G RECONSTITUTED SOLUTION: Performed by: ORTHOPAEDIC SURGERY

## 2024-03-27 PROCEDURE — 0SRS019 REPLACEMENT OF LEFT HIP JOINT, FEMORAL SURFACE WITH METAL SYNTHETIC SUBSTITUTE, CEMENTED, OPEN APPROACH: ICD-10-PCS | Performed by: ORTHOPAEDIC SURGERY

## 2024-03-27 DEVICE — CAP PRT HIP BIPOL: Type: IMPLANTABLE DEVICE | Site: HIP | Status: FUNCTIONAL

## 2024-03-27 DEVICE — CUP ACET RINGLOC BIPOL 28MM 46MM: Type: IMPLANTABLE DEVICE | Site: ACETABULUM | Status: FUNCTIONAL

## 2024-03-27 DEVICE — HD FEM/HIP UNIV COCR 12/14TPR 28MM MIN3.5: Type: IMPLANTABLE DEVICE | Site: ACETABULUM | Status: FUNCTIONAL

## 2024-03-27 DEVICE — AVENIR® STANDARD STEM CEMENTED 3
Type: IMPLANTABLE DEVICE | Site: HIP | Status: FUNCTIONAL
Brand: AVENIR®

## 2024-03-27 DEVICE — KT PREP/CMT FEM QUICKUSE 2BONEPLUG PE: Type: IMPLANTABLE DEVICE | Site: HIP | Status: FUNCTIONAL

## 2024-03-27 DEVICE — CMT BONE PALACOS R HI/VISC 1X40: Type: IMPLANTABLE DEVICE | Site: HIP | Status: FUNCTIONAL

## 2024-03-27 RX ORDER — ENOXAPARIN SODIUM 100 MG/ML
40 INJECTION SUBCUTANEOUS DAILY
Status: DISCONTINUED | OUTPATIENT
Start: 2024-03-28 | End: 2024-03-28

## 2024-03-27 RX ORDER — HYDROCODONE BITARTRATE AND ACETAMINOPHEN 7.5; 325 MG/1; MG/1
1 TABLET ORAL EVERY 4 HOURS PRN
Status: DISPENSED | OUTPATIENT
Start: 2024-03-27 | End: 2024-04-03

## 2024-03-27 RX ORDER — KETOROLAC TROMETHAMINE 15 MG/ML
15 INJECTION, SOLUTION INTRAMUSCULAR; INTRAVENOUS EVERY 6 HOURS PRN
Status: DISCONTINUED | OUTPATIENT
Start: 2024-03-27 | End: 2024-03-27

## 2024-03-27 RX ORDER — ACETAMINOPHEN 160 MG/5ML
650 SOLUTION ORAL EVERY 4 HOURS PRN
Status: DISCONTINUED | OUTPATIENT
Start: 2024-03-27 | End: 2024-04-04 | Stop reason: HOSPADM

## 2024-03-27 RX ORDER — ALLOPURINOL 300 MG/1
300 TABLET ORAL DAILY
Status: DISCONTINUED | OUTPATIENT
Start: 2024-03-27 | End: 2024-04-04 | Stop reason: HOSPADM

## 2024-03-27 RX ORDER — ONDANSETRON 4 MG/1
4 TABLET, ORALLY DISINTEGRATING ORAL EVERY 6 HOURS PRN
Status: DISCONTINUED | OUTPATIENT
Start: 2024-03-27 | End: 2024-04-04 | Stop reason: HOSPADM

## 2024-03-27 RX ORDER — SODIUM CHLORIDE, SODIUM LACTATE, POTASSIUM CHLORIDE, CALCIUM CHLORIDE 600; 310; 30; 20 MG/100ML; MG/100ML; MG/100ML; MG/100ML
75 INJECTION, SOLUTION INTRAVENOUS CONTINUOUS
Status: ACTIVE | OUTPATIENT
Start: 2024-03-27 | End: 2024-03-27

## 2024-03-27 RX ORDER — PROMETHAZINE HYDROCHLORIDE 25 MG/1
12.5 TABLET ORAL EVERY 6 HOURS PRN
Status: DISCONTINUED | OUTPATIENT
Start: 2024-03-27 | End: 2024-04-04 | Stop reason: HOSPADM

## 2024-03-27 RX ORDER — MAGNESIUM HYDROXIDE 1200 MG/15ML
LIQUID ORAL AS NEEDED
Status: DISCONTINUED | OUTPATIENT
Start: 2024-03-27 | End: 2024-03-27 | Stop reason: HOSPADM

## 2024-03-27 RX ORDER — BISACODYL 10 MG
10 SUPPOSITORY, RECTAL RECTAL DAILY PRN
Status: DISCONTINUED | OUTPATIENT
Start: 2024-03-27 | End: 2024-03-27

## 2024-03-27 RX ORDER — ACETAMINOPHEN 500 MG
1000 TABLET ORAL EVERY 8 HOURS
Status: DISPENSED | OUTPATIENT
Start: 2024-03-27 | End: 2024-03-29

## 2024-03-27 RX ORDER — AMOXICILLIN 250 MG
2 CAPSULE ORAL 2 TIMES DAILY
Status: DISCONTINUED | OUTPATIENT
Start: 2024-03-27 | End: 2024-04-04 | Stop reason: HOSPADM

## 2024-03-27 RX ORDER — SODIUM CHLORIDE 0.9 % (FLUSH) 0.9 %
10 SYRINGE (ML) INJECTION AS NEEDED
Status: DISCONTINUED | OUTPATIENT
Start: 2024-03-27 | End: 2024-04-04 | Stop reason: HOSPADM

## 2024-03-27 RX ORDER — EPHEDRINE SULFATE 50 MG/ML
25 INJECTION, SOLUTION INTRAVENOUS ONCE
Status: COMPLETED | OUTPATIENT
Start: 2024-03-27 | End: 2024-03-27

## 2024-03-27 RX ORDER — ROCURONIUM BROMIDE 10 MG/ML
INJECTION, SOLUTION INTRAVENOUS AS NEEDED
Status: DISCONTINUED | OUTPATIENT
Start: 2024-03-27 | End: 2024-03-27 | Stop reason: SURG

## 2024-03-27 RX ORDER — HYDROCODONE BITARTRATE AND ACETAMINOPHEN 7.5; 325 MG/1; MG/1
2 TABLET ORAL EVERY 4 HOURS PRN
Status: ACTIVE | OUTPATIENT
Start: 2024-03-27 | End: 2024-04-03

## 2024-03-27 RX ORDER — ALUMINA, MAGNESIA, AND SIMETHICONE 2400; 2400; 240 MG/30ML; MG/30ML; MG/30ML
15 SUSPENSION ORAL EVERY 6 HOURS PRN
Status: DISCONTINUED | OUTPATIENT
Start: 2024-03-27 | End: 2024-04-04 | Stop reason: HOSPADM

## 2024-03-27 RX ORDER — NALOXONE HCL 0.4 MG/ML
0.4 VIAL (ML) INJECTION
Status: DISCONTINUED | OUTPATIENT
Start: 2024-03-27 | End: 2024-04-04 | Stop reason: HOSPADM

## 2024-03-27 RX ORDER — BUPIVACAINE HCL/0.9 % NACL/PF 0.1 %
2000 PLASTIC BAG, INJECTION (ML) EPIDURAL ONCE
Status: COMPLETED | OUTPATIENT
Start: 2024-03-27 | End: 2024-03-27

## 2024-03-27 RX ORDER — ACETAMINOPHEN 650 MG/1
650 SUPPOSITORY RECTAL EVERY 4 HOURS PRN
Status: DISCONTINUED | OUTPATIENT
Start: 2024-03-27 | End: 2024-04-04 | Stop reason: HOSPADM

## 2024-03-27 RX ORDER — FAMOTIDINE 20 MG/1
40 TABLET, FILM COATED ORAL DAILY
Status: DISCONTINUED | OUTPATIENT
Start: 2024-03-27 | End: 2024-03-27

## 2024-03-27 RX ORDER — BUPIVACAINE HCL/0.9 % NACL/PF 0.1 %
2000 PLASTIC BAG, INJECTION (ML) EPIDURAL EVERY 8 HOURS
Qty: 200 ML | Refills: 0 | Status: COMPLETED | OUTPATIENT
Start: 2024-03-27 | End: 2024-03-28

## 2024-03-27 RX ORDER — ACETAMINOPHEN 325 MG/1
325 TABLET ORAL EVERY 4 HOURS PRN
Status: DISCONTINUED | OUTPATIENT
Start: 2024-03-27 | End: 2024-04-04 | Stop reason: HOSPADM

## 2024-03-27 RX ORDER — SODIUM CHLORIDE 0.9 % (FLUSH) 0.9 %
10 SYRINGE (ML) INJECTION EVERY 12 HOURS SCHEDULED
Status: DISCONTINUED | OUTPATIENT
Start: 2024-03-27 | End: 2024-04-04 | Stop reason: HOSPADM

## 2024-03-27 RX ORDER — CHOLECALCIFEROL (VITAMIN D3) 125 MCG
5 CAPSULE ORAL NIGHTLY PRN
Status: DISCONTINUED | OUTPATIENT
Start: 2024-03-27 | End: 2024-04-04 | Stop reason: HOSPADM

## 2024-03-27 RX ORDER — OXYCODONE HYDROCHLORIDE 5 MG/1
5 TABLET ORAL
Status: DISCONTINUED | OUTPATIENT
Start: 2024-03-27 | End: 2024-03-27 | Stop reason: HOSPADM

## 2024-03-27 RX ORDER — ARFORMOTEROL TARTRATE 15 UG/2ML
15 SOLUTION RESPIRATORY (INHALATION)
Status: DISCONTINUED | OUTPATIENT
Start: 2024-03-27 | End: 2024-04-04 | Stop reason: HOSPADM

## 2024-03-27 RX ORDER — ONDANSETRON 2 MG/ML
4 INJECTION INTRAMUSCULAR; INTRAVENOUS ONCE AS NEEDED
Status: DISCONTINUED | OUTPATIENT
Start: 2024-03-27 | End: 2024-03-27 | Stop reason: HOSPADM

## 2024-03-27 RX ORDER — SODIUM CHLORIDE, SODIUM LACTATE, POTASSIUM CHLORIDE, CALCIUM CHLORIDE 600; 310; 30; 20 MG/100ML; MG/100ML; MG/100ML; MG/100ML
100 INJECTION, SOLUTION INTRAVENOUS CONTINUOUS
Status: DISCONTINUED | OUTPATIENT
Start: 2024-03-27 | End: 2024-03-28

## 2024-03-27 RX ORDER — FERROUS SULFATE 325(65) MG
325 TABLET ORAL
Status: DISCONTINUED | OUTPATIENT
Start: 2024-03-28 | End: 2024-04-02

## 2024-03-27 RX ORDER — FAMOTIDINE 20 MG/1
40 TABLET, FILM COATED ORAL DAILY
Status: DISCONTINUED | OUTPATIENT
Start: 2024-03-27 | End: 2024-03-28

## 2024-03-27 RX ORDER — PROMETHAZINE HYDROCHLORIDE 12.5 MG/1
25 TABLET ORAL ONCE AS NEEDED
Status: DISCONTINUED | OUTPATIENT
Start: 2024-03-27 | End: 2024-03-27 | Stop reason: HOSPADM

## 2024-03-27 RX ORDER — BUDESONIDE 0.5 MG/2ML
0.5 INHALANT ORAL
Status: DISCONTINUED | OUTPATIENT
Start: 2024-03-27 | End: 2024-04-04 | Stop reason: HOSPADM

## 2024-03-27 RX ORDER — ONDANSETRON 2 MG/ML
4 INJECTION INTRAMUSCULAR; INTRAVENOUS EVERY 6 HOURS PRN
Status: DISCONTINUED | OUTPATIENT
Start: 2024-03-27 | End: 2024-03-28 | Stop reason: SDUPTHER

## 2024-03-27 RX ORDER — PROMETHAZINE HYDROCHLORIDE 12.5 MG/1
12.5 SUPPOSITORY RECTAL EVERY 6 HOURS PRN
Status: DISCONTINUED | OUTPATIENT
Start: 2024-03-27 | End: 2024-04-04 | Stop reason: HOSPADM

## 2024-03-27 RX ORDER — PHENYLEPHRINE HCL IN 0.9% NACL 1 MG/10 ML
SYRINGE (ML) INTRAVENOUS AS NEEDED
Status: DISCONTINUED | OUTPATIENT
Start: 2024-03-27 | End: 2024-03-27 | Stop reason: SURG

## 2024-03-27 RX ORDER — MEPERIDINE HYDROCHLORIDE 25 MG/ML
12.5 INJECTION INTRAMUSCULAR; INTRAVENOUS; SUBCUTANEOUS
Status: DISCONTINUED | OUTPATIENT
Start: 2024-03-27 | End: 2024-03-27 | Stop reason: HOSPADM

## 2024-03-27 RX ORDER — FENTANYL CITRATE 50 UG/ML
INJECTION, SOLUTION INTRAMUSCULAR; INTRAVENOUS AS NEEDED
Status: DISCONTINUED | OUTPATIENT
Start: 2024-03-27 | End: 2024-03-27 | Stop reason: SURG

## 2024-03-27 RX ORDER — NICOTINE 21 MG/24HR
1 PATCH, TRANSDERMAL 24 HOURS TRANSDERMAL
Status: DISCONTINUED | OUTPATIENT
Start: 2024-03-27 | End: 2024-04-04 | Stop reason: HOSPADM

## 2024-03-27 RX ORDER — PROMETHAZINE HYDROCHLORIDE 25 MG/1
25 SUPPOSITORY RECTAL ONCE AS NEEDED
Status: DISCONTINUED | OUTPATIENT
Start: 2024-03-27 | End: 2024-03-27 | Stop reason: HOSPADM

## 2024-03-27 RX ORDER — HYDROCODONE BITARTRATE AND ACETAMINOPHEN 10; 325 MG/1; MG/1
1 TABLET ORAL EVERY 6 HOURS PRN
Status: DISCONTINUED | OUTPATIENT
Start: 2024-03-27 | End: 2024-03-27

## 2024-03-27 RX ORDER — LIDOCAINE HYDROCHLORIDE 20 MG/ML
INJECTION, SOLUTION EPIDURAL; INFILTRATION; INTRACAUDAL; PERINEURAL AS NEEDED
Status: DISCONTINUED | OUTPATIENT
Start: 2024-03-27 | End: 2024-03-27 | Stop reason: SURG

## 2024-03-27 RX ORDER — ONDANSETRON 2 MG/ML
4 INJECTION INTRAMUSCULAR; INTRAVENOUS EVERY 6 HOURS PRN
Status: DISCONTINUED | OUTPATIENT
Start: 2024-03-27 | End: 2024-04-04 | Stop reason: HOSPADM

## 2024-03-27 RX ORDER — SODIUM CHLORIDE, SODIUM LACTATE, POTASSIUM CHLORIDE, CALCIUM CHLORIDE 600; 310; 30; 20 MG/100ML; MG/100ML; MG/100ML; MG/100ML
INJECTION, SOLUTION INTRAVENOUS CONTINUOUS PRN
Status: DISCONTINUED | OUTPATIENT
Start: 2024-03-27 | End: 2024-03-27 | Stop reason: SURG

## 2024-03-27 RX ORDER — SODIUM CHLORIDE 9 MG/ML
40 INJECTION, SOLUTION INTRAVENOUS AS NEEDED
Status: DISCONTINUED | OUTPATIENT
Start: 2024-03-27 | End: 2024-04-04 | Stop reason: HOSPADM

## 2024-03-27 RX ORDER — ACETAMINOPHEN 325 MG/1
650 TABLET ORAL EVERY 4 HOURS PRN
Status: DISCONTINUED | OUTPATIENT
Start: 2024-03-27 | End: 2024-04-04 | Stop reason: HOSPADM

## 2024-03-27 RX ORDER — KETOROLAC TROMETHAMINE 15 MG/ML
15 INJECTION, SOLUTION INTRAMUSCULAR; INTRAVENOUS EVERY 6 HOURS SCHEDULED
Status: DISCONTINUED | OUTPATIENT
Start: 2024-03-27 | End: 2024-03-28

## 2024-03-27 RX ORDER — TRAMADOL HYDROCHLORIDE 50 MG/1
25 TABLET ORAL EVERY 8 HOURS PRN
Status: DISCONTINUED | OUTPATIENT
Start: 2024-03-27 | End: 2024-03-27

## 2024-03-27 RX ORDER — PROPOFOL 10 MG/ML
INJECTION, EMULSION INTRAVENOUS AS NEEDED
Status: DISCONTINUED | OUTPATIENT
Start: 2024-03-27 | End: 2024-03-27 | Stop reason: SURG

## 2024-03-27 RX ADMIN — IPRATROPIUM BROMIDE AND ALBUTEROL SULFATE 3 ML: .5; 3 SOLUTION RESPIRATORY (INHALATION) at 19:27

## 2024-03-27 RX ADMIN — ARFORMOTEROL TARTRATE 15 MCG: 15 SOLUTION RESPIRATORY (INHALATION) at 08:39

## 2024-03-27 RX ADMIN — AMLODIPINE BESYLATE 10 MG: 5 TABLET ORAL at 08:27

## 2024-03-27 RX ADMIN — Medication 100 MCG: at 13:56

## 2024-03-27 RX ADMIN — SODIUM CHLORIDE, POTASSIUM CHLORIDE, SODIUM LACTATE AND CALCIUM CHLORIDE 75 ML/HR: 600; 310; 30; 20 INJECTION, SOLUTION INTRAVENOUS at 07:53

## 2024-03-27 RX ADMIN — EPHEDRINE SULFATE 25 MG: 50 INJECTION INTRAVENOUS at 15:45

## 2024-03-27 RX ADMIN — Medication 10 ML: at 21:42

## 2024-03-27 RX ADMIN — LIDOCAINE HYDROCHLORIDE 100 MG: 20 INJECTION, SOLUTION EPIDURAL; INFILTRATION; INTRACAUDAL; PERINEURAL at 13:08

## 2024-03-27 RX ADMIN — FENTANYL CITRATE 50 MCG: 50 INJECTION, SOLUTION INTRAMUSCULAR; INTRAVENOUS at 13:08

## 2024-03-27 RX ADMIN — Medication 2000 MG: at 13:09

## 2024-03-27 RX ADMIN — PROPOFOL 100 MG: 10 INJECTION, EMULSION INTRAVENOUS at 13:08

## 2024-03-27 RX ADMIN — IPRATROPIUM BROMIDE AND ALBUTEROL SULFATE 3 ML: .5; 3 SOLUTION RESPIRATORY (INHALATION) at 08:29

## 2024-03-27 RX ADMIN — KETOROLAC TROMETHAMINE 15 MG: 15 INJECTION, SOLUTION INTRAMUSCULAR; INTRAVENOUS at 10:35

## 2024-03-27 RX ADMIN — KETOROLAC TROMETHAMINE 15 MG: 15 INJECTION, SOLUTION INTRAMUSCULAR; INTRAVENOUS at 18:26

## 2024-03-27 RX ADMIN — SODIUM CHLORIDE, POTASSIUM CHLORIDE, SODIUM LACTATE AND CALCIUM CHLORIDE 100 ML/HR: 600; 310; 30; 20 INJECTION, SOLUTION INTRAVENOUS at 16:25

## 2024-03-27 RX ADMIN — BUDESONIDE 0.5 MG: 0.5 INHALANT ORAL at 08:30

## 2024-03-27 RX ADMIN — FENTANYL CITRATE 50 MCG: 50 INJECTION, SOLUTION INTRAMUSCULAR; INTRAVENOUS at 14:41

## 2024-03-27 RX ADMIN — ONDANSETRON 4 MG: 2 INJECTION INTRAMUSCULAR; INTRAVENOUS at 09:27

## 2024-03-27 RX ADMIN — ROCURONIUM BROMIDE 50 MG: 10 INJECTION, SOLUTION INTRAVENOUS at 13:08

## 2024-03-27 RX ADMIN — SODIUM CHLORIDE, POTASSIUM CHLORIDE, SODIUM LACTATE AND CALCIUM CHLORIDE: 600; 310; 30; 20 INJECTION, SOLUTION INTRAVENOUS at 13:45

## 2024-03-27 RX ADMIN — ROCURONIUM BROMIDE 50 MG: 10 INJECTION, SOLUTION INTRAVENOUS at 13:46

## 2024-03-27 RX ADMIN — SODIUM CHLORIDE, POTASSIUM CHLORIDE, SODIUM LACTATE AND CALCIUM CHLORIDE: 600; 310; 30; 20 INJECTION, SOLUTION INTRAVENOUS at 13:04

## 2024-03-27 RX ADMIN — NICOTINE 1 PATCH: 21 PATCH, EXTENDED RELEASE TRANSDERMAL at 18:26

## 2024-03-27 RX ADMIN — Medication 200 MCG: at 13:59

## 2024-03-27 RX ADMIN — SODIUM CHLORIDE 2000 MG: 900 INJECTION INTRAVENOUS at 21:42

## 2024-03-27 RX ADMIN — ARFORMOTEROL TARTRATE 15 MCG: 15 SOLUTION RESPIRATORY (INHALATION) at 19:27

## 2024-03-27 RX ADMIN — BUDESONIDE 0.5 MG: 0.5 INHALANT ORAL at 19:26

## 2024-03-27 RX ADMIN — SUGAMMADEX 200 MG: 100 INJECTION, SOLUTION INTRAVENOUS at 14:39

## 2024-03-27 NOTE — ANESTHESIA POSTPROCEDURE EVALUATION
Patient: Clark Layton    Procedure Summary       Date: 03/27/24 Room / Location: [facility] OR 01 / [facility] MAIN OR    Anesthesia Start: 1304 Anesthesia Stop: 1445    Procedure: HIP BIPOLAR ANTERIOR (Left: Hip) Diagnosis:       Left displaced femoral neck fracture      (Left displaced femoral neck fracture [S72.002A])    Surgeons: Bethany Carlson MD Provider: Luis Alfredo Broussard MD    Anesthesia Type: general ASA Status: 3            Anesthesia Type: general    Vitals  Vitals Value Taken Time   /50 03/27/24 1557   Temp 36.3 °C (97.3 °F) 03/27/24 1450   Pulse 76 03/27/24 1608   Resp 16 03/27/24 1550   SpO2 93 % 03/27/24 1557   Vitals shown include unfiled device data.        Post Anesthesia Care and Evaluation    Patient location during evaluation: bedside  Patient participation: complete - patient participated  Level of consciousness: awake  Pain management: adequate    Airway patency: patent  PONV Status: none  Cardiovascular status: acceptable and stable  Respiratory status: acceptable  Hydration status: acceptable    Comments: An Anesthesiologist personally participated in the most demanding procedures (including induction and emergence if applicable) in the anesthesia plan, monitored the course of anesthesia administration at frequent intervals and remained physically present and available for immediate diagnosis and treatment of emergencies.

## 2024-03-27 NOTE — H&P
[facility]   HISTORY AND PHYSICAL    Patient Name: Clark Layton  : 1940  MRN: [id number]  Primary Care Physician:  Honorio Field MD  Date of admission: 3/26/2024    Subjective   Subjective     Chief Complaint: Hip fracture    HPI:    Clark Layton is a 83 y.o. male with past medical history of hypertension, Alzheimer's hyperlipidemia, BPH, nicotine dependence, and GERD presented to the ED after a fall resulting in left hip injury.  Patient has advanced dementia so history was given by wife at bedside.  As per wife he was sitting on a stool on the kitchen island when he fell asleep and fell to the floor.  Patient with severe pain afterwards and was then transferred to the ED for further evaluation.  In the ED patient was hypoxic on arrival with remaining vitals being within normal limits.  Labs show that he had mild leukocytosis with remaining labs being relatively unremarkable.  X-ray of the hip showed a left femoral neck fracture with CT confirming nondisplaced left femoral neck fracture.  Orthopedic surgery was consulted and agreed to see the patient in the morning.  Patient admitted for further evaluation and treatment    Review of Systems   Patient with dementia    Personal History     Past Medical History:   Diagnosis Date    Essential hypertension     Gout     High cholesterol 2018    Medication management 2018    Nicotine dependence, cigarettes, with other nicotine-induced disorders 2018       History reviewed. No pertinent surgical history.    Family History: family history includes Brain cancer in his brother; Heart disease in his father. Otherwise pertinent FHx was reviewed and not pertinent to current issue.    Social History:  reports that he has been smoking cigarettes. He started smoking about 62 years ago. He has a 62.2 pack-year smoking history. He has never used smokeless tobacco. He reports that he does not currently use alcohol after a past usage of about  7.0 standard drinks of alcohol per week. He reports that he does not use drugs.    Home Medications:  allopurinol, amLODIPine, hydroCHLOROthiazide, and tamsulosin      Allergies:  No Known Allergies    Objective   Objective     Vitals:   Temp:  [97.9 °F (36.6 °C)-100.4 °F (38 °C)] 100.4 °F (38 °C)  Heart Rate:  [75-95] 95  Resp:  [16] 16  BP: (127-157)/(55-71) 157/71  Flow (L/min):  [6] 6  Physical Exam    Constitutional: Awake, alert, oriented to self   Eyes: PERRLA, sclerae anicteric, no conjunctival injection   HENT: NCAT, mucous membranes moist   Neck: Supple, no thyromegaly, no lymphadenopathy, trachea midline   Respiratory: Clear to auscultation bilaterally, nonlabored respirations    Cardiovascular: RRR, no murmurs, rubs, or gallops, palpable pedal pulses bilaterally   Gastrointestinal: Abdomen hard, positive bowel sounds, soft, nontender, nondistended   Musculoskeletal: No bilateral ankle edema, limited range of motion secondary to pain, no clubbing or cyanosis to extremities   Psychiatric: Normal affect   Neurologic: Cranial Nerves grossly intact to confrontation, speech clear   Skin: No rashes     Result Review    Result Review:  I have personally reviewed the results from the time of this admission to 3/26/2024 21:05 EDT and agree with these findings:  [x]  Laboratory list / accordion  []  Microbiology  [x]  Radiology  [x]  EKG/Telemetry   []  Cardiology/Vascular   []  Pathology  []  Old records  []  Other:  Most notable findings include: Nondisplaced left femoral neck fracture, x-ray with no acute findings, leukocytosis      Assessment & Plan   Assessment / Plan     Brief Patient Summary:  Clark Layton is a 83 y.o. male with past medical history of hypertension, Alzheimer's hyperlipidemia, BPH, nicotine dependence, and GERD presented to the ED after a fall resulting in left hip injury    Active Hospital Problems:  Active Hospital Problems    Diagnosis     **Hip fracture     History of dementia     Gout      Essential hypertension     Nicotine dependence, cigarettes, with other nicotine-induced disorders      Plan:     Left Hip Fracture  -Admit to medical floor  -Imaging reviewed  -Pain Meds PRN  -Orthopedic Surgery Consulted  -NPO after midnight  -IVF  -PT/OT  -CBC, CMP, INR, EKG  -Supportive care    HTN  -Currently well controlled  -PRN BP meds  -Resume home meds when available  -Titrate if needed    Hypoxia  -Patient with likely undiagnosed COPD  -DuoNebs  -Incentive spirometry  -Supplemental oxygen as needed  -Supportive care    Dementia    GI ppx  DVT ppx        CODE STATUS: Full code     Admission Status:  I believe this patient meets inpatient status.      Electronically signed by Bryan Khan MD, 03/26/24, 9:05 PM EDT.

## 2024-03-27 NOTE — PLAN OF CARE
Goal Outcome Evaluation:  Pt complained of pain once during the shift, which was managed with IV PRN medication. VSS. Pt is on bedrest. Pt is scheduled for surgery today. Pt is tolerating the francois well. Chris Eugene RN

## 2024-03-27 NOTE — OP NOTE
HIP BIPOLAR ANTERIOR  Procedure Report    Patient Name:  Clark Layton  YOB: 1940    Date of Surgery:  3/27/2024     Indications:  injury-->femoral neck fracture    Pre-op Diagnosis:   Left displaced femoral neck fracture [S72.002A]       Post-Op Diagnosis Codes:     * Left displaced femoral neck fracture [S72.002A]    Procedure/CPT® Codes:      Procedure(s):  HIP BIPOLAR ANTERIOR LEFT    Staff:  Surgeon(s):  Bethany Carlson MD    Assistant: Katie Castrejon    Anesthesia: General    Estimated Blood Loss: 200ml    Implants:    Implant Name Type Inv. Item Serial No.  Lot No. LRB No. Used Action   CMT BONE PALACOS R HI/VISC 1X40 - [id number] Implant CMT BONE PALACOS R HI/VISC 1X40  [facility] [id number] Left 2 Implanted   KT PREP/CMT FEM QUICKUSE 2BONEPLUG PE - [id number] Implant KT PREP/CMT FEM QUICKUSE 2BONEPLUG PE  [employer] [id number] Left 1 Implanted   STEM FEM/HIP AVENIR CMT STD SZ3 - [id number] Implant STEM FEM/HIP AVENIR CMT STD SZ3  [employer] [id number] Left 1 Implanted   CUP ACET RINGLOC BIPOL 28MM 46MM - [id number] Implant CUP ACET RINGLOC BIPOL 28MM 46MM  [employer] [id number] Left 1 Implanted   HD FEM/HIP UNIV COCR 12/14TPR 28MM MIN3.5 - [id number] Implant HD FEM/HIP UNIV COCR 12/14TPR 28MM MIN3.5  [employer] [id number] Left 1 Implanted       Specimen:          None        Findings: + femoral neck fracture    Complications: None    Description of Procedure: The patient was brought to the operating room and underwent anesthesia. The patient was placed on the [place] table and was then prepped and draped in a sterile fashion. An anterior hip incision was then made, interval was found, and retractors placed. Aquamantys was used for hemostasis. The capsule was then opened and excised. The femoral neck fracture was identified, then the neck cut was freshened, and bone was removed. The head was then removed from the acetabulum and measured, and this size gave good fit and fill  of the acetabulum. The bed was raised. The leg was dropped. Femoral exposure was obtained. This was then opened using a rat-tail reamer and then sequentially broached up to an appropriate stem. The stem was then inserted with cement, this was reduced, and the appropriate head gave good leg length, range of motion and stability. This was impacted into place, reduced, and thoroughly irrigated. C-arm fluoroscopy was used throughout the case. This was then irrigated and closed using #1 Vicryl, 2-0 Vicryl and staples. A sterile dressing was applied. The patient was then taken to the recovery room in stable condition. No complications.    Assistant: Katie Castrejon  was responsible for performing the following activities: Retraction, Suction, Irrigation, Closing, and Placing Dressing and their skilled assistance was necessary for the success of this case.    Bethany Carlson MD     Date: 3/27/2024  Time: 14:50 EDT

## 2024-03-27 NOTE — CONSULTS
[facility]   Consult Note    Patient Name: Clark Layton  : 1940  MRN: [id number]  Primary Care Physician:  Honorio Field MD  Referring Physician: No ref. provider found  Date of admission: 3/26/2024    Inpatient Orthopedic Surgery Consult  Consult performed by: Bethany Carlson MD  Consult ordered by: Wilfred Hamlin DO        Subjective   Subjective     Reason for Consult/ Chief Complaint: Left hip fracture    History of Present Illness  Clark Layton is a 83 y.o. male who was sitting on a stool on the kitchen island when he fell asleep and subsequently fell to the floor.  Had onset of severe pain in his left hip.  He was brought to the emergency room where x-rays revealed a femoral neck fracture.  Does have a history of hypertension Alzheimer's GERD and BPH.  He is admitted for surgical intervention.  Relatively independent at home does not use any devices to ambulate.    Review of Systems     Personal History     Past Medical History:   Diagnosis Date    Essential hypertension     Gout     High cholesterol 2018    Medication management 2018    Nicotine dependence, cigarettes, with other nicotine-induced disorders 2018       History reviewed. No pertinent surgical history.    Family History: His family history includes Brain cancer in his brother; Heart disease in his father.     Social History: He  reports that he has been smoking cigarettes. He started smoking about 62 years ago. He has a 62.2 pack-year smoking history. He has never used smokeless tobacco. He reports that he does not currently use alcohol after a past usage of about 7.0 standard drinks of alcohol per week. He reports that he does not use drugs.    Home Medications:  allopurinol, amLODIPine, hydroCHLOROthiazide, and tamsulosin    Allergies:  He has No Known Allergies.    Objective    Objective     Vitals:    Temp:  [97.9 °F (36.6 °C)-100.4 °F (38 °C)] 97.9 °F (36.6 °C)  Heart Rate:  [75-95] 87  Resp:  [16]  16  BP: (127-157)/(55-77) 152/77  Flow (L/min):  [2-6] 4  Output by Drain (mL) 03/26/24 0701 - 03/26/24 1900 03/26/24 1901 - 03/27/24 0646 Range Total   Requested LDAs do not have output data documented.       Physical Exam  Awake and alert.  Wife at bedside.  Pain in left hip.  Leg is shortened and rotated.  Able to wiggle his toes.  Denies any upper extremity pain no knee or ankle pain  Result Review    Result Review:  I have personally reviewed the results from the time of this admission to 3/27/2024 06:46 EDT and agree with these findings:  [x]  Laboratory list / accordion  []  Microbiology  [x]  Radiology  []  EKG/Telemetry   []  Cardiology/Vascular   []  Pathology  []  Old records  []  Other:  Most notable findings include: Left femoral neck fracture      Assessment & Plan   Assessment / Plan     Brief Patient Summary:  Clark Layton is a 83 y.o. male status post fall with resultant left hip pain and femoral neck fracture    Active Hospital Problems:  Active Hospital Problems    Diagnosis     **Hip fracture     History of dementia     Gout     Essential hypertension     Nicotine dependence, cigarettes, with other nicotine-induced disorders        Plan:   Risk and benefits of left hip hemiarthroplasty were explained to the patient and his wife.  They understand and wish to proceed.  NPO.    Bethany Carlson MD

## 2024-03-27 NOTE — PROGRESS NOTES
[facility]   Hospitalist Progress Note  Date: 3/27/2024  Patient Name: Clark Layton  : 1940  MRN: [id number]  Date of admission: 3/26/2024      Subjective   Subjective     Chief Complaint: Fall at home and left hip pain.    Summary:   Clark Layton is a 83 y.o. male with past medical history of hypertension, Alzheimer's hyperlipidemia, BPH, nicotine dependence, and GERD presented to the ED after a fall resulting in left hip injury.  Patient has advanced dementia so history was given by wife at bedside.  As per wife he was sitting on a stool on the kitchen island when he fell asleep and fell to the floor.  Patient with severe pain afterwards and was then transferred to the ED for further evaluation.  In the ED patient was hypoxic on arrival with remaining vitals being within normal limits.  Labs show that he had mild leukocytosis with remaining labs being relatively unremarkable.  X-ray of the hip showed a left femoral neck fracture with CT confirming nondisplaced left femoral neck fracture.  Orthopedic surgery was consulted and agreed to see the patient in the morning.  Patient admitted for further evaluation and treatment     Interval Followup:   Patient is seen postop and is very sleepy.  Wife at bedside.  Drop O2 sat to 84% room air.  Now on 6 L with 95% saturation.  No home oxygen use.  No prior shortness of air, cough, chest pain or fever noticed as per wife.  Does smoke cigarettes a pack per day per wife.    Review of Systems   All systems were reviewed and negative except for: Limited as patient sleepy.    Objective   Objective     Vitals:   Temp:  [97.3 °F (36.3 °C)-100.4 °F (38 °C)] 98.1 °F (36.7 °C)  Heart Rate:  [] 66  Resp:  [12-24] 18  BP: ()/(39-77) 112/41  Flow (L/min):  [2-6] 6  Physical Exam    Constitutional: Sleepy, no acute distress   Eyes: Pupils equal, sclerae anicteric, no conjunctival injection   HENT: NCAT, mucous membranes dry   Neck: Supple, no thyromegaly, no  lymphadenopathy, trachea midline   Respiratory: Clear to auscultation bilaterally, nonlabored respirations    Cardiovascular: RRR, no murmurs, rubs, or gallops, palpable pedal pulses bilaterally   Gastrointestinal: Positive bowel sounds, soft, nontender, nondistended   Musculoskeletal: Left hip incision dressed.  Left lower extremity rotated externally.  Restricted range of motion.  No bilateral ankle edema, no clubbing or cyanosis to extremities   Psychiatric: Patient sleepy.  Appropriate affect, cooperative   Neurologic: Could not be obtained as patient sleepy oriented x 3,    Skin: No rashes     Result Review    Result Review:  I have personally reviewed the results for the past 24 hours and agree with these findings:  [x]  Laboratory  []  Microbiology  [x]  Radiology  [x]  EKG/Telemetry normal sinus rhythm rate of 81.  QT interval 0.44  []  Cardiology/Vascular   []  Pathology  [x]  Old records  [x]  Other: Medications    Assessment & Plan   Assessment / Plan     Assessment:  Fall at home from sitting position with left hip pain.  Left displaced femoral neck fracture.  S/p left hip bipolar anterior arthroplasty on March 27.  Hypoxia.  No home oxygen use  Possibly undiagnosed COPD  Tobacco use disorder.  Hypertension not soft blood pressure.  Gout.  Hyperlipidemia.  Alzheimer's dementia.  BPH.  Hematuria due to Crowder catheter.  Leukocytosis.  Likely reactive.          Plan:  Continue IV fluid for today.  Stat chest x-ray.  Chest x-ray from ED reviewed no acute finding  Stat BNP and D-dimer.  Oral and IV narcotics along with Toradol for pain control on as-needed basis.  Nebulizer treatment.  Bronchodilator and bronchopulmonary hygiene protocol.  Incentive spirometry and flutter valve.  Hold home Norvasc and diuretic  Continue home Flomax and allopurinol.  Appreciate orthopedic input.  Nicotine patch.  Bowel regimen.  Continue Crowdre catheter  PT OT  Continue telemetry for now  Per wife patient is DNR.  Will adjust  CODE STATUS  Discussed plan with RN and .  Rehab placement    DVT prophylaxis:  Medical and mechanical DVT prophylaxis orders are present.        CODE STATUS:   Code Status (Patient has no pulse and is not breathing): CPR (Attempt to Resuscitate)  Medical Interventions (Patient has pulse or is breathing): Full Support      Part of this note may be an electronic transcription/translation of spoken language to printed text using the Dragon Dictation System.     Electronically signed by Murtaza Tanner MD, 03/27/24, 5:24 PM EDT.

## 2024-03-27 NOTE — SIGNIFICANT NOTE
03/27/24 0700   Physical Therapy Time and Intention   Session Not Performed other (see comments)  (surgery today)

## 2024-03-27 NOTE — SIGNIFICANT NOTE
03/27/24 0608   OTHER   Discipline occupational therapist   Rehab Time/Intention   Session Not Performed   (scheduled for ortho surgery today. will evaluate post-op.)

## 2024-03-28 ENCOUNTER — APPOINTMENT (OUTPATIENT)
Dept: CT IMAGING | Facility: HOSPITAL | Age: 84
End: 2024-03-28
Payer: MEDICARE

## 2024-03-28 ENCOUNTER — APPOINTMENT (OUTPATIENT)
Dept: CARDIOLOGY | Facility: HOSPITAL | Age: 84
End: 2024-03-28
Payer: MEDICARE

## 2024-03-28 ENCOUNTER — APPOINTMENT (OUTPATIENT)
Dept: NUCLEAR MEDICINE | Facility: HOSPITAL | Age: 84
End: 2024-03-28
Payer: MEDICARE

## 2024-03-28 ENCOUNTER — APPOINTMENT (OUTPATIENT)
Dept: ULTRASOUND IMAGING | Facility: HOSPITAL | Age: 84
End: 2024-03-28
Payer: MEDICARE

## 2024-03-28 LAB
ALBUMIN SERPL-MCNC: 3 G/DL (ref 3.5–5.2)
ANION GAP SERPL CALCULATED.3IONS-SCNC: 8.1 MMOL/L (ref 5–15)
ARTERIAL PATENCY WRIST A: POSITIVE
BACTERIA UR QL AUTO: ABNORMAL /HPF
BASE EXCESS BLDA CALC-SCNC: 1 MMOL/L (ref -2–2)
BASOPHILS # BLD AUTO: 0.08 10*3/MM3 (ref 0–0.2)
BASOPHILS NFR BLD AUTO: 0.6 % (ref 0–1.5)
BDY SITE: ABNORMAL
BH CV ECHO MEAS - AO MAX PG: 44.2 MMHG
BH CV ECHO MEAS - AO MEAN PG: 19 MMHG
BH CV ECHO MEAS - AO ROOT DIAM: 3.2 CM
BH CV ECHO MEAS - AO V2 MAX: 332.6 CM/SEC
BH CV ECHO MEAS - AO V2 VTI: 59.3 CM
BH CV ECHO MEAS - AVA(I,D): 1.31 CM2
BH CV ECHO MEAS - EDV(CUBED): 73 ML
BH CV ECHO MEAS - EDV(MOD-SP2): 70.1 ML
BH CV ECHO MEAS - EDV(MOD-SP4): 79.3 ML
BH CV ECHO MEAS - EF(MOD-BP): 73 %
BH CV ECHO MEAS - EF(MOD-SP2): 68.6 %
BH CV ECHO MEAS - EF(MOD-SP4): 78.1 %
BH CV ECHO MEAS - ESV(CUBED): 7 ML
BH CV ECHO MEAS - ESV(MOD-SP2): 22 ML
BH CV ECHO MEAS - ESV(MOD-SP4): 17.4 ML
BH CV ECHO MEAS - FS: 54.1 %
BH CV ECHO MEAS - IVS/LVPW: 1.08 CM
BH CV ECHO MEAS - IVSD: 1.35 CM
BH CV ECHO MEAS - LA DIMENSION: 3.6 CM
BH CV ECHO MEAS - LAT PEAK E' VEL: 10.1 CM/SEC
BH CV ECHO MEAS - LV MASS(C)D: 198.9 GRAMS
BH CV ECHO MEAS - LV MAX PG: 6.7 MMHG
BH CV ECHO MEAS - LV MEAN PG: 3.3 MMHG
BH CV ECHO MEAS - LV V1 MAX: 129.8 CM/SEC
BH CV ECHO MEAS - LV V1 VTI: 24 CM
BH CV ECHO MEAS - LVIDD: 4.2 CM
BH CV ECHO MEAS - LVIDS: 1.92 CM
BH CV ECHO MEAS - LVOT AREA: 3.2 CM2
BH CV ECHO MEAS - LVOT DIAM: 2.03 CM
BH CV ECHO MEAS - LVPWD: 1.25 CM
BH CV ECHO MEAS - MED PEAK E' VEL: 11.4 CM/SEC
BH CV ECHO MEAS - MV A MAX VEL: 110.7 CM/SEC
BH CV ECHO MEAS - MV DEC SLOPE: 521.4 CM/SEC2
BH CV ECHO MEAS - MV DEC TIME: 0.22 SEC
BH CV ECHO MEAS - MV E MAX VEL: 100 CM/SEC
BH CV ECHO MEAS - MV E/A: 0.9
BH CV ECHO MEAS - MV MAX PG: 7 MMHG
BH CV ECHO MEAS - MV MEAN PG: 2.5 MMHG
BH CV ECHO MEAS - MV P1/2T: 64.7 MSEC
BH CV ECHO MEAS - MV V2 VTI: 33.5 CM
BH CV ECHO MEAS - MVA(P1/2T): 3.4 CM2
BH CV ECHO MEAS - MVA(VTI): 2.32 CM2
BH CV ECHO MEAS - RVDD: 2.8 CM
BH CV ECHO MEAS - SV(LVOT): 77.7 ML
BH CV ECHO MEAS - SV(MOD-SP2): 48.1 ML
BH CV ECHO MEAS - SV(MOD-SP4): 61.9 ML
BH CV ECHO MEASUREMENTS AVERAGE E/E' RATIO: 9.3
BILIRUB UR QL STRIP: NEGATIVE
BUN SERPL-MCNC: 35 MG/DL (ref 8–23)
BUN/CREAT SERPL: 17.3 (ref 7–25)
CALCIUM SPEC-SCNC: 8.1 MG/DL (ref 8.6–10.5)
CHLORIDE SERPL-SCNC: 100 MMOL/L (ref 98–107)
CK SERPL-CCNC: 379 U/L (ref 20–200)
CLARITY UR: ABNORMAL
CO2 SERPL-SCNC: 28.9 MMOL/L (ref 22–29)
COHGB MFR BLD: 0.8 % (ref 0–1.5)
COLOR UR: ABNORMAL
CREAT SERPL-MCNC: 2.02 MG/DL (ref 0.76–1.27)
DEPRECATED RDW RBC AUTO: 53.3 FL (ref 37–54)
EGFRCR SERPLBLD CKD-EPI 2021: 32.5 ML/MIN/1.73
EOSINOPHIL # BLD AUTO: 0.21 10*3/MM3 (ref 0–0.4)
EOSINOPHIL NFR BLD AUTO: 1.5 % (ref 0.3–6.2)
ERYTHROCYTE [DISTWIDTH] IN BLOOD BY AUTOMATED COUNT: 14.6 % (ref 12.3–15.4)
FHHB: 5.4 % (ref 0–5)
GAS FLOW AIRWAY: 7 LPM
GLUCOSE SERPL-MCNC: 115 MG/DL (ref 65–99)
GLUCOSE UR STRIP-MCNC: NEGATIVE MG/DL
HCO3 BLDA-SCNC: 28.1 MMOL/L (ref 22–26)
HCT VFR BLD AUTO: 35.1 % (ref 37.5–51)
HGB BLD-MCNC: 10.9 G/DL (ref 13–17.7)
HGB BLDA-MCNC: 11.7 G/DL (ref 13.8–16.4)
HGB UR QL STRIP.AUTO: ABNORMAL
HYALINE CASTS UR QL AUTO: ABNORMAL /LPF
IMM GRANULOCYTES # BLD AUTO: 0.09 10*3/MM3 (ref 0–0.05)
IMM GRANULOCYTES NFR BLD AUTO: 0.7 % (ref 0–0.5)
KETONES UR QL STRIP: NEGATIVE
LEFT ATRIUM VOLUME INDEX: 31.2 ML/M2
LEUKOCYTE ESTERASE UR QL STRIP.AUTO: ABNORMAL
LYMPHOCYTES # BLD AUTO: 1.6 10*3/MM3 (ref 0.7–3.1)
LYMPHOCYTES NFR BLD AUTO: 11.7 % (ref 19.6–45.3)
MCH RBC QN AUTO: 31.3 PG (ref 26.6–33)
MCHC RBC AUTO-ENTMCNC: 31.1 G/DL (ref 31.5–35.7)
MCV RBC AUTO: 100.9 FL (ref 79–97)
METHGB BLD QL: 0.2 % (ref 0–1.5)
MODALITY: ABNORMAL
MONOCYTES # BLD AUTO: 0.78 10*3/MM3 (ref 0.1–0.9)
MONOCYTES NFR BLD AUTO: 5.7 % (ref 5–12)
NEUTROPHILS NFR BLD AUTO: 10.91 10*3/MM3 (ref 1.7–7)
NEUTROPHILS NFR BLD AUTO: 79.8 % (ref 42.7–76)
NITRITE UR QL STRIP: NEGATIVE
NRBC BLD AUTO-RTO: 0 /100 WBC (ref 0–0.2)
OXYHGB MFR BLDV: 93.6 % (ref 94–99)
PCO2 BLDA: 56.5 MM HG (ref 35–45)
PH BLDA: 7.31 PH UNITS (ref 7.35–7.45)
PH UR STRIP.AUTO: <=5 [PH] (ref 5–8)
PHOSPHATE SERPL-MCNC: 4.8 MG/DL (ref 2.5–4.5)
PLATELET # BLD AUTO: 161 10*3/MM3 (ref 140–450)
PMV BLD AUTO: 11.3 FL (ref 6–12)
PO2 BLDA: 80.7 MM HG (ref 80–100)
POTASSIUM SERPL-SCNC: 4.7 MMOL/L (ref 3.5–5.2)
PROCALCITONIN SERPL-MCNC: 0.41 NG/ML (ref 0–0.25)
PROT UR QL STRIP: ABNORMAL
QT INTERVAL: 373 MS
QT INTERVAL: 380 MS
QTC INTERVAL: 442 MS
QTC INTERVAL: 452 MS
RBC # BLD AUTO: 3.48 10*6/MM3 (ref 4.14–5.8)
RBC # UR STRIP: ABNORMAL /HPF
REF LAB TEST METHOD: ABNORMAL
SAO2 % BLDCOA: 94.5 % (ref 95–99)
SODIUM SERPL-SCNC: 137 MMOL/L (ref 136–145)
SODIUM UR-SCNC: 34 MMOL/L
SP GR UR STRIP: 1.03 (ref 1–1.03)
SQUAMOUS #/AREA URNS HPF: ABNORMAL /HPF
TRANS CELLS #/AREA URNS HPF: ABNORMAL /HPF
UROBILINOGEN UR QL STRIP: ABNORMAL
WBC # UR STRIP: ABNORMAL /HPF
WBC NRBC COR # BLD AUTO: 13.67 10*3/MM3 (ref 3.4–10.8)

## 2024-03-28 PROCEDURE — 99233 SBSQ HOSP IP/OBS HIGH 50: CPT | Performed by: INTERNAL MEDICINE

## 2024-03-28 PROCEDURE — 93306 TTE W/DOPPLER COMPLETE: CPT | Performed by: INTERNAL MEDICINE

## 2024-03-28 PROCEDURE — 81001 URINALYSIS AUTO W/SCOPE: CPT | Performed by: STUDENT IN AN ORGANIZED HEALTH CARE EDUCATION/TRAINING PROGRAM

## 2024-03-28 PROCEDURE — 93010 ELECTROCARDIOGRAM REPORT: CPT | Performed by: INTERNAL MEDICINE

## 2024-03-28 PROCEDURE — 25010000002 HALOPERIDOL LACTATE PER 5 MG

## 2024-03-28 PROCEDURE — 84300 ASSAY OF URINE SODIUM: CPT | Performed by: INTERNAL MEDICINE

## 2024-03-28 PROCEDURE — A9540 TC99M MAA: HCPCS | Performed by: INTERNAL MEDICINE

## 2024-03-28 PROCEDURE — 94799 UNLISTED PULMONARY SVC/PX: CPT

## 2024-03-28 PROCEDURE — 97161 PT EVAL LOW COMPLEX 20 MIN: CPT

## 2024-03-28 PROCEDURE — 99223 1ST HOSP IP/OBS HIGH 75: CPT | Performed by: STUDENT IN AN ORGANIZED HEALTH CARE EDUCATION/TRAINING PROGRAM

## 2024-03-28 PROCEDURE — 78580 LUNG PERFUSION IMAGING: CPT

## 2024-03-28 PROCEDURE — 25010000002 ENOXAPARIN PER 10 MG: Performed by: INTERNAL MEDICINE

## 2024-03-28 PROCEDURE — 83050 HGB METHEMOGLOBIN QUAN: CPT | Performed by: INTERNAL MEDICINE

## 2024-03-28 PROCEDURE — 36600 WITHDRAWAL OF ARTERIAL BLOOD: CPT | Performed by: INTERNAL MEDICINE

## 2024-03-28 PROCEDURE — 82550 ASSAY OF CK (CPK): CPT | Performed by: INTERNAL MEDICINE

## 2024-03-28 PROCEDURE — 93306 TTE W/DOPPLER COMPLETE: CPT

## 2024-03-28 PROCEDURE — 76775 US EXAM ABDO BACK WALL LIM: CPT

## 2024-03-28 PROCEDURE — 25010000002 HALOPERIDOL LACTATE PER 5 MG: Performed by: INTERNAL MEDICINE

## 2024-03-28 PROCEDURE — 94664 DEMO&/EVAL PT USE INHALER: CPT

## 2024-03-28 PROCEDURE — 25010000002 KETOROLAC TROMETHAMINE PER 15 MG: Performed by: ORTHOPAEDIC SURGERY

## 2024-03-28 PROCEDURE — 85025 COMPLETE CBC W/AUTO DIFF WBC: CPT | Performed by: ORTHOPAEDIC SURGERY

## 2024-03-28 PROCEDURE — 0 TECHNETIUM ALBUMIN AGGREGATED: Performed by: INTERNAL MEDICINE

## 2024-03-28 PROCEDURE — 25010000002 FUROSEMIDE PER 20 MG: Performed by: INTERNAL MEDICINE

## 2024-03-28 PROCEDURE — 25010000002 CEFAZOLIN SODIUM 2 G RECONSTITUTED SOLUTION: Performed by: ORTHOPAEDIC SURGERY

## 2024-03-28 PROCEDURE — 97165 OT EVAL LOW COMPLEX 30 MIN: CPT

## 2024-03-28 PROCEDURE — 93005 ELECTROCARDIOGRAM TRACING: CPT

## 2024-03-28 PROCEDURE — 71250 CT THORAX DX C-: CPT

## 2024-03-28 PROCEDURE — 94761 N-INVAS EAR/PLS OXIMETRY MLT: CPT

## 2024-03-28 PROCEDURE — 80069 RENAL FUNCTION PANEL: CPT | Performed by: ORTHOPAEDIC SURGERY

## 2024-03-28 PROCEDURE — 82375 ASSAY CARBOXYHB QUANT: CPT | Performed by: INTERNAL MEDICINE

## 2024-03-28 PROCEDURE — 84145 PROCALCITONIN (PCT): CPT | Performed by: INTERNAL MEDICINE

## 2024-03-28 PROCEDURE — 82805 BLOOD GASES W/O2 SATURATION: CPT | Performed by: INTERNAL MEDICINE

## 2024-03-28 PROCEDURE — 94660 CPAP INITIATION&MGMT: CPT

## 2024-03-28 RX ORDER — ENOXAPARIN SODIUM 100 MG/ML
30 INJECTION SUBCUTANEOUS DAILY
Status: DISCONTINUED | OUTPATIENT
Start: 2024-03-28 | End: 2024-04-04 | Stop reason: HOSPADM

## 2024-03-28 RX ORDER — HALOPERIDOL 5 MG/ML
2 INJECTION INTRAMUSCULAR EVERY 6 HOURS PRN
Status: DISCONTINUED | OUTPATIENT
Start: 2024-03-28 | End: 2024-04-04 | Stop reason: HOSPADM

## 2024-03-28 RX ORDER — FUROSEMIDE 10 MG/ML
40 INJECTION INTRAMUSCULAR; INTRAVENOUS 2 TIMES DAILY
Status: DISCONTINUED | OUTPATIENT
Start: 2024-03-28 | End: 2024-03-28

## 2024-03-28 RX ORDER — IPRATROPIUM BROMIDE AND ALBUTEROL SULFATE 2.5; .5 MG/3ML; MG/3ML
3 SOLUTION RESPIRATORY (INHALATION) EVERY 4 HOURS PRN
Status: DISCONTINUED | OUTPATIENT
Start: 2024-03-28 | End: 2024-04-04 | Stop reason: HOSPADM

## 2024-03-28 RX ORDER — HALOPERIDOL 5 MG/ML
1 INJECTION INTRAMUSCULAR ONCE
Status: COMPLETED | OUTPATIENT
Start: 2024-03-28 | End: 2024-03-28

## 2024-03-28 RX ORDER — FAMOTIDINE 20 MG/1
20 TABLET, FILM COATED ORAL DAILY
Status: DISCONTINUED | OUTPATIENT
Start: 2024-03-29 | End: 2024-04-04 | Stop reason: HOSPADM

## 2024-03-28 RX ADMIN — HALOPERIDOL LACTATE 2 MG: 5 INJECTION, SOLUTION INTRAMUSCULAR at 12:11

## 2024-03-28 RX ADMIN — ALLOPURINOL 300 MG: 300 TABLET ORAL at 09:21

## 2024-03-28 RX ADMIN — ACETAMINOPHEN 1000 MG: 500 TABLET ORAL at 09:21

## 2024-03-28 RX ADMIN — SODIUM CHLORIDE 2000 MG: 900 INJECTION INTRAVENOUS at 05:32

## 2024-03-28 RX ADMIN — TAMSULOSIN HYDROCHLORIDE 0.4 MG: 0.4 CAPSULE ORAL at 09:20

## 2024-03-28 RX ADMIN — KETOROLAC TROMETHAMINE 15 MG: 15 INJECTION, SOLUTION INTRAMUSCULAR; INTRAVENOUS at 05:32

## 2024-03-28 RX ADMIN — IPRATROPIUM BROMIDE AND ALBUTEROL SULFATE 3 ML: .5; 3 SOLUTION RESPIRATORY (INHALATION) at 07:38

## 2024-03-28 RX ADMIN — Medication 10 ML: at 09:22

## 2024-03-28 RX ADMIN — DOCUSATE SODIUM 50MG AND SENNOSIDES 8.6MG 2 TABLET: 8.6; 5 TABLET, FILM COATED ORAL at 09:20

## 2024-03-28 RX ADMIN — DOCUSATE SODIUM 50MG AND SENNOSIDES 8.6MG 2 TABLET: 8.6; 5 TABLET, FILM COATED ORAL at 20:04

## 2024-03-28 RX ADMIN — BUDESONIDE 0.5 MG: 0.5 INHALANT ORAL at 19:04

## 2024-03-28 RX ADMIN — NICOTINE 1 PATCH: 21 PATCH, EXTENDED RELEASE TRANSDERMAL at 09:24

## 2024-03-28 RX ADMIN — ARFORMOTEROL TARTRATE 15 MCG: 15 SOLUTION RESPIRATORY (INHALATION) at 19:04

## 2024-03-28 RX ADMIN — BUDESONIDE 0.5 MG: 0.5 INHALANT ORAL at 07:38

## 2024-03-28 RX ADMIN — ACETAMINOPHEN 1000 MG: 500 TABLET ORAL at 17:05

## 2024-03-28 RX ADMIN — FUROSEMIDE 40 MG: 10 INJECTION, SOLUTION INTRAMUSCULAR; INTRAVENOUS at 09:22

## 2024-03-28 RX ADMIN — ARFORMOTEROL TARTRATE 15 MCG: 15 SOLUTION RESPIRATORY (INHALATION) at 07:38

## 2024-03-28 RX ADMIN — KETOROLAC TROMETHAMINE 15 MG: 15 INJECTION, SOLUTION INTRAMUSCULAR; INTRAVENOUS at 00:18

## 2024-03-28 RX ADMIN — KIT FOR THE PREPARATION OF TECHNETIUM TC 99M ALBUMIN AGGREGATED 1 DOSE: 2.5 INJECTION, POWDER, FOR SOLUTION INTRAVENOUS at 13:05

## 2024-03-28 RX ADMIN — FAMOTIDINE 40 MG: 20 TABLET ORAL at 09:21

## 2024-03-28 RX ADMIN — HALOPERIDOL LACTATE 2 MG: 5 INJECTION, SOLUTION INTRAMUSCULAR at 22:16

## 2024-03-28 RX ADMIN — FERROUS SULFATE TAB 325 MG (65 MG ELEMENTAL FE) 325 MG: 325 (65 FE) TAB at 09:21

## 2024-03-28 RX ADMIN — ENOXAPARIN SODIUM 30 MG: 100 INJECTION SUBCUTANEOUS at 09:20

## 2024-03-28 RX ADMIN — Medication 10 ML: at 20:05

## 2024-03-28 RX ADMIN — HALOPERIDOL LACTATE 1 MG: 5 INJECTION, SOLUTION INTRAMUSCULAR at 05:43

## 2024-03-28 NOTE — PLAN OF CARE
Goal Outcome Evaluation:  Plan of Care Reviewed With: patient        Progress: no change  Outcome Evaluation: Patient presents with balance and endurance limitations that impede his/her ability to perform ADLS. The skills of a therapist are necessary to maximize independence with ADLs.      Anticipated Discharge Disposition (OT): inpatient rehabilitation facility

## 2024-03-28 NOTE — NURSING NOTE
Pt awoke more confused than baseline, agitated, pulling at lines and tubes. Pt is not complaining of pain. Oncall PA notified, one time dose haldol ordered.

## 2024-03-28 NOTE — PLAN OF CARE
Goal Outcome Evaluation:                        Asleep most of the night, responds to pain and stimuli.  No complaints of pain or nausea. Wife remains at bedside.

## 2024-03-28 NOTE — PROGRESS NOTES
Orthopedic hemiarthroplasty for fracture progress Note    Assessment/Plan PT/OT, DVT prophylaxis, rehab evaluations, pain control    Status post-left Turner hip arthroplasty: Doing well postoperatively.    Pain Relief: some relief    Continues current post-op course    Activity: up with assistance    Weight Bearing: WBAT     LOS: 0 days     Subjective     Post-Operative Day: 1 post-op me hip arthroplasty  Systemic or Specific Complaints: No Complaints    Objective     Vital signs in last 24 hours:  Vitals:    03/27/24 2236 03/27/24 2325 03/28/24 0335 03/28/24 0700   BP: 113/43 100/50 125/53 117/52   BP Location: Right arm Right arm Right arm Right arm   Patient Position: Lying Lying Lying Lying   Pulse:   70    Resp: 18 18 18 18   Temp: 97.9 °F (36.6 °C) 98 °F (36.7 °C) 97.8 °F (36.6 °C) 97.9 °F (36.6 °C)   TempSrc: Axillary Axillary Axillary Axillary   SpO2: 95% 93% 95% (!) 89%   Weight:       Height:            General: alert, appears stated age, and cooperative   Neurovascular: Tibial nerve: Intact, Superficial peroneal nerve: Intact, and Deep peroneal nerve: Intact  Capillary refill: Normal   Wound: Wound clean and dry no evidence of infection.   Range of Motion: Limited flexsion and Limited extension   DVT Exam: No evidence of DVT seen on physical exam.      WBC   Date Value Ref Range Status   03/28/2024 13.67 (H) 3.40 - 10.80 10*3/mm3 Final     RBC   Date Value Ref Range Status   03/28/2024 3.48 (L) 4.14 - 5.80 10*6/mm3 Final     Hemoglobin   Date Value Ref Range Status   03/28/2024 10.9 (L) 13.0 - 17.7 g/dL Final     Hematocrit   Date Value Ref Range Status   03/28/2024 35.1 (L) 37.5 - 51.0 % Final     MCV   Date Value Ref Range Status   03/28/2024 100.9 (H) 79.0 - 97.0 fL Final     MCH   Date Value Ref Range Status   03/28/2024 31.3 26.6 - 33.0 pg Final     MCHC   Date Value Ref Range Status   03/28/2024 31.1 (L) 31.5 - 35.7 g/dL Final     RDW   Date Value Ref Range Status   03/28/2024 14.6 12.3 - 15.4 %  "Final     RDW-SD   Date Value Ref Range Status   03/28/2024 53.3 37.0 - 54.0 fl Final     MPV   Date Value Ref Range Status   03/28/2024 11.3 6.0 - 12.0 fL Final     Platelets   Date Value Ref Range Status   03/28/2024 161 140 - 450 10*3/mm3 Final     Neutrophil %   Date Value Ref Range Status   03/28/2024 79.8 (H) 42.7 - 76.0 % Final     Lymphocyte %   Date Value Ref Range Status   03/28/2024 11.7 (L) 19.6 - 45.3 % Final     Monocyte %   Date Value Ref Range Status   03/28/2024 5.7 5.0 - 12.0 % Final     Eosinophil %   Date Value Ref Range Status   03/28/2024 1.5 0.3 - 6.2 % Final     Basophil %   Date Value Ref Range Status   03/28/2024 0.6 0.0 - 1.5 % Final     Immature Grans %   Date Value Ref Range Status   03/28/2024 0.7 (H) 0.0 - 0.5 % Final     Neutrophils, Absolute   Date Value Ref Range Status   03/28/2024 10.91 (H) 1.70 - 7.00 10*3/mm3 Final     Lymphocytes, Absolute   Date Value Ref Range Status   03/28/2024 1.60 0.70 - 3.10 10*3/mm3 Final     Monocytes, Absolute   Date Value Ref Range Status   03/28/2024 0.78 0.10 - 0.90 10*3/mm3 Final     Eosinophils, Absolute   Date Value Ref Range Status   03/28/2024 0.21 0.00 - 0.40 10*3/mm3 Final     Basophils, Absolute   Date Value Ref Range Status   03/28/2024 0.08 0.00 - 0.20 10*3/mm3 Final     Immature Grans, Absolute   Date Value Ref Range Status   03/28/2024 0.09 (H) 0.00 - 0.05 10*3/mm3 Final     nRBC   Date Value Ref Range Status   03/28/2024 0.0 0.0 - 0.2 /100 WBC Final        Basic Metabolic Panel    Sodium Sodium   Date Value Ref Range Status   03/28/2024 137 136 - 145 mmol/L Final   03/26/2024 138 136 - 145 mmol/L Final      Potassium Potassium   Date Value Ref Range Status   03/28/2024 4.7 3.5 - 5.2 mmol/L Final   03/26/2024 4.4 3.5 - 5.2 mmol/L Final      Chloride Chloride   Date Value Ref Range Status   03/28/2024 100 98 - 107 mmol/L Final   03/26/2024 97 (L) 98 - 107 mmol/L Final      Bicarbonate No results found for: \"PLASMABICARB\"   BUN BUN   Date " "Value Ref Range Status   03/28/2024 35 (H) 8 - 23 mg/dL Final   03/26/2024 21 8 - 23 mg/dL Final      Creatinine Creatinine   Date Value Ref Range Status   03/28/2024 2.02 (H) 0.76 - 1.27 mg/dL Final   03/26/2024 1.23 0.76 - 1.27 mg/dL Final      Calcium Calcium   Date Value Ref Range Status   03/28/2024 8.1 (L) 8.6 - 10.5 mg/dL Final   03/26/2024 9.4 8.6 - 10.5 mg/dL Final      Glucose      No components found for: \"GLUCOSE.*\"      XR Chest 1 View   Final Result   Impression:   Increased diffuse interstitial opacities which may represent a component   of interstitial edema. No focal consolidation or pleural effusion.           Electronically Signed By-Kvng Beth MD On:3/27/2024 6:00 PM          XR Hip With or Without Pelvis 2 - 3 View Left   Final Result   Impression:   1.  Status post left hip arthroplasty.   2.  No immediate postoperative complication is identified.           Electronically Signed By-Rafael Pimentel MD On:3/27/2024 3:39 PM          FL Surgery Fluoro   Final Result      CT Pelvis Without Contrast   Final Result   Impression:   1.  Nondisplaced left femoral neck fracture   2.  Degenerative changes are noted involving the sacroiliac joints and   lower lumbar spine.   3.  Colonic diverticulosis.   4.  Enlarged prostate   5.  Left inguinal hernia containing fat.               Electronically Signed By-Aldo العراقي MD On:3/26/2024 6:15 PM          XR Chest 1 View   Final Result   No acute cardiopulmonary process identified.           Electronically Signed By-Osito Dutton MD On:3/26/2024 4:32 PM          XR Hip With or Without Pelvis 2 - 3 View Left   Final Result   1.  There is appearance to the left femur that is concerning for femoral   neck fracture with suggested foreshortening of the femoral neck.   Additional imaging may be helpful to better assess this.           Electronically Signed ByAndrew العراقي MD On:3/26/2024 4:34 PM               "

## 2024-03-28 NOTE — THERAPY EVALUATION
Patient Name: Clark Layton  : 1940    MRN: [id number]                              Today's Date: 3/28/2024       Admit Date: 3/26/2024    Visit Dx:     ICD-10-CM ICD-9-CM   1. Left displaced femoral neck fracture  S72.002A 820.8   2. Fall from chair, initial encounter  W07.XXXA E884.2   3. Dementia without behavioral disturbance, psychotic disturbance, mood disturbance, or anxiety, unspecified dementia severity, unspecified dementia type  F03.90 294.20   4. Impaired mobility and ADLs  Z74.09 V49.89    Z78.9      Patient Active Problem List   Diagnosis    Nicotine dependence, cigarettes, with other nicotine-induced disorders    High cholesterol    Gout    Essential hypertension    Medication management    Benign prostatic hyperplasia    Left displaced femoral neck fracture    Hip fracture    History of dementia     Past Medical History:   Diagnosis Date    Essential hypertension     Gout     High cholesterol 2018    Medication management 2018    Nicotine dependence, cigarettes, with other nicotine-induced disorders 2018     Past Surgical History:   Procedure Laterality Date    HIP BIPOLAR REPLACEMENT Left 3/27/2024    Procedure: HIP BIPOLAR ANTERIOR;  Surgeon: Bethany Carlson MD;  Location: [facility];  Service: Orthopedics;  Laterality: Left;      General Information       Row Name 24 1124          OT Time and Intention    Document Type evaluation  -AC     Mode of Treatment individual therapy;occupational therapy  -AC       Row Name 24 1124          General Information    Patient Profile Reviewed yes  -AC     Prior Level of Function --  patient is confused, wife states this is not his baseline. Wife states (I) with adls and functional mobility, walk in shower with seat, raised standard toilets without grab bars.  -AC     Existing Precautions/Restrictions fall;weight bearing  -AC     Barriers to Rehab none identified  -AC       Row Name 24 1124          Occupational  "Profile    Reason for Services/Referral (Occupational Profile) Pt. is a 83year old male admitted for the above diagnosis status post bipolar anterior hip 3/2724. Pt. referred to OT services to assess independence with ADLs and adl transfers/fx'l mobility. No previous OT services for current condition.  -       Row Name 24 1124          Living Environment    People in Home spouse  -       Row Name 24 1124          Home Main Entrance    Number of Stairs, Main Entrance two  -       Row Name 24 1124          Cognition    Orientation Status (Cognition) oriented to;person  patient stated name and . patient confused about PLOF, \"stated I'm not sure\", easily agitated and distractable, resistive at times.  -       Row Name 24 1124          Safety Issues, Functional Mobility    Safety Issues Affecting Function (Mobility) ability to follow commands;insight into deficits/self-awareness;judgment;sequencing abilities;safety precautions follow-through/compliance  -     Impairments Affecting Function (Mobility) balance;cognition;endurance/activity tolerance;pain;shortness of breath  -               User Key  (r) = Recorded By, (t) = Taken By, (c) = Cosigned By      Initials Name Provider Type     Anna Houston, OT Occupational Therapist                     Mobility/ADL's       Row Name 24 1130          Bed Mobility    Bed Mobility bed mobility (all) activities  -     All Activities, [place] (Bed Mobility) maximum assist (25% patient effort);moderate assist (50% patient effort)  -     Bed Mobility, Safety Issues cognitive deficits limit understanding;decreased use of arms for pushing/pulling;decreased use of legs for bridging/pushing  -     Assistive Device (Bed Mobility) bed rails;draw sheet;head of bed elevated  -       Row Name 24 1130          Transfers    Transfers sit-stand transfer  -       Row Name 24 1130          Sit-Stand Transfer    Sit-Stand " [place] (Transfers) moderate assist (50% patient effort);verbal cues;1 person assist;nonverbal cues (demo/gesture);2 person assist  -     Assistive Device (Sit-Stand Transfers) walker, front-wheeled  -     Comment, (Sit-Stand Transfer) patient required increased cues for safety and technique, leaned posteriorly  -       Row Name 03/28/24 1130          Activities of Daily Living    BADL Assessment/Intervention --  patient is max/dep for lower body bathing/dressing, max/dep for toileting, mod/max A for upper body bathing/dressing, mod A for grooming, setup for self-feeding  -       Row Name 03/28/24 1130          Mobility    Extremity Weight-bearing Status left lower extremity  -     Left Lower Extremity (Weight-bearing Status) weight-bearing as tolerated (WBAT)  -               User Key  (r) = Recorded By, (t) = Taken By, (c) = Cosigned By      Initials Name Provider Type     Anna Houston OT Occupational Therapist                   Obj/Interventions       Row Name 03/28/24 1135          Sensory Assessment (Somatosensory)    Sensory Assessment (Somatosensory) UE sensation intact  -AC       Row Name 03/28/24 1135          Vision Assessment/Intervention    Visual Impairment/Limitations WFL  -[facility] Name 03/28/24 1135          Range of Motion Comprehensive    General Range of Motion bilateral upper extremity ROM WNL  -[facility] Name 03/28/24 1135          Strength Comprehensive (MMT)    Comment, General Manual Muscle Testing (MMT) Assessment wfl for adls  -[facility] Name 03/28/24 1135          Motor Skills    Motor Skills coordination;functional endurance  -     Coordination WFL  -     Functional Endurance fair minus for adls  -AC       [facility] Name 03/28/24 1135          Balance    Balance Assessment standing dynamic balance  -     Dynamic Standing Balance minimal assist;verbal cues;non-verbal cues (demo/gesture);moderate assist  -AC     Position/Device Used, Standing Balance  supported;walker, rolling  -AC               User Key  (r) = Recorded By, (t) = Taken By, (c) = Cosigned By      Initials Name Provider Type    AC Anna Houston, OT Occupational Therapist                   Goals/Plan       Row Name 03/28/24 1138          Bed Mobility Goal 1 (OT)    Activity/Assistive Device (Bed Mobility Goal 1, OT) bed mobility activities, all  -AC     [place] Level/Cues Needed (Bed Mobility Goal 1, OT) modified independence  -AC     Time Frame (Bed Mobility Goal 1, OT) long term goal (LTG);10 days  -AC       Row Name 03/28/24 1138          Transfer Goal 1 (OT)    Activity/Assistive Device (Transfer Goal 1, OT) transfers, all  -AC     [place] Level/Cues Needed (Transfer Goal 1, OT) modified independence  -AC     Time Frame (Transfer Goal 1, OT) long term goal (LTG);10 days  -AC       Row Name 03/28/24 1138          Bathing Goal 1 (OT)    Activity/Device (Bathing Goal 1, OT) bathing skills, all  -AC     [place] Level/Cues Needed (Bathing Goal 1, OT) modified independence  -AC     Time Frame (Bathing Goal 1, OT) long term goal (LTG);10 days  -AC       Row Name 03/28/24 1138          Dressing Goal 1 (OT)    Activity/Device (Dressing Goal 1, OT) dressing skills, all  -AC     [place]/Cues Needed (Dressing Goal 1, OT) modified independence  -AC     Time Frame (Dressing Goal 1, OT) long term goal (LTG);10 days  -AC       Row Name 03/28/24 1138          Toileting Goal 1 (OT)    Activity/Device (Toileting Goal 1, OT) toileting skills, all  -AC     [place] Level/Cues Needed (Toileting Goal 1, OT) modified independence  -AC     Time Frame (Toileting Goal 1, OT) long term goal (LTG);10 days  -AC       Row Name 03/28/24 1138          Problem Specific Goal 1 (OT)    Problem Specific Goal 1 (OT) patient will improve endurance to good for adls.  -AC     Time Frame (Problem Specific Goal 1, OT) long term goal (LTG);10 days  -AC       Row Name 03/28/24 1138          Therapy Assessment/Plan  (OT)    Planned Therapy Interventions (OT) activity tolerance training;functional balance retraining;occupation/activity based interventions;ROM/therapeutic exercise;transfer/mobility retraining;patient/caregiver education/training;BADL retraining  -               User Key  (r) = Recorded By, (t) = Taken By, (c) = Cosigned By      Initials Name Provider Type     Anna Houston OT Occupational Therapist                   Clinical Impression       Row Name 03/28/24 1136          Pain Assessment    Pretreatment Pain Rating 1/10  -     Posttreatment Pain Rating 1/10  -     Pain Location - Side/Orientation Left  -AC     Pain Location lower  -     Pain Location - extremity  -AC       Row Name 03/28/24 1136          Plan of Care Review    Plan of Care Reviewed With patient  -     Progress no change  -     Outcome Evaluation Patient presents with balance and endurance limitations that impede his/her ability to perform ADLS. The skills of a therapist are necessary to maximize independence with ADLs.  -       Row Name 03/28/24 1136          Therapy Assessment/Plan (OT)    Patient/Family Therapy Goal Statement (OT) Patient would like to maximize independence with adls.  -     Rehab Potential (OT) good, to achieve stated therapy goals  -     Criteria for Skilled Therapeutic Interventions Met (OT) yes;meets criteria;skilled treatment is necessary  -     Therapy Frequency (OT) 5 times/wk  -       Row Name 03/28/24 1136          Therapy Plan Review/Discharge Plan (OT)    Equipment Needs Upon Discharge (OT) walker, rolling;commode chair;shower chair  -     Anticipated Discharge Disposition (OT) inpatient rehabilitation facility  -       Row Name 03/28/24 1136          Positioning and Restraints    Pre-Treatment Position in bed  -     Post Treatment Position chair  -AC     In Chair reclined;call light within reach;encouraged to call for assist;exit alarm on;legs elevated;with family/caregiver  -                User Key  (r) = Recorded By, (t) = Taken By, (c) = Cosigned By      Initials Name Provider Type    Anna Dias OT Occupational Therapist                   Outcome Measures       Row Name 03/28/24 1139          How much help from another is currently needed...    Putting on and taking off regular lower body clothing? 1  -AC     Bathing (including washing, rinsing, and drying) 1  -AC     Toileting (which includes using toilet bed pan or urinal) 1  -AC     Putting on and taking off regular upper body clothing 2  -AC     Taking care of personal grooming (such as brushing teeth) 3  -AC     Eating meals 4  -AC     AM-PAC 6 Clicks Score (OT) 12  -AC       Row Name 03/28/24 1139          Functional Assessment    Outcome Measure Options AM-PAC 6 Clicks Daily Activity (OT);Optimal Instrument  -       Row Name 03/28/24 1139          Optimal Instrument    Optimal Instrument Optimal - 3  -AC     Bending/Stooping 4  -AC     Standing 3  -AC     Reaching 2  -AC     From the list, choose the 3 activities you would most like to be able to do without any difficulty Bending/stooping;Standing;Reaching  -AC     Total Score Optimal - 3 9  -AC               User Key  (r) = Recorded By, (t) = Taken By, (c) = Cosigned By      Initials Name Provider Type    Anna Dias OT Occupational Therapist                    Occupational Therapy Education       Title: PT OT SLP Therapies (Done)       Topic: Occupational Therapy (Done)       Point: ADL training (Done)       Description:   Instruct learner(s) on proper safety adaptation and remediation techniques during self care or transfers.   Instruct in proper use of assistive devices.                  Learning Progress Summary             Patient Acceptance, E, VU by SAROJ at 3/28/2024 1140                         Point: Home exercise program (Done)       Description:   Instruct learner(s) on appropriate technique for monitoring, assisting and/or progressing therapeutic  exercises/activities.                  Learning Progress Summary             Patient Acceptance, E, VU by  at 3/28/2024 1140                         Point: Precautions (Done)       Description:   Instruct learner(s) on prescribed precautions during self-care and functional transfers.                  Learning Progress Summary             Patient Acceptance, E, VU by  at 3/28/2024 1140                         Point: Body mechanics (Done)       Description:   Instruct learner(s) on proper positioning and spine alignment during self-care, functional mobility activities and/or exercises.                  Learning Progress Summary             Patient Acceptance, E, VU by  at 3/28/2024 1140                                         User Key       Initials Effective Dates Name Provider Type Discipline     06/16/21 -  Anna Houston OT Occupational Therapist OT                  OT Recommendation and Plan  Planned Therapy Interventions (OT): activity tolerance training, functional balance retraining, occupation/activity based interventions, ROM/therapeutic exercise, transfer/mobility retraining, patient/caregiver education/training, BADL retraining  Therapy Frequency (OT): 5 times/wk  Plan of Care Review  Plan of Care Reviewed With: patient  Progress: no change  Outcome Evaluation: Patient presents with balance and endurance limitations that impede his/her ability to perform ADLS. The skills of a therapist are necessary to maximize independence with ADLs.     Time Calculation:   Evaluation Complexity (OT)  Review Occupational Profile/Medical/Therapy History Complexity: expanded/moderate complexity  Assessment, Occupational Performance/Identification of Deficit Complexity: 1-3 performance deficits  Clinical Decision Making Complexity (OT): problem focused assessment/low complexity  Overall Complexity of Evaluation (OT): low complexity     Time Calculation- OT       Row Name 03/28/24 1141             Time Calculation- OT     OT Received On 03/28/24  -      OT Goal Re-Cert Due Date 04/06/24  -AC         Untimed Charges    OT Eval/Re-eval Minutes 31  -AC         Total Minutes    Untimed Charges Total Minutes 31  -AC       Total Minutes 31  -AC                User Key  (r) = Recorded By, (t) = Taken By, (c) = Cosigned By      Initials Name Provider Type    AC Anna Houston OT Occupational Therapist                  Therapy Charges for Today       Code Description Service Date Service Provider Modifiers Qty    [id number] HC OT EVAL LOW COMPLEXITY 3 3/28/2024 Anna Houston OT GO 1                 Anna Houston OT  3/28/2024

## 2024-03-28 NOTE — CONSULTS
Pulmonary / Critical Care Consult Note      Patient Name: Clark Layton  : 1940  MRN: [id number]  Primary Care Physician:  Honorio Field MD  Referring Physician: Murtaza Tanner MD  Date of admission: 3/26/2024    Subjective   Subjective     Reason for Consult/ Chief Complaint:   Acute hypoxic and hypercapnic respiratory failure quiring high flow nasal cannula    HPI:  Clakr Layton is a 83 y.o. male with past medical history of tobacco abuse of cigarettes since , hypertension, CKD stage III, dementia presented to the ED for evaluation of severe pain in his left hip after falling.  Patient was planned for left hip hemiarthroplasty.  After procedure, patient supplemental oxygen demand continue to increase overnight to 7 L high flow nasal cannula.  CXR demonstrated increased diffuse interstitial opacities concerning for interstitial edema with no focal consolidation or pleural effusions.  Given concern for acute hypoxemia s/p left hip arthroplasty, pulmonary embolism workup was initiated.  For the above reasons, the service was consulted to assist in management of the patient's acute issues.  On assessment, patient sitting in chair on 7 L high flow nasal cannula.  Patiently is happily hypoxemic.  Continuous pulse ox monitoring demonstrated an SpO2 of 95 when patient was still with a good waveform.  Wife stated patient has dementia and is a little more confused/restless than he normally is.  Stated that the patient does not use any home O2    Review of Systems  Constitutional symptoms:  Denied complaints   Ear, nose, throat: Denied complaints  Cardiovascular:  Denied complaints  Respiratory: Denied complaints  Gastrointestinal: Denied complaints  Musculoskeletal: Denied complaints  Genitourinary: Denied complaints  Allergy / Immunology: Denied complaints  Hematologic: Denied complaints  Neurologic: Denied complaints  Skin: Denied complaints  Endocrine: Denied complaints  Psychiatric: Denied  complaints      Personal History     Past Medical History:   Diagnosis Date    Essential hypertension     Gout     High cholesterol 12/19/2018    Medication management 12/19/2018    Nicotine dependence, cigarettes, with other nicotine-induced disorders 09/19/2018       Past Surgical History:   Procedure Laterality Date    HIP BIPOLAR REPLACEMENT Left 3/27/2024    Procedure: HIP BIPOLAR ANTERIOR;  Surgeon: Bethany Carlson MD;  Location: [facility];  Service: Orthopedics;  Laterality: Left;       Family History: family history includes Brain cancer in his brother; Heart disease in his father. Otherwise pertinent FHx was reviewed and not pertinent to current issue.    Social History:  reports that he has been smoking cigarettes. He started smoking about 62 years ago. He has a 62.2 pack-year smoking history. He has never used smokeless tobacco. He reports that he does not currently use alcohol after a past usage of about 7.0 standard drinks of alcohol per week. He reports that he does not use drugs.    Home Medications:  allopurinol, amLODIPine, hydroCHLOROthiazide, and tamsulosin    Allergies:  No Known Allergies    Objective    Objective     Vitals:   Temp:  [97.8 °F (36.6 °C)-98.5 °F (36.9 °C)] 98.4 °F (36.9 °C)  Heart Rate:  [63-79] 79  Resp:  [12-22] 18  BP: ()/(39-64) 133/53  Flow (L/min):  [6-7.5] 7    Physical Exam:  Vital Signs Reviewed   General:  WDWN, Alert, NAD.  Pleasantly confused elderly male, sitting in chair  HEENT:  PERRL, EOMI.  OP, nares clear, no sinus tenderness  Neck:  Supple, no JVD, no thyromegaly  Lymph: no axillary, cervical, supraclavicular lymphadenopathy noted bilaterally  Chest: Good aeration with coarse crackles on auscultation, no work of breathing noted on 7 L high flow  CV: RRR, no MGR, pulses 2+, equal.  Abd:  Soft, NT, ND, + BS, no HSM  EXT:  no clubbing, no cyanosis, no edema, no joint tenderness  Neuro:  A&Ox3, CN grossly intact, no focal deficits.  Skin: No rashes or  lesions noted      Result Review    Result Review:  I have personally reviewed the results from the time of this admission to 3/28/2024 15:16 EDT and agree with these findings:  [x]  Laboratory  [x]  Microbiology  [x]  Radiology  []  EKG/Telemetry   []  Cardiology/Vascular   []  Pathology  [x]  Old records  [x]  Other:  Most notable findings include:         Lab 03/28/24  0217 03/26/24  1553   WBC 13.67* 14.30*   HEMOGLOBIN 10.9* 14.2   HEMATOCRIT 35.1* 45.2   PLATELETS 161 230   SODIUM 137 138   POTASSIUM 4.7 4.4   CHLORIDE 100 97*   CO2 28.9 32.1*   BUN 35* 21   CREATININE 2.02* 1.23   GLUCOSE 115* 122*   CALCIUM 8.1* 9.4   PHOSPHORUS 4.8*  --    TOTAL PROTEIN  --  7.4   ALBUMIN 3.0* 4.1   GLOBULIN  --  3.3       Assessment & Plan   Assessment / Plan     Active Hospital Problems:  Active Hospital Problems    Diagnosis     **Hip fracture     History of dementia     Gout     Essential hypertension     Nicotine dependence, cigarettes, with other nicotine-induced disorders      Impression:  Acute hypoxic and hypercapnic respiratory failure requiring high flow  Concern for pulmonary embolus  Congestive diastolic heart failure  Acute cardiogenic pulmonary edema  Mild aortic valve stenosis  S/p left hip hemiarthroplasty  Tobacco abuse of cigarettes  Concern for undiagnosed COPD  Alzheimer's dementia  Mechanical fall    Plan:  -On 7 L high flow nasal cannula.  Continue to wean supplemental oxygen to maintain SpO2 greater than 90%.  Patient does not wear any home O2  -3/27 CXR demonstrates diffuse interstitial opacities which may represent a component of interstitial edema.  No pleural effusions noted  -Chest CT pending  -Patient had elevated D-dimer, 4.79.  Patient underwent VQ scan done demonstrating no peripheral wedge-shaped defects to suggest presence of underlying pulmonary embolism  -3/28 echo demonstrates an EF of 61 to 65% with grade 1 diastolic dysfunction and mild aortic valve stenosis  -Continue Brovana,  Pulmicort, and DuoNebs  -Continue to monitor renal panel and electrolytes.  Replace electrolytes as necessary  -Continue bronchopulmonary hygiene.  Encourage I-S and flutter  -Encourage mobilization.  Out of bed to chair  -PT/OT/SLP on board.  Appreciate assistance  -Ortho following for s/p left Turner hip arthroplasty  -Nephrology following for BÁRBARA.  Holding off on diuretics at this time  -Rest of care per primary    Electronically signed by Radha Li MD, 03/28/24, 3:47 PM EDT.      DVT prophylaxis:  Medical and mechanical DVT prophylaxis orders are present.    Code Status and Medical Interventions:   Ordered at: 03/27/24 1733     Medical Intervention Limits:    NO intubation (DNI)    NO cardioversion     Code Status (Patient has no pulse and is not breathing):    No CPR (Do Not Attempt to Resuscitate)     Medical Interventions (Patient has pulse or is breathing):    Limited Support      Labs, imaging, notes and medications personally reviewed.  Discussed with patient and primary  Electronically signed by KAMERON Hathaway, 03/28/24, 3:40 PM EDT.

## 2024-03-28 NOTE — PLAN OF CARE
Goal Outcome Evaluation:      Patient very drowsy this shift both pre and s/p surgery. His oxygen saturation low this am, 6L oxygen per nc applied. He underwent anterior left hip hemiarthroplasty this shift. He is currently on iv fluids. Wife at bedside. PO meds held this afternoon r/t his inability to swallow safely.

## 2024-03-28 NOTE — PLAN OF CARE
Goal Outcome Evaluation:  Plan of Care Reviewed With: patient           Outcome Evaluation: Pt presents with decreased transfers and ambulation.  Skilled PT services will be required  to address his mobitiy deficits      Anticipated Discharge Disposition (PT): sub acute care setting

## 2024-03-28 NOTE — THERAPY EVALUATION
Acute Care - Physical Therapy Initial Evaluation   Meryl     Patient Name: Clark Layton  : 1940  MRN: [id number]  Today's Date: 3/28/2024     Admit date: 3/26/2024     Referring Physician: Murtaza Tanner MD     Surgery Date:3/26/2024 - 3/27/2024   Procedure(s) (LRB):  HIP BIPOLAR ANTERIOR (Left)          Visit Dx:     ICD-10-CM ICD-9-CM   1. Left displaced femoral neck fracture  S72.002A 820.8   2. Fall from chair, initial encounter  W07.XXXA E884.2   3. Dementia without behavioral disturbance, psychotic disturbance, mood disturbance, or anxiety, unspecified dementia severity, unspecified dementia type  F03.90 294.20   4. Impaired mobility and ADLs  Z74.09 V49.89    Z78.9    5. Difficulty in walking  R26.2 719.7     Patient Active Problem List   Diagnosis    Nicotine dependence, cigarettes, with other nicotine-induced disorders    High cholesterol    Gout    Essential hypertension    Medication management    Benign prostatic hyperplasia    Left displaced femoral neck fracture    Hip fracture    History of dementia     Past Medical History:   Diagnosis Date    Essential hypertension     Gout     High cholesterol 2018    Medication management 2018    Nicotine dependence, cigarettes, with other nicotine-induced disorders 2018     Past Surgical History:   Procedure Laterality Date    HIP BIPOLAR REPLACEMENT Left 3/27/2024    Procedure: HIP BIPOLAR ANTERIOR;  Surgeon: Bethany Carlson MD;  Location: [facility];  Service: Orthopedics;  Laterality: Left;     PT Assessment (Last 12 Hours)       PT Evaluation and Treatment       Row Name 24 0930          Physical Therapy Time and Intention    Subjective Information no complaints  -RULA     Document Type evaluation  -RULA     Mode of Treatment individual therapy;physical therapy  -RULA     Patient Effort fair  -RULA       Row Name 24 0917          General Information    Patient Observations alert;agree to therapy  -RULA     Prior Level of  Function independent:;all household mobility;community mobility  -RULA     Equipment Currently Used at Home walker, rolling  -RULA     Existing Precautions/Restrictions fall  -RULA     Barriers to Rehab cognitive status  -RULA       Row Name 03/28/24 0930          Living Environment    Current Living Arrangements home  -RULA     People in Home spouse  -RULA       Row Name 03/28/24 0930          Range of Motion (ROM)    Range of Motion ROM is WFL  -RULA       Row Name 03/28/24 0930          Strength (Manual Muscle Testing)    Strength (Manual Muscle Testing) --  Pt unable to follow commads for formal MMT  -RULA       Row Name 03/28/24 0930          Mobility    Extremity Weight-bearing Status left lower extremity  -RULA     Left Lower Extremity (Weight-bearing Status) weight-bearing as tolerated (WBAT)  -RULA       Row Name 03/28/24 0930          Bed Mobility    Bed Mobility bed mobility (all) activities;supine-sit  -RULA     All Activities, Harper (Bed Mobility) moderate assist (50% patient effort)  -URLA     Supine-Sit Harper (Bed Mobility) moderate assist (50% patient effort)  -RULA       Row Name 03/28/24 0930          Transfers    Transfers bed-chair transfer;sit-stand transfer  -RULA       Row Name 03/28/24 0930          Bed-Chair Transfer    Bed-Chair Harper (Transfers) moderate assist (50% patient effort)  -RULA     Assistive Device (Bed-Chair Transfers) walker, front-wheeled  -RULA       Row Name 03/28/24 0930          Sit-Stand Transfer    Sit-Stand Harper (Transfers) moderate assist (50% patient effort)  -RULA     Assistive Device (Sit-Stand Transfers) walker, front-wheeled  -RULA       Row Name 03/28/24 0930          Gait/Stairs (Locomotion)    Gait/Stairs Locomotion gait/ambulation assistive device  -RULA     Harper Level (Gait) moderate assist (50% patient effort)  -RULA     Assistive Device (Gait) walker, front-wheeled  -RULA     Patient was able to Ambulate yes  -RULA     Distance in Feet (Gait) 30  -RULA     Pattern  (Gait) step-to  -RULA       Row Name 03/28/24 0930          Safety Issues, Functional Mobility    Impairments Affecting Function (Mobility) balance;pain;strength  -RULA       Row Name 03/28/24 0930          Balance    Dynamic Standing Balance moderate assist  -RULA     Position/Device Used, Standing Balance walker, rolling  -RULA       Row Name             Wound 03/27/24 1400 Left lateral thigh Incision    Wound - Properties Group Placement Date: 03/27/24  -AR Placement Time: 1400  -AR Side: Left  -AR Orientation: lateral  -AR Location: thigh  -AR Primary Wound Type: Incision  -AR    Retired Wound - Properties Group Placement Date: 03/27/24  -AR Placement Time: 1400  -AR Side: Left  -AR Orientation: lateral  -AR Location: thigh  -AR Primary Wound Type: Incision  -AR    Retired Wound - Properties Group Date first assessed: 03/27/24  -AR Time first assessed: 1400  -AR Side: Left  -AR Location: thigh  -AR Primary Wound Type: Incision  -AR      Row Name 03/28/24 0930          Plan of Care Review    Plan of Care Reviewed With patient  -RULA     Outcome Evaluation Pt presents with decreased transfers and ambulation.  Skilled PT services will be required  to address his mobitiy deficits  -RULA       Row Name 03/28/24 0930          Therapy Assessment/Plan (PT)    Rehab Potential (PT) fair, will monitor progress closely  -RULA     Criteria for Skilled Interventions Met (PT) skilled treatment is necessary  -RULA     Therapy Frequency (PT) daily  up to 2x  -RULA     Predicted Duration of Therapy Intervention (PT) 10 days  -RULA     Problem List (PT) problems related to;balance;mobility;strength;pain  -RULA     Activity Limitations Related to Problem List (PT) unable to ambulate safely;unable to transfer safely  -RULA       Row Name 03/28/24 0930          PT Evaluation Complexity    History, PT Evaluation Complexity no personal factors and/or comorbidities  -RULA     Examination of Body Systems (PT Eval Complexity) total of 4 or more elements  -RULA      Clinical Presentation (PT Evaluation Complexity) stable  -RULA     Clinical Decision Making (PT Evaluation Complexity) low complexity  -RULA     Overall Complexity (PT Evaluation Complexity) low complexity  -RULA       Row Name 03/28/24 0930          Therapy Plan Review/Discharge Plan (PT)    Therapy Plan Review (PT) evaluation/treatment results reviewed;participants voiced agreement with care plan;participants included;patient  -RULA       Row Name 03/28/24 0930          Physical Therapy Goals    Transfer Goal Selection (PT) transfer, PT goal 1  -RULA     Gait Training Goal Selection (PT) gait training, PT goal 1  -RULA       Row Name 03/28/24 0930          Transfer Goal 1 (PT)    Activity/Assistive Device (Transfer Goal 1, PT) transfers, all  -RULA     [place]von Level/Cues Needed (Transfer Goal 1, PT) independent  -RULA     Time Frame (Transfer Goal 1, PT) long term goal (LTG);10 days  -RULA       Row Name 03/28/24 0930          Gait Training Goal 1 (PT)    Activity/Assistive Device (Gait Training Goal 1, PT) gait (walking locomotion);assistive device use;walker, rolling  -RULA     [place] Level (Gait Training Goal 1, PT) independent  -RULA     Distance (Gait Training Goal 1, PT) 300  -RULA     Time Frame (Gait Training Goal 1, PT) long term goal (LTG);10 days  -RULA               User Key  (r) = Recorded By, (t) = Taken By, (c) = Cosigned By      Initials Name Provider Type    JARETT Green, April, RN CSA Registered Nurse    Bernabe Harris, PT Physical Therapist                      PT Recommendation and Plan  Anticipated Discharge Disposition (PT): sub acute care setting  Planned Therapy Interventions (PT): balance training, bed mobility training, gait training, home exercise program, transfer training, stair training, strengthening  Therapy Frequency (PT): daily (up to 2x)  Plan of Care Reviewed With: patient  Outcome Evaluation: Pt presents with decreased transfers and ambulation.  Skilled PT services will be required  to  address his mobitiy deficits   Outcome Measures       Row Name 03/28/24 1300             How much help from another person do you currently need...    Turning from your back to your side while in flat bed without using bedrails? 2  -RULA      Moving from lying on back to sitting on the side of a flat bed without bedrails? 2  -RULA      Moving to and from a bed to a chair (including a wheelchair)? 2  -RULA      Standing up from a chair using your arms (e.g., wheelchair, bedside chair)? 2  -RULA      Climbing 3-5 steps with a railing? 1  -RULA      To walk in hospital room? 1  -RULA      AM-PAC 6 Clicks Score (PT) 10  -RULA      Highest Level of Mobility Goal 4 --> Transfer to chair/commode  -RULA         Functional Assessment    Outcome Measure Options AM-PAC 6 Clicks Basic Mobility (PT)  -RULA                User Key  (r) = Recorded By, (t) = Taken By, (c) = Cosigned By      Initials Name Provider Type    Bernabe Harris PT Physical Therapist                     Time Calculation:    PT Charges       Row Name 03/28/24 1329             Time Calculation    PT Received On 03/28/24  -RULA      PT Goal Re-Cert Due Date 04/06/24  -RULA         Untimed Charges    PT Eval/Re-eval Minutes 30  -RULA         Total Minutes    Untimed Charges Total Minutes 30  -RULA       Total Minutes 30  -RULA                User Key  (r) = Recorded By, (t) = Taken By, (c) = Cosigned By      Initials Name Provider Type    Bernabe Harris PT Physical Therapist                  Therapy Charges for Today       Code Description Service Date Service Provider Modifiers Qty    05490088440 HC PT EVAL LOW COMPLEXITY 3 3/28/2024 Bernabe Villegas PT GP 1            PT G-Codes  Outcome Measure Options: AM-PAC 6 Clicks Basic Mobility (PT)  AM-PAC 6 Clicks Score (PT): 10  AM-PAC 6 Clicks Score (OT): 12    Bernabe Villegas PT  3/28/2024

## 2024-03-28 NOTE — CONSULTS
[facility]   Consult Note    Patient Name: Clark aLyton  : 1940  MRN: [id number]  Primary Care Physician:  Honorio Field MD  Referring Physician: No ref. provider found  Date of admission: 3/26/2024    Subjective   Subjective     Reason for Consult/ Chief Complaint: BÁRBARA    HPI:  Clark Layton is a 83 y.o. male 83-year-old male with past medical history of hypertension, hyperuricemia, BPH, CKD stage IIIa, dementia, nicotine dependence who came to the hospital after a fall with x-ray showing femoral neck fracture status post surgery and noted to be hypoxic requiring 4 to 7 L of oxygen with soft blood pressures postprocedure.    Upon seeing the patient he was in no acute distress saturating normally on 7 L of oxygen.  He had no acute active complaints except for some pain.  His vitals were stable with blood pressure in the 1 teens over 50s and was afebrile.  His chemistries show a rising creatinine to 2 from his baseline of 1.2-1.4.  He has a Crowder catheter in place with brisk urine output.  His white count was noted to be elevated and hemoglobin had dropped from 14-11.  Nephrology has been consulted for management of BÁRBARA    Review of Systems  Constitutional:        Weakness tiredness fatigue  Eyes:                       No blurry vision, eye discharge, eye irritation, eye pain  HEENT:                   No acute hair loss, earache and discharge, nasal congestion or discharge, sore throat, postnasal drip  Respiratory:           No shortness of breath coughing sputum production wheezing hemoptysis pleuritic chest pain  Cardiovascular:     No chest pain, orthopnea, PND, dizziness, palpitation, lower extremity edema  Gastrointestinal:   No nausea vomiting diarrhea abdominal pain constipation  Genitourinary:       No urinary incontinence, hesitancy, frequency, urgency, dysuria  Neurological:        No confusion, headache, focal weakness, numbness, dysphasia  Hematologic:         No bruising,  bleeding, pallor, lymphadenopathy  Endocrine:            No coldness, hot flashes, polyuria, abnormal hair growth  Musculoskeletal:  No body pains, aches, arthritic pains, muscle pain ,muscle wasting  Psychiatric:          No low or high mood, anxiety, hallucinations, delusions  Skin.                      No rash, ulcers, bruising, itching    Personal History     Past Medical History:   Diagnosis Date    Essential hypertension     Gout     High cholesterol 12/19/2018    Medication management 12/19/2018    Nicotine dependence, cigarettes, with other nicotine-induced disorders 09/19/2018       Past Surgical History:   Procedure Laterality Date    HIP BIPOLAR REPLACEMENT Left 3/27/2024    Procedure: HIP BIPOLAR ANTERIOR;  Surgeon: Bethany Carlson MD;  Location: [facility] OR;  Service: Orthopedics;  Laterality: Left;       Family History: family history includes Brain cancer in his brother; Heart disease in his father. Otherwise pertinent FHx was reviewed and not pertinent to current issue.    Social History:  reports that he has been smoking cigarettes. He started smoking about 62 years ago. He has a 62.2 pack-year smoking history. He has never used smokeless tobacco. He reports that he does not currently use alcohol after a past usage of about 7.0 standard drinks of alcohol per week. He reports that he does not use drugs.    Home Medications:  allopurinol, amLODIPine, hydroCHLOROthiazide, and tamsulosin    Allergies:  No Known Allergies    Objective    Objective     Vitals:   Temp:  [97.3 °F (36.3 °C)-98.5 °F (36.9 °C)] 97.9 °F (36.6 °C)  Heart Rate:  [63-82] 79  Resp:  [12-24] 20  BP: ()/(39-64) 117/52  Flow (L/min):  [4-7.5] 7    Physical Exam:             Constitutional:         Awake, alert responsive, conversant, no obvious distress   Eyes:                       PERRLA, sclerae anicteric, no conjunctival injection   HEENT:                   Moist mucous membranes, no nasal or eye discharge, no throat  congestion   Neck:                      Supple, no thyromegaly, no lymphadenopathy, trachea midline, no elevated JVD   Respiratory:           Clear to auscultation bilaterally, nonlabored respirations    Cardiovascular:     RRR, no murmurs, rubs, or gallops, palpable pedal pulses bilaterally, No bilateral ankle edema   Gastrointestinal:   Positive bowel sounds, soft, nontender, non-distended, no organomegaly   Musculoskeletal:  No clubbing or cyanosis to extremities, muscle wasting, joint swelling, muscle weakness   Psychiatric:              Appropriate affect, cooperative   Neurologic:            Awake alert, oriented x 3, strength symmetric in all extremities, Cranial Nerves grossly intact to confrontation, speech clear   Skin:                      No rashes, bruising, skin ulcers, petechiae or ecchymosis    Result Review    Result Review:  I have personally reviewed the results from the time of this admission to 3/28/2024 09:48 EDT and agree with these findings:  []  Laboratory  []  Microbiology  []  Radiology  []  EKG/Telemetry   []  Cardiology/Vascular   []  Pathology  []  Old records  []  Other:    Results from last 7 days   Lab Units 03/28/24  0217 03/26/24  1553   WBC 10*3/mm3 13.67* 14.30*   HEMOGLOBIN g/dL 10.9* 14.2   PLATELETS 10*3/mm3 161 230     Results from last 7 days   Lab Units 03/28/24  0217 03/26/24  1553   SODIUM mmol/L 137 138   POTASSIUM mmol/L 4.7 4.4   CHLORIDE mmol/L 100 97*   CO2 mmol/L 28.9 32.1*   BUN mg/dL 35* 21   CREATININE mg/dL 2.02* 1.23   GLUCOSE mg/dL 115* 122*       Assessment & Plan   Assessment / Plan     Active Hospital Problems:  Active Hospital Problems    Diagnosis     **Hip fracture     History of dementia     Gout     Essential hypertension     Nicotine dependence, cigarettes, with other nicotine-induced disorders      83-year-old male with past medical history of hypertension, hyperuricemia, BPH, CKD stage IIIa, dementia, nicotine dependence who came to the hospital  after a fall with x-ray showing femoral neck fracture status post surgery and noted to be hypoxic requiring 4 to 7 L of oxygen with soft blood pressures postprocedure.  Noted to have an BÁRBARA with creatinine rise to 2 with chest x-ray showing some diffuse infiltrates with elevated D-dimer.  BÁRBARA most likely due to relative hypotension and hemodynamic changes.  Patient's proBNP normal at 600    Plan:   Repeat urine analysis ordered  Renal ultrasound pending  Due to elevated D-dimer, PE workup ongoing  Will hold off on Lasix at this time and will reassess once further imaging results are back.  Echo is also pending    Thank you for involving me in the care of the patient.  Will continue to follow along    Electronically signed by Stone Lopez MD, 03/28/24, 9:48 AM EDT.

## 2024-03-28 NOTE — PROGRESS NOTES
[facility]   Hospitalist Progress Note  Date: 3/28/2024  Patient Name: Clark Layton  : 1940  MRN: [id number]  Date of admission: 3/26/2024      Subjective   Subjective     Chief Complaint: Fall at home and left hip pain.    Summary:   Clark Layton is a 83 y.o. male with past medical history of hypertension, Alzheimer's hyperlipidemia, BPH, nicotine dependence, and GERD presented to the ED after a fall resulting in left hip injury.  Patient has advanced dementia so history was given by wife at bedside.  As per wife he was sitting on a stool on the kitchen island when he fell asleep and fell to the floor.  Patient with severe pain afterwards and was then transferred to the ED for further evaluation.  In the ED patient was hypoxic on arrival with remaining vitals being within normal limits.  Labs show that he had mild leukocytosis with remaining labs being relatively unremarkable.  X-ray of the hip showed a left femoral neck fracture with CT confirming nondisplaced left femoral neck fracture.  Orthopedic surgery was consulted and agreed to see the patient in the morning.  Patient admitted for further evaluation and treatment .  Patient had left hip anterior arthroplasty on .  Due to worsening kidney function and hypoxemia nephrology and pulmonary consulted.    Interval Followup:   Episode of confusion and agitation last night pulling on lines and tubes.  Given Haldol x 1.  Drop O2 sat to 84% room air.  Now on 6 L with 95% saturation.  No home oxygen use.  Patient is awake alert but confused  Does complain of pain in left hip  No prior shortness of air, cough, chest pain or fever noticed as per wife.  Does smoke cigarettes a pack per day per wife.    Review of Systems   All systems were reviewed and negative except for: As per summary and interval follow-up    Objective   Objective     Vitals:   Temp:  [97.8 °F (36.6 °C)-98.6 °F (37 °C)] 98.6 °F (37 °C)  Heart Rate:  [] 103  Resp:  [18-20]  18  BP: (100-136)/(41-68) 136/68  Flow (L/min):  [6-7.5] 7  Physical Exam    Constitutional: Awake and alert, no acute distress   Eyes: Pupils equal, sclerae anicteric, no conjunctival injection   HENT: NCAT, mucous membranes dry   Neck: Supple, no thyromegaly, no lymphadenopathy, trachea midline   Respiratory: Clear to auscultation bilaterally, nonlabored respirations    Cardiovascular: RRR, no murmurs, rubs, or gallops, palpable pedal pulses bilaterally   Gastrointestinal: Positive bowel sounds, soft, nontender, nondistended   Musculoskeletal: Left hip incision dressed.  Left lower extremity rotated externally.  Restricted range of motion.  No bilateral ankle edema, no clubbing or cyanosis to extremities   Psychiatric:  Appropriate affect, cooperative   Neurologic: Awake and alert oriented x 1, speech clear.  Cranial nerves grossly intact   Skin: No rashes .  Genitourinary.  Clear urine via Crowder catheter    Result Review    Result Review:  I have personally reviewed the results for the past 24 hours and agree with these findings:  [x]  Laboratory  []  Microbiology  [x]  Radiology  [x]  EKG/Telemetry normal sinus rhythm rate of 81.  QT interval 0.44  []  Cardiology/Vascular   []  Pathology  [x]  Old records  [x]  Other: Medications    Assessment & Plan   Assessment / Plan     Assessment:  Fall at home from sitting position with left hip pain.  Left displaced femoral neck fracture.  S/p left hip bipolar anterior arthroplasty on March 27.  Acute metabolic encephalopathy.  Hypoxia.  No home oxygen use.  Worsening  Possibly undiagnosed COPD  Tobacco use disorder.  Hypertension now soft blood pressure.  Gout.  Hyperlipidemia.  Alzheimer's dementia.  BPH.  Hematuria due to Crowder catheter.  Leukocytosis.  Likely reactive.  Acute kidney injury baseline creatinine around 1.3.  Objectively not clear.  Elevated D dimer in the postop patient.  Anemia.  Postop and from IV hydration.        Plan:  Continue supplemental oxygen  to keep sats more than 90%.  Stat ABG noted with respiratory acidosis.  NIPPV order placed but patient will not be able to tolerate it because of confusion.  Lung perfusion scan low probability for PE  CT chest pending  2D echo noted with diastolic dysfunction.  EF of 61%  Procalcitonin not significant  Discussed with pulmonary.  Appreciate input  Discussed with nephrology.  Holding off diuretics until further workup  Renal ultrasound negative for hydronephrosis  Urine sodium pending  CPK mildly elevated likely from surgery  Oral and IV narcotics along  for pain control on as-needed basis.  DC Toradol due to acute kidney injury  Nebulizer treatment.  Bronchodilator and bronchopulmonary hygiene protocol.  Incentive spirometry and flutter valve.  As needed Haldol for agitation  Hold home Norvasc and diuretic  Continue home Flomax and allopurinol.  Appreciate orthopedic input.  Nicotine patch.  Bowel regimen.  Continue Crowder catheter for another day  Speech therapy to evaluate for possible aspiration  PT OT  Continue telemetry for now    Discussed plan with RN and .  Rehab placement when stable    DVT prophylaxis:  Medical and mechanical DVT prophylaxis orders are present.        CODE STATUS:   Medical Intervention Limits: NO intubation (DNI); NO cardioversion  Code Status (Patient has no pulse and is not breathing): No CPR (Do Not Attempt to Resuscitate)  Medical Interventions (Patient has pulse or is breathing): Limited Support      Part of this note may be an electronic transcription/translation of spoken language to printed text using the Dragon Dictation System.       Electronically signed by Murtaza Tanner MD, 03/28/24, 4:18 PM EDT.

## 2024-03-29 LAB
ALBUMIN SERPL-MCNC: 3 G/DL (ref 3.5–5.2)
ANION GAP SERPL CALCULATED.3IONS-SCNC: 6.6 MMOL/L (ref 5–15)
BASOPHILS # BLD AUTO: 0.07 10*3/MM3 (ref 0–0.2)
BASOPHILS NFR BLD AUTO: 0.8 % (ref 0–1.5)
BUN SERPL-MCNC: 35 MG/DL (ref 8–23)
BUN/CREAT SERPL: 22.7 (ref 7–25)
CALCIUM SPEC-SCNC: 8.5 MG/DL (ref 8.6–10.5)
CHLORIDE SERPL-SCNC: 97 MMOL/L (ref 98–107)
CO2 SERPL-SCNC: 34.4 MMOL/L (ref 22–29)
CREAT SERPL-MCNC: 1.54 MG/DL (ref 0.76–1.27)
D-LACTATE SERPL-SCNC: 0.9 MMOL/L (ref 0.5–2)
DEPRECATED RDW RBC AUTO: 51.8 FL (ref 37–54)
EGFRCR SERPLBLD CKD-EPI 2021: 45 ML/MIN/1.73
EOSINOPHIL # BLD AUTO: 0.32 10*3/MM3 (ref 0–0.4)
EOSINOPHIL NFR BLD AUTO: 3.6 % (ref 0.3–6.2)
ERYTHROCYTE [DISTWIDTH] IN BLOOD BY AUTOMATED COUNT: 14.4 % (ref 12.3–15.4)
GLUCOSE SERPL-MCNC: 101 MG/DL (ref 65–99)
HCT VFR BLD AUTO: 30.9 % (ref 37.5–51)
HGB BLD-MCNC: 10 G/DL (ref 13–17.7)
IMM GRANULOCYTES # BLD AUTO: 0.07 10*3/MM3 (ref 0–0.05)
IMM GRANULOCYTES NFR BLD AUTO: 0.8 % (ref 0–0.5)
LYMPHOCYTES # BLD AUTO: 1.21 10*3/MM3 (ref 0.7–3.1)
LYMPHOCYTES NFR BLD AUTO: 13.7 % (ref 19.6–45.3)
MCH RBC QN AUTO: 32.3 PG (ref 26.6–33)
MCHC RBC AUTO-ENTMCNC: 32.4 G/DL (ref 31.5–35.7)
MCV RBC AUTO: 99.7 FL (ref 79–97)
MONOCYTES # BLD AUTO: 0.67 10*3/MM3 (ref 0.1–0.9)
MONOCYTES NFR BLD AUTO: 7.6 % (ref 5–12)
NEUTROPHILS NFR BLD AUTO: 6.49 10*3/MM3 (ref 1.7–7)
NEUTROPHILS NFR BLD AUTO: 73.5 % (ref 42.7–76)
NRBC BLD AUTO-RTO: 0 /100 WBC (ref 0–0.2)
PHOSPHATE SERPL-MCNC: 2.8 MG/DL (ref 2.5–4.5)
PLATELET # BLD AUTO: 150 10*3/MM3 (ref 140–450)
PMV BLD AUTO: 11.6 FL (ref 6–12)
POTASSIUM SERPL-SCNC: 3.9 MMOL/L (ref 3.5–5.2)
RBC # BLD AUTO: 3.1 10*6/MM3 (ref 4.14–5.8)
SODIUM SERPL-SCNC: 138 MMOL/L (ref 136–145)
VIT B12 BLD-MCNC: 1074 PG/ML (ref 211–946)
WBC NRBC COR # BLD AUTO: 8.83 10*3/MM3 (ref 3.4–10.8)

## 2024-03-29 PROCEDURE — 97110 THERAPEUTIC EXERCISES: CPT

## 2024-03-29 PROCEDURE — 80069 RENAL FUNCTION PANEL: CPT | Performed by: INTERNAL MEDICINE

## 2024-03-29 PROCEDURE — 25010000002 DIPHENHYDRAMINE PER 50 MG

## 2024-03-29 PROCEDURE — 85014 HEMATOCRIT: CPT | Performed by: STUDENT IN AN ORGANIZED HEALTH CARE EDUCATION/TRAINING PROGRAM

## 2024-03-29 PROCEDURE — 99232 SBSQ HOSP IP/OBS MODERATE 35: CPT | Performed by: INTERNAL MEDICINE

## 2024-03-29 PROCEDURE — 94799 UNLISTED PULMONARY SVC/PX: CPT

## 2024-03-29 PROCEDURE — 97530 THERAPEUTIC ACTIVITIES: CPT

## 2024-03-29 PROCEDURE — 82747 ASSAY OF FOLIC ACID RBC: CPT | Performed by: STUDENT IN AN ORGANIZED HEALTH CARE EDUCATION/TRAINING PROGRAM

## 2024-03-29 PROCEDURE — 94761 N-INVAS EAR/PLS OXIMETRY MLT: CPT

## 2024-03-29 PROCEDURE — 92610 EVALUATE SWALLOWING FUNCTION: CPT

## 2024-03-29 PROCEDURE — 99233 SBSQ HOSP IP/OBS HIGH 50: CPT | Performed by: STUDENT IN AN ORGANIZED HEALTH CARE EDUCATION/TRAINING PROGRAM

## 2024-03-29 PROCEDURE — 25010000002 ENOXAPARIN PER 10 MG: Performed by: INTERNAL MEDICINE

## 2024-03-29 PROCEDURE — 25010000002 CEFTRIAXONE PER 250 MG: Performed by: STUDENT IN AN ORGANIZED HEALTH CARE EDUCATION/TRAINING PROGRAM

## 2024-03-29 PROCEDURE — 85025 COMPLETE CBC W/AUTO DIFF WBC: CPT | Performed by: ORTHOPAEDIC SURGERY

## 2024-03-29 PROCEDURE — 83605 ASSAY OF LACTIC ACID: CPT | Performed by: STUDENT IN AN ORGANIZED HEALTH CARE EDUCATION/TRAINING PROGRAM

## 2024-03-29 PROCEDURE — 94664 DEMO&/EVAL PT USE INHALER: CPT

## 2024-03-29 PROCEDURE — 87040 BLOOD CULTURE FOR BACTERIA: CPT | Performed by: STUDENT IN AN ORGANIZED HEALTH CARE EDUCATION/TRAINING PROGRAM

## 2024-03-29 PROCEDURE — 25010000002 FUROSEMIDE PER 20 MG: Performed by: STUDENT IN AN ORGANIZED HEALTH CARE EDUCATION/TRAINING PROGRAM

## 2024-03-29 PROCEDURE — 82607 VITAMIN B-12: CPT | Performed by: STUDENT IN AN ORGANIZED HEALTH CARE EDUCATION/TRAINING PROGRAM

## 2024-03-29 RX ORDER — FUROSEMIDE 10 MG/ML
60 INJECTION INTRAMUSCULAR; INTRAVENOUS 3 TIMES DAILY
Status: DISCONTINUED | OUTPATIENT
Start: 2024-03-29 | End: 2024-03-31

## 2024-03-29 RX ORDER — DIPHENHYDRAMINE HYDROCHLORIDE 50 MG/ML
10 INJECTION INTRAMUSCULAR; INTRAVENOUS ONCE
Status: COMPLETED | OUTPATIENT
Start: 2024-03-29 | End: 2024-03-29

## 2024-03-29 RX ADMIN — FUROSEMIDE 60 MG: 10 INJECTION, SOLUTION INTRAMUSCULAR; INTRAVENOUS at 20:30

## 2024-03-29 RX ADMIN — Medication 10 ML: at 23:56

## 2024-03-29 RX ADMIN — HYDROCODONE BITARTRATE AND ACETAMINOPHEN 1 TABLET: 7.5; 325 TABLET ORAL at 10:22

## 2024-03-29 RX ADMIN — FUROSEMIDE 60 MG: 10 INJECTION, SOLUTION INTRAMUSCULAR; INTRAVENOUS at 10:20

## 2024-03-29 RX ADMIN — DOCUSATE SODIUM 50MG AND SENNOSIDES 8.6MG 2 TABLET: 8.6; 5 TABLET, FILM COATED ORAL at 10:21

## 2024-03-29 RX ADMIN — TAMSULOSIN HYDROCHLORIDE 0.4 MG: 0.4 CAPSULE ORAL at 10:22

## 2024-03-29 RX ADMIN — CEFTRIAXONE SODIUM 1000 MG: 1 INJECTION, POWDER, FOR SOLUTION INTRAMUSCULAR; INTRAVENOUS at 12:08

## 2024-03-29 RX ADMIN — Medication 10 ML: at 10:21

## 2024-03-29 RX ADMIN — FERROUS SULFATE TAB 325 MG (65 MG ELEMENTAL FE) 325 MG: 325 (65 FE) TAB at 10:21

## 2024-03-29 RX ADMIN — BUDESONIDE 0.5 MG: 0.5 INHALANT ORAL at 06:31

## 2024-03-29 RX ADMIN — NICOTINE 1 PATCH: 21 PATCH, EXTENDED RELEASE TRANSDERMAL at 10:20

## 2024-03-29 RX ADMIN — ARFORMOTEROL TARTRATE 15 MCG: 15 SOLUTION RESPIRATORY (INHALATION) at 06:31

## 2024-03-29 RX ADMIN — DOCUSATE SODIUM 50MG AND SENNOSIDES 8.6MG 2 TABLET: 8.6; 5 TABLET, FILM COATED ORAL at 20:29

## 2024-03-29 RX ADMIN — FUROSEMIDE 60 MG: 10 INJECTION, SOLUTION INTRAMUSCULAR; INTRAVENOUS at 15:49

## 2024-03-29 RX ADMIN — ARFORMOTEROL TARTRATE 15 MCG: 15 SOLUTION RESPIRATORY (INHALATION) at 20:00

## 2024-03-29 RX ADMIN — HYDROCODONE BITARTRATE AND ACETAMINOPHEN 1 TABLET: 7.5; 325 TABLET ORAL at 20:29

## 2024-03-29 RX ADMIN — DIPHENHYDRAMINE HYDROCHLORIDE 10 MG: 50 INJECTION, SOLUTION INTRAMUSCULAR; INTRAVENOUS at 00:34

## 2024-03-29 RX ADMIN — DOXYCYCLINE 100 MG: 100 INJECTION, POWDER, LYOPHILIZED, FOR SOLUTION INTRAVENOUS at 20:24

## 2024-03-29 RX ADMIN — DOXYCYCLINE 100 MG: 100 INJECTION, POWDER, LYOPHILIZED, FOR SOLUTION INTRAVENOUS at 10:20

## 2024-03-29 RX ADMIN — BUDESONIDE 0.5 MG: 0.5 INHALANT ORAL at 20:00

## 2024-03-29 RX ADMIN — ENOXAPARIN SODIUM 30 MG: 100 INJECTION SUBCUTANEOUS at 10:21

## 2024-03-29 RX ADMIN — ALLOPURINOL 300 MG: 300 TABLET ORAL at 10:22

## 2024-03-29 RX ADMIN — HYDROCODONE BITARTRATE AND ACETAMINOPHEN 1 TABLET: 7.5; 325 TABLET ORAL at 00:16

## 2024-03-29 RX ADMIN — FAMOTIDINE 20 MG: 20 TABLET ORAL at 10:21

## 2024-03-29 NOTE — THERAPY EVALUATION
Acute Care - Speech Language Pathology   Swallow Initial Evaluation  [place]     Patient Name: Clark Layton  : 1940  MRN: [id number]  Today's Date: 3/29/2024               Admit Date: 3/26/2024    Visit Dx:     ICD-10-CM ICD-9-CM   1. Left displaced femoral neck fracture  S72.002A 820.8   2. Fall from chair, initial encounter  W07.XXXA E884.2   3. Dementia without behavioral disturbance, psychotic disturbance, mood disturbance, or anxiety, unspecified dementia severity, unspecified dementia type  F03.90 294.20   4. Impaired mobility and ADLs  Z74.09 V49.89    Z78.9    5. Difficulty in walking  R26.2 719.7   6. Dysphagia, unspecified type  R13.10 787.20     Patient Active Problem List   Diagnosis    Nicotine dependence, cigarettes, with other nicotine-induced disorders    High cholesterol    Gout    Essential hypertension    Medication management    Benign prostatic hyperplasia    Left displaced femoral neck fracture    Hip fracture    History of dementia     Past Medical History:   Diagnosis Date    Essential hypertension     Gout     High cholesterol 2018    Medication management 2018    Nicotine dependence, cigarettes, with other nicotine-induced disorders 2018     Past Surgical History:   Procedure Laterality Date    HIP BIPOLAR REPLACEMENT Left 3/27/2024    Procedure: HIP BIPOLAR ANTERIOR;  Surgeon: Bethany Carlson MD;  Location: [facility];  Service: Orthopedics;  Laterality: Left;     Inpatient Speech Pathology Dysphagia Evaluation        PAIN SCALE: None indicated.    PRECAUTIONS/CONTRAINDICATIONS: Standard    SUSPECTED ABUSE/NEGLECT/EXPLOITATION: None indicated.    SOCIAL/PSYCHOLOGICAL NEEDS/BARRIERS: None indicated.    PAST SOCIAL HISTORY: 83-year-old male lives at home with his wife    PRIOR FUNCTION: On regular diet    PATIENT GOALS/EXPECTATIONS: Return home    HISTORY: 83-year-old male with the above diagnosis referred for speech therapy evaluation assess for swallowing.   Concern for possible aspiration.  CT reports debris in the esophagus and debris in trachea.  Spouse denies difficulty swallowing, denies history of reflux.  No previous speech pathology services are reported.    CURRENT DIET LEVEL: Regular, thin    OBJECTIVE:    TEST ADMINISTERED: Clinical dysphagia evaluation    COGNITION/SAFETY AWARENESS: Impaired    BEHAVIORAL OBSERVATIONS: Alert and cooperative    ORAL MOTOR EXAM: Patient did not follow directions for complete oral motor examination.  Structures and functions appeared adequate in functional tasks.    VOICE QUALITY: Adequate    REFLEX EXAM: Patient demonstrated cough    POSTURE: Sitting upright in bed    FEEDING/SWALLOWING FUNCTION: Assessed with nectar liquid, thin liquids, puréed solids, crunchy solid.    CLINICAL OBSERVATIONS: Nectar liquid by cup with minimal delay, vocal quality remaining clear to cervical auscultation.  Thin liquid by cup with minimal delay, vocal quality remained clear to cervical auscultation.  Patient was noted with delayed cough 30 seconds following.  Thin liquid by straw with minimal delay, patient demonstrated sequential swallows, vocal quality remaining clear to cervical auscultation.  Purée solid with swallow completed with laryngeal elevation noted to palpation.  Crunchy solid with adequate chewing followed by swallow completed clearing the oral cavity.    DYSPHAGIA CRITERIA: Oropharyngeal swallow appears grossly functional for nutritional needs.      FUNCTIONAL ASSESSMENT INSTRUMENT: Patient currently scored a level 6 of 7 on Functional Communication Measures for swallowing indicating a 0% limitation in function.    ASSESSMENT/ PLAN OF CARE:  No direct speech therapy is recommended at this time for dysphagia. Recommend rereferral should patient demonstrate change in status.    RECOMMENDATIONS:   1.   DIET: Regular solids, thin liquid.    2.  POSITION: Positioning fully upright for all p.o. intake and 1 hour following.    3.   COMPENSATORY STRATEGIES: Alternate small bites and small sips of solids and liquids at a slow rate.  Reflux precautions.    4.  Physician may wish further investigation of esophageal phase of swallow.    Pt/responsible party agrees with plan of care and has been informed of all alternatives, risks and benefits.                              Anticipated Discharge Disposition (SLP): home with 24/7 care (03/29/24 1042)                                                               EDUCATION  The patient has been educated in the following areas:   Modified Diet Instruction.              Time Calculation:    Time Calculation- SLP       Row Name 03/29/24 1042             Time Calculation- SLP    SLP Start Time 0900  -TB      SLP Stop Time 1000  -TB      SLP Time Calculation (min) 60 min  -TB      SLP Received On 03/29/24  -TB         Untimed Charges    SLP Eval/Re-eval  ST Eval Oral Pharyng Swallow - 70595  -TB      61625-XY Eval Oral Pharyng Swallow Minutes 60  -TB         Total Minutes    Untimed Charges Total Minutes 60  -TB       Total Minutes 60  -TB                User Key  (r) = Recorded By, (t) = Taken By, (c) = Cosigned By      Initials Name Provider Type    TB Merle Trammell SLP Speech and Language Pathologist                    Therapy Charges for Today       Code Description Service Date Service Provider Modifiers Qty    25774869730 HC ST EVAL ORAL PHARYNG SWALLOW 4 3/29/2024 Merle Trammell SLP GN 1                 EMERSON Brown  3/29/2024

## 2024-03-29 NOTE — PROGRESS NOTES
[facility]     Progress Note    Patient Name: Clark Layton  : 1940  MRN: [id number]  Primary Care Physician:  Honorio Field MD  Date of admission: 3/26/2024    Subjective   No major acute events overnight  Patient had nuclear scan which was unremarkable  Had CT scan which was concerning for atelectasis volume overload and anasarca  No major acute events otherwise overnight  Renal ultrasound was unremarkable  LVH with some concentric hypertrophy    Scheduled Meds:acetaminophen, 1,000 mg, Oral, Q8H  allopurinol, 300 mg, Oral, Daily  arformoterol, 15 mcg, Nebulization, BID - RT  budesonide, 0.5 mg, Nebulization, BID - RT  enoxaparin, 30 mg, Subcutaneous, Daily  famotidine, 20 mg, Oral, Daily  ferrous sulfate, 325 mg, Oral, Daily With Breakfast  furosemide, 60 mg, Intravenous, TID  nicotine, 1 patch, Transdermal, Q24H  senna-docusate sodium, 2 tablet, Oral, BID  sodium chloride, 10 mL, Intravenous, Q12H  sodium chloride, 10 mL, Intravenous, Q12H  tamsulosin, 0.4 mg, Oral, Daily      Continuous Infusions:   PRN Meds:.  acetaminophen **OR** acetaminophen **OR** acetaminophen    acetaminophen    aluminum-magnesium hydroxide-simethicone    senna-docusate sodium **AND** polyethylene glycol **AND** bisacodyl **AND** bisacodyl    haloperidol lactate    HYDROcodone-acetaminophen    HYDROcodone-acetaminophen    ipratropium-albuterol    magnesium hydroxide    melatonin    Morphine **AND** [DISCONTINUED] naloxone    Morphine **AND** naloxone    ondansetron ODT **OR** ondansetron    promethazine **OR** promethazine    [COMPLETED] Insert Peripheral IV **AND** sodium chloride    sodium chloride    sodium chloride    sodium chloride    sodium chloride       Review of Systems  Constitutional:        Weakness tiredness fatigue  Eyes:                       No blurry vision, eye discharge, eye irritation, eye pain  HEENT:                   No acute hair loss, earache and discharge, nasal congestion or discharge,  sore throat, postnasal drip  Respiratory:           No shortness of breath coughing sputum production wheezing hemoptysis pleuritic chest pain  Cardiovascular:     No chest pain, orthopnea, PND, dizziness, palpitation, lower extremity edema  Gastrointestinal:   No nausea vomiting diarrhea abdominal pain constipation  Genitourinary:       No urinary incontinence, hesitancy, frequency, urgency, dysuria  Hematologic:         No bruising, bleeding, pallor, lymphadenopathy  Endocrine:            No coldness, hot flashes, polyuria, abnormal hair growth  Musculoskeletal:    No body pains, aches, arthritic pains, muscle pain ,muscle wasting  Psychiatric:          No low or high mood, anxiety, hallucinations, delusions  Skin.                      No rash, ulcers, bruising, itching  Neurological:        No confusion, headache, focal weakness, numbness, dysphasia    Objective   Objective     Vitals:   Temp:  [97.8 °F (36.6 °C)-98.6 °F (37 °C)] 97.8 °F (36.6 °C)  Heart Rate:  [] 92  Resp:  [18-20] 20  BP: (133-162)/(50-68) 162/57  Flow (L/min):  [6-7] 6  Physical Exam    Constitutional: Awake, alert responsive, conversant, no obvious distress              Psychiatric:  Appropriate affect, cooperative   Neurologic:  Awake alert ,oriented x 3, strength symmetric in all extremities, Cranial Nerves grossly intact to confrontation, speech clear   Eyes:   PERRLA, sclerae anicteric, no conjunctival injection   HEENT:  Moist mucous membranes, no nasal or eye discharge, no throat congestion   Neck:   Supple, no thyromegaly, no lymphadenopathy, trachea midline, no elevated JVD   Respiratory:  Clear to auscultation bilaterally, nonlabored respirations    Cardiovascular: RRR, no murmurs, rubs, or gallops, palpable pedal pulses bilaterally, No bilateral ankle edema   Gastrointestinal: Positive bowel sounds, soft, nontender, nondistended, no organomegaly   Musculoskeletal:  No clubbing or cyanosis to extremities,muscle wasting, joint  swelling, muscle weakness             Skin:                      No rashes, bruising, skin ulcers, petechiae or ecchymosis    Result Review    Result Review:  I have personally reviewed the results from the time of this admission to 3/29/2024 07:53 EDT and agree with these findings:  []  Laboratory  []  Microbiology  []  Radiology  []  EKG/Telemetry   []  Cardiology/Vascular   []  Pathology  []  Old records  []  Other:    Assessment & Plan   Assessment / Plan       Active Hospital Problems:  Active Hospital Problems    Diagnosis     **Hip fracture     History of dementia     Gout     Essential hypertension     Nicotine dependence, cigarettes, with other nicotine-induced disorders      83-year-old male with past medical history of hypertension, hyperuricemia, BPH, CKD stage IIIa 1.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  Noted to have an BÁRBARA with creatinine rise to 2 with chest x-ray showing some diffuse infiltrates with elevated D-dimer.  BÁRBARA most likely due to relative hypotension and hemodynamic changes.  Patient's proBNP normal at 600 urine analysis with many hyaline cast likely in the setting of diuresis.  CT scan concerning for possible volume overload.  Nuclear scan was negative for PE.  Echo with EF of 60%.  Renal ultrasound was negative     Plan:   Will give Lasix 60 mg IV 3 times daily      Electronically signed by Stone Lopez MD, 03/29/24, 7:53 AM EDT.

## 2024-03-29 NOTE — PROGRESS NOTES
Pulmonary / Critical Care Progress Note      Patient Name: Clark Layton  : 1940  MRN: [id number]  Attending:  Mumtaz Adam MD  Date of admission: 3/26/2024    Subjective   Subjective   Follow-up for acute hypoxic respiratory failure    Over past 24 hours:  Doing well this morning  On 6 L nasal cannula  Sitting up in chair  2.15 L urine output  Renal function improving    Review of Systems  General: Denied complaints  Cardiovascular:  Denied complaints  Respiratory: Denied complaints  Gastrointestinal: Denied complaints        Objective   Objective     Vitals:   Temp:  [97.8 °F (36.6 °C)-98.6 °F (37 °C)] 98.6 °F (37 °C)  Heart Rate:  [] 81  Resp:  [18-20] 18  BP: (136-162)/(50-68) 143/54  Flow (L/min):  [6-7] 6    Physical Exam   Vital Signs Reviewed   General:  WDWN, Alert, elderly male, sitting up in chair  HEENT:  PERRL, EOMI.  OP, nares clear  Chest:  good aeration, clear to auscultation bilaterally, no work of breathing noted on 4 L nasal cannula  CV: RRR, no MGR, pulses 2+, equal.  Abd:  Soft, NT, ND, + BS  EXT:  no clubbing, no cyanosis, no edema  Neuro:  A&Ox3, CN grossly intact, no focal deficits.  Skin: No rashes or lesions noted      Result Review    Result Review:  I have personally reviewed the results from the time of this admission to 3/29/2024 13:24 EDT and agree with these findings:  []  Laboratory  []  Microbiology  []  Radiology  []  EKG/Telemetry   []  Cardiology/Vascular   []  Pathology  []  Old records  []  Other:  Most notable findings include:   -     Assessment & Plan   Assessment / Plan     Active Hospital Problems:  Active Hospital Problems    Diagnosis     **Hip fracture     History of dementia     Gout     Essential hypertension     Nicotine dependence, cigarettes, with other nicotine-induced disorders          Impression:  Acute hypoxic and hypercapnic respiratory failure requiring high flow  Concern for pulmonary embolus  Congestive diastolic heart failure  Acute  cardiogenic pulmonary edema  Mild aortic valve stenosis  S/p left hip hemiarthroplasty  Tobacco abuse of cigarettes  Concern for undiagnosed COPD  Alzheimer's dementia  Mechanical fall     Plan:  -Weaned down to 4 L nasal cannula.  Continue to wean supplemental oxygen to maintain SpO2 greater than 90%.  Patient does not wear any home O2  -3/27 CXR demonstrates diffuse interstitial opacities which may represent a component of interstitial edema.  No pleural effusions noted  -Chest CT with bilateral lower lobe effusion, atelectasis.  Pulmonary edema also noted.  There was mentioning of esophageal debris's report as well.  -Given these findings and elevated procalcitonin, will treat for community-acquired pneumonia with ceftriaxone and doxycycline  -Patient had elevated D-dimer, 4.79.  Patient underwent VQ scan done demonstrating no peripheral wedge-shaped defects to suggest presence of underlying pulmonary embolism  -3/28 echo demonstrates an EF of 61 to 65% with grade 1 diastolic dysfunction and mild aortic valve stenosis  -Continue diuresis per nephrology.  Renal function improving.  Continue to monitor renal panel and electrolytes.   -Continue Brovana, Pulmicort, and DuoNebs  -Continue bronchopulmonary hygiene.  Encourage I-S and flutter  -Encourage mobilization.  Out of bed to chair  -PT/OT/SLP on board.  Appreciate assistance  -Ortho following for s/p left Turner hip arthroplasty  -Rest of care per primary    DVT prophylaxis:  Medical and mechanical DVT prophylaxis orders are present.        CODE STATUS:   Medical Intervention Limits: NO intubation (DNI); NO cardioversion  Code Status (Patient has no pulse and is not breathing): No CPR (Do Not Attempt to Resuscitate)  Medical Interventions (Patient has pulse or is breathing): Limited Support      Labs, images, and medications personally reviewed.    Discussed with patient    Electronically signed by Radha Li MD, 03/29/24, 1:24 PM EDT.

## 2024-03-29 NOTE — THERAPY TREATMENT NOTE
Acute Care - Physical Therapy Treatment Note   [place]     Patient Name: Clark Layton  : 1940  MRN: [id number]  Today's Date: 3/29/2024      Visit Dx:     ICD-10-CM ICD-9-CM   1. Left displaced femoral neck fracture  S72.002A 820.8   2. Fall from chair, initial encounter  W07.XXXA E884.2   3. Dementia without behavioral disturbance, psychotic disturbance, mood disturbance, or anxiety, unspecified dementia severity, unspecified dementia type  F03.90 294.20   4. Impaired mobility and ADLs  Z74.09 V49.89    Z78.9    5. Difficulty in walking  R26.2 719.7   6. Dysphagia, unspecified type  R13.10 787.20     Patient Active Problem List   Diagnosis    Nicotine dependence, cigarettes, with other nicotine-induced disorders    High cholesterol    Gout    Essential hypertension    Medication management    Benign prostatic hyperplasia    Left displaced femoral neck fracture    Hip fracture    History of dementia     Past Medical History:   Diagnosis Date    Essential hypertension     Gout     High cholesterol 2018    Medication management 2018    Nicotine dependence, cigarettes, with other nicotine-induced disorders 2018     Past Surgical History:   Procedure Laterality Date    HIP BIPOLAR REPLACEMENT Left 3/27/2024    Procedure: HIP BIPOLAR ANTERIOR;  Surgeon: Bethany Carlson MD;  Location: [facility];  Service: Orthopedics;  Laterality: Left;     PT Assessment (Last 12 Hours)       PT Evaluation and Treatment       Row Name 24 1400          Physical Therapy Time and Intention    Subjective Information complains of;pain  -VK     Document Type therapy note (daily note)  -VK     Mode of Treatment individual therapy;physical therapy  -VK     Patient Effort adequate  -VK       Row Name 24 1400          General Information    Patient Profile Reviewed yes  -VK     Existing Precautions/Restrictions fall;weight bearing  -VK       Row Name 24 1400          Pain    Pain Intervention(s)  Repositioned;Rest;Ambulation/increased activity  -       Row Name 03/29/24 1400          Cognition    Affect/Mental Status (Cognition) confused;low arousal/lethargic  -     Orientation Status (Cognition) oriented to;person  -VK     Personal Safety Interventions fall prevention program maintained;gait belt;nonskid shoes/slippers when out of bed  -VK       Row Name 03/29/24 1400          Mobility    Extremity Weight-bearing Status left lower extremity  -VK     Left Lower Extremity (Weight-bearing Status) weight-bearing as tolerated (WBAT)  -VK       Row Name 03/29/24 1400          Bed Mobility    Bed Mobility sit-supine  -VK     Sit-Supine [place] (Bed Mobility) maximum assist (25% patient effort);2 person assist  -VK     Bed Mobility, Safety Issues cognitive deficits limit understanding;decreased use of arms for pushing/pulling;decreased use of legs for bridging/pushing  -VK     Assistive Device (Bed Mobility) bed rails;draw sheet;head of bed elevated  -VK       Row Name 03/29/24 1400          Transfers    Transfers sit-stand transfer;stand-sit transfer;chair-bed transfer  -       Row Name 03/29/24 1400          Chair-Bed Transfer    Chair-Bed [place] (Transfers) maximum assist (25% patient effort);2 person assist  -VK       Row Name 03/29/24 1400          Sit-Stand Transfer    Sit-Stand [place] (Transfers) maximum assist (25% patient effort);2 person assist  -VK     Assistive Device (Sit-Stand Transfers) walker, front-wheeled  -       Row Name 03/29/24 1400          Stand-Sit Transfer    Stand-Sit [place] (Transfers) maximum assist (25% patient effort);2 person assist  -VK     Assistive Device (Stand-Sit Transfers) walker, front-wheeled  -       Row Name 03/29/24 1400          Gait/Stairs (Locomotion)    Patient was able to Ambulate no, other medical factors prevent ambulation  -VK     Reason Patient was unable to Ambulate Cognitive Deficit/Severe Dementia;Excessive Weakness  -        Row Name 03/29/24 1400          Safety Issues, Functional Mobility    Impairments Affecting Function (Mobility) balance;cognition;endurance/activity tolerance;pain;shortness of breath  -VK       Row Name 03/29/24 1400          Balance    Balance Assessment sit to stand dynamic balance  -VK     Sit to Stand Dynamic Balance maximum assist;verbal cues;2-person assist  -VK     Dynamic Standing Balance maximum assist;2-person assist  -VK     Position/Device Used, Standing Balance walker, front-wheeled  -VK       Row Name             Wound 03/27/24 1400 Left lateral thigh Incision    Wound - Properties Group Placement Date: 03/27/24  -AR Placement Time: 1400  -AR Side: Left  -AR Orientation: lateral  -AR Location: thigh  -AR Primary Wound Type: Incision  -AR    Retired Wound - Properties Group Placement Date: 03/27/24  -AR Placement Time: 1400  -AR Side: Left  -AR Orientation: lateral  -AR Location: thigh  -AR Primary Wound Type: Incision  -AR    Retired Wound - Properties Group Date first assessed: 03/27/24  -AR Time first assessed: 1400  -AR Side: Left  -AR Location: thigh  -AR Primary Wound Type: Incision  -AR      Row Name 03/29/24 1400          Positioning and Restraints    Pre-Treatment Position sitting in chair/recliner  -VK     Post Treatment Position bed  -VK     In Bed fowlers;call light within reach;encouraged to call for assist;exit alarm on;with family/caregiver  -VK       Row Name 03/29/24 1400          Progress Summary (PT)    Progress Toward Functional Goals (PT) progress toward functional goals is gradual  -VK     Daily Progress Summary (PT) Pt agreeable to treatment. Worked on transfers and attempted ambulation, pt was unable to keep his RLE under him and would slide his leg forward, not following commands to bring LE under him. Pt very lethargic and sleeping through some of treatment. Pt continues to benefit from skilled PT to address stated deficits.  -VK               User Key  (r) = Recorded By, (t)  = Taken By, (c) = Cosigned By      Initials Name Provider Type    AR Peter April, RN CSA Registered Nurse    Saira Lopez PTA Physical Therapist Assistant                Bilateral Lower Extremity AAROM  Exercise  Reps  Sets    Short arc quads   10 2   Heel slides  10 2   Ankle pumps  10 2   Straight leg raise  10 2           PT Recommendation and Plan     Progress Summary (PT)  Progress Toward Functional Goals (PT): progress toward functional goals is gradual  Daily Progress Summary (PT): Pt agreeable to treatment. Worked on transfers and attempted ambulation, pt was unable to keep his RLE under him and would slide his leg forward, not following commands to bring LE under him. Pt very lethargic and sleeping through some of treatment. Pt continues to benefit from skilled PT to address stated deficits.   Outcome Measures       Row Name 03/29/24 1600 03/28/24 1300          How much help from another person do you currently need...    Turning from your back to your side while in flat bed without using bedrails? 2  -VK 2  -RULA     Moving from lying on back to sitting on the side of a flat bed without bedrails? 1  -VK 2  -RULA     Moving to and from a bed to a chair (including a wheelchair)? 1  -VK 2  -RULA     Standing up from a chair using your arms (e.g., wheelchair, bedside chair)? 2  -VK 2  -RULA     Climbing 3-5 steps with a railing? 1  -VK 1  -RULA     To walk in hospital room? 1  -VK 1  -RULA     AM-PAC 6 Clicks Score (PT) 8  -VK 10  -RULA     Highest Level of Mobility Goal 3 --> Sit at edge of bed  -VK 4 --> Transfer to chair/commode  -RULA        Functional Assessment    Outcome Measure Options -- AM-PAC 6 Clicks Basic Mobility (PT)  -RULA               User Key  (r) = Recorded By, (t) = Taken By, (c) = Cosigned By      Initials Name Provider Type    Bernabe Harris PT Physical Therapist    Saira Lopez PTA Physical Therapist Assistant                     Time Calculation:    PT Charges       Row Name 03/29/24  1611             Time Calculation    PT Received On 03/29/24  -VK         Timed Charges    67438 - PT Therapeutic Exercise Minutes 14  -VK      29904 - PT Therapeutic Activity Minutes 21  -VK         Total Minutes    Timed Charges Total Minutes 35  -VK       Total Minutes 35  -VK                User Key  (r) = Recorded By, (t) = Taken By, (c) = Cosigned By      Initials Name Provider Type    Saira Lopez, CARLYN Physical Therapist Assistant                  Therapy Charges for Today       Code Description Service Date Service Provider Modifiers Qty    52875411266 HC PT THER PROC EA 15 MIN 3/29/2024 Saira Fletcher, CARLYN GP 1    13850821174 HC PT THERAPEUTIC ACT EA 15 MIN 3/29/2024 Saira Fletcher, CARLYN GP 1            PT G-Codes  Outcome Measure Options: AM-PAC 6 Clicks Daily Activity (OT), Optimal Instrument  AM-PAC 6 Clicks Score (PT): 8  AM-PAC 6 Clicks Score (OT): 12    Saira Fletcher PTA  3/29/2024

## 2024-03-29 NOTE — PLAN OF CARE
Goal Outcome Evaluation:   Calm and cooperative during shift. Crowder catheter in place. Pain controlled at this time. Aquacel dressing to left hip clean, dry, and intact. Wife and son at bedside. No s/s of distress noted. Call light within reach.

## 2024-03-29 NOTE — PROGRESS NOTES
Orthopedic hemiarthroplasty for fracture progress Note    Assessment/Plan appreciate pulmonary input, rehab evaluations, PT/OT, DVT prophylaxis    Status post-left Turner hip arthroplasty: Doing well postoperatively.    Pain Relief: some relief    Continues current post-op course    Activity: up with assistance    Weight Bearing: WBAT     LOS: 0 days     Subjective     Post-Operative Day: 2 post-op me hip arthroplasty  Systemic or Specific Complaints: No Complaints    Objective     Vital signs in last 24 hours:  Vitals:    03/28/24 2240 03/29/24 0615 03/29/24 0631 03/29/24 0635   BP: 152/50 162/57     BP Location: Right arm Right arm     Patient Position: Lying Lying     Pulse:   93 92   Resp: 18 19 20    Temp: 98.2 °F (36.8 °C) 97.8 °F (36.6 °C)     TempSrc: Oral Axillary     SpO2: 93% 96% 94% 94%   Weight:       Height:            General: alert, appears stated age, and cooperative   Neurovascular: Wiggles toes  Capillary refill: Normal   Wound: Dressing remains clean and dry no evidence of infection.   Range of Motion: Limited flexsion and Limited extension   DVT Exam: Calf soft      WBC   Date Value Ref Range Status   03/29/2024 8.83 3.40 - 10.80 10*3/mm3 Final     RBC   Date Value Ref Range Status   03/29/2024 3.10 (L) 4.14 - 5.80 10*6/mm3 Final     Hemoglobin   Date Value Ref Range Status   03/29/2024 10.0 (L) 13.0 - 17.7 g/dL Final     Hematocrit   Date Value Ref Range Status   03/29/2024 30.9 (L) 37.5 - 51.0 % Final     MCV   Date Value Ref Range Status   03/29/2024 99.7 (H) 79.0 - 97.0 fL Final     MCH   Date Value Ref Range Status   03/29/2024 32.3 26.6 - 33.0 pg Final     MCHC   Date Value Ref Range Status   03/29/2024 32.4 31.5 - 35.7 g/dL Final     RDW   Date Value Ref Range Status   03/29/2024 14.4 12.3 - 15.4 % Final     RDW-SD   Date Value Ref Range Status   03/29/2024 51.8 37.0 - 54.0 fl Final     MPV   Date Value Ref Range Status   03/29/2024 11.6 6.0 - 12.0 fL Final     Platelets   Date Value Ref  "Range Status   03/29/2024 150 140 - 450 10*3/mm3 Final     Neutrophil %   Date Value Ref Range Status   03/29/2024 73.5 42.7 - 76.0 % Final     Lymphocyte %   Date Value Ref Range Status   03/29/2024 13.7 (L) 19.6 - 45.3 % Final     Monocyte %   Date Value Ref Range Status   03/29/2024 7.6 5.0 - 12.0 % Final     Eosinophil %   Date Value Ref Range Status   03/29/2024 3.6 0.3 - 6.2 % Final     Basophil %   Date Value Ref Range Status   03/29/2024 0.8 0.0 - 1.5 % Final     Immature Grans %   Date Value Ref Range Status   03/29/2024 0.8 (H) 0.0 - 0.5 % Final     Neutrophils, Absolute   Date Value Ref Range Status   03/29/2024 6.49 1.70 - 7.00 10*3/mm3 Final     Lymphocytes, Absolute   Date Value Ref Range Status   03/29/2024 1.21 0.70 - 3.10 10*3/mm3 Final     Monocytes, Absolute   Date Value Ref Range Status   03/29/2024 0.67 0.10 - 0.90 10*3/mm3 Final     Eosinophils, Absolute   Date Value Ref Range Status   03/29/2024 0.32 0.00 - 0.40 10*3/mm3 Final     Basophils, Absolute   Date Value Ref Range Status   03/29/2024 0.07 0.00 - 0.20 10*3/mm3 Final     Immature Grans, Absolute   Date Value Ref Range Status   03/29/2024 0.07 (H) 0.00 - 0.05 10*3/mm3 Final     nRBC   Date Value Ref Range Status   03/29/2024 0.0 0.0 - 0.2 /100 WBC Final        Basic Metabolic Panel    Sodium Sodium   Date Value Ref Range Status   03/29/2024 138 136 - 145 mmol/L Final   03/28/2024 137 136 - 145 mmol/L Final   03/26/2024 138 136 - 145 mmol/L Final      Potassium Potassium   Date Value Ref Range Status   03/29/2024 3.9 3.5 - 5.2 mmol/L Final   03/28/2024 4.7 3.5 - 5.2 mmol/L Final   03/26/2024 4.4 3.5 - 5.2 mmol/L Final      Chloride Chloride   Date Value Ref Range Status   03/29/2024 97 (L) 98 - 107 mmol/L Final   03/28/2024 100 98 - 107 mmol/L Final   03/26/2024 97 (L) 98 - 107 mmol/L Final      Bicarbonate No results found for: \"PLASMABICARB\"   BUN BUN   Date Value Ref Range Status   03/29/2024 35 (H) 8 - 23 mg/dL Final   03/28/2024 35 " "(H) 8 - 23 mg/dL Final   03/26/2024 21 8 - 23 mg/dL Final      Creatinine Creatinine   Date Value Ref Range Status   03/29/2024 1.54 (H) 0.76 - 1.27 mg/dL Final   03/28/2024 2.02 (H) 0.76 - 1.27 mg/dL Final   03/26/2024 1.23 0.76 - 1.27 mg/dL Final      Calcium Calcium   Date Value Ref Range Status   03/29/2024 8.5 (L) 8.6 - 10.5 mg/dL Final   03/28/2024 8.1 (L) 8.6 - 10.5 mg/dL Final   03/26/2024 9.4 8.6 - 10.5 mg/dL Final      Glucose      No components found for: \"GLUCOSE.*\"      CT Chest Without Contrast Diagnostic   Final Result   1. Findings consistent with volume overload/third spacing including   interstitial pulmonary edema and small bilateral pleural effusions.   2. Dependent subpleural opacities in bilateral upper and lower lobes,   presumed atelectasis however correlate for clinical signs of developing   infection.   3. Mildly patulous debris-filled esophagus noted to the level of the   thoracic inlet. Debris also noted within the lower trachea and mainstem   bronchi. Patient is at high risk for aspiration. Recommend aspiration   precautions.       Recommend continuing annual low-dose chest CT lung screening on an   outpatient basis. Previous screening comparison 8/16/2022.                       Electronically Signed ByUna Oneil MD On:3/28/2024 4:14 PM          US Renal Bilateral   Final Result   1. Normal size kidneys without hydronephrosis.   2. Small subcentimeter cyst along the cortex of the left kidney, similar   to 2020.       Electronically Signed ByAnny Girard MD On:3/28/2024 1:50 PM          NM Lung Scan Perfusion Particulate   Final Result   1. No peripheral wedge-shaped defects to suggest presence of underlying   pulmonary embolism. Very low probability study per perfusion only PIOPED   2 criteria.           Electronically Signed ByAnny Girard MD On:3/28/2024 1:59 PM          XR Chest 1 View   Final Result   Impression:   Increased diffuse interstitial opacities which may " represent a component   of interstitial edema. No focal consolidation or pleural effusion.           Electronically Signed By-Kvng Beth MD On:3/27/2024 6:00 PM          XR Hip With or Without Pelvis 2 - 3 View Left   Final Result   Impression:   1.  Status post left hip arthroplasty.   2.  No immediate postoperative complication is identified.           Electronically Signed By-Rafael Pimentel MD On:3/27/2024 3:39 PM          FL Surgery Fluoro   Final Result      CT Pelvis Without Contrast   Final Result   Impression:   1.  Nondisplaced left femoral neck fracture   2.  Degenerative changes are noted involving the sacroiliac joints and   lower lumbar spine.   3.  Colonic diverticulosis.   4.  Enlarged prostate   5.  Left inguinal hernia containing fat.               Electronically Signed By-Aldo العراقي MD On:3/26/2024 6:15 PM          XR Chest 1 View   Final Result   No acute cardiopulmonary process identified.           Electronically Signed By-Osito Dutton MD On:3/26/2024 4:32 PM          XR Hip With or Without Pelvis 2 - 3 View Left   Final Result   1.  There is appearance to the left femur that is concerning for femoral   neck fracture with suggested foreshortening of the femoral neck.   Additional imaging may be helpful to better assess this.           Electronically Signed By-Aldo العراقي MD On:3/26/2024 4:34 PM

## 2024-03-29 NOTE — THERAPY TREATMENT NOTE
Patient Name: Clark Layton  : 1940    MRN: [id number]                              Today's Date: 3/29/2024       Admit Date: 3/26/2024    Visit Dx:     ICD-10-CM ICD-9-CM   1. Left displaced femoral neck fracture  S72.002A 820.8   2. Fall from chair, initial encounter  W07.XXXA E884.2   3. Dementia without behavioral disturbance, psychotic disturbance, mood disturbance, or anxiety, unspecified dementia severity, unspecified dementia type  F03.90 294.20   4. Impaired mobility and ADLs  Z74.09 V49.89    Z78.9    5. Difficulty in walking  R26.2 719.7   6. Dysphagia, unspecified type  R13.10 787.20     Patient Active Problem List   Diagnosis    Nicotine dependence, cigarettes, with other nicotine-induced disorders    High cholesterol    Gout    Essential hypertension    Medication management    Benign prostatic hyperplasia    Left displaced femoral neck fracture    Hip fracture    History of dementia     Past Medical History:   Diagnosis Date    Essential hypertension     Gout     High cholesterol 2018    Medication management 2018    Nicotine dependence, cigarettes, with other nicotine-induced disorders 2018     Past Surgical History:   Procedure Laterality Date    HIP BIPOLAR REPLACEMENT Left 3/27/2024    Procedure: HIP BIPOLAR ANTERIOR;  Surgeon: Bethany Carlson MD;  Location: [facility];  Service: Orthopedics;  Laterality: Left;      General Information       Row Name 24 1309          OT Time and Intention    Document Type therapy note (daily note) (P)   -MC     Mode of Treatment individual therapy;occupational therapy (P)   -MC               User Key  (r) = Recorded By, (t) = Taken By, (c) = Cosigned By      Initials Name Provider Type    Wanye Pitts, CHANDLER Student OT Student                     Mobility/ADL's       Row Name 24 1311          Bed Mobility    Bed Mobility supine-sit (P)   -MC     All Activities, [place] (Bed Mobility) maximum assist (25% patient  effort) (P)   -     Bed Mobility, Safety Issues cognitive deficits limit understanding;decreased use of arms for pushing/pulling;decreased use of legs for bridging/pushing (P)   -     Assistive Device (Bed Mobility) bed rails;draw sheet;head of bed elevated (P)   -       Row Name 03/29/24 1310          Transfers    Transfers sit-stand transfer;stand-sit transfer (P)   -     Comment, (Transfers) Patient completed 5 STS while seated EOB. Patient was educated on BLE placement before standing to help increase base of support. Patient was also educated on proper BUE placements when ascending/descending to ensure safe transfer techniques as well as correct hand placement on RW. He required max verbal and tactile cues to ensure safe techniques when completing STS and fx'l mobility.  Patient was MaxA x1 when completing the 4 STS and ModA x2 when completing transfer from EOB to recliner. Additional education required d/t limited understanding secondary to hx of dementia. (P)   -       Row Name 03/29/24 1310          Bed-Chair Transfer    Bed-Chair [place] (Transfers) verbal cues;moderate assist (50% patient effort);2 person assist (P)   -     Assistive Device (Bed-Chair Transfers) walker, front-wheeled (P)   -       Row Name 03/29/24 1310          Sit-Stand Transfer    Sit-Stand [place] (Transfers) maximum assist (25% patient effort) (P)   -     Assistive Device (Sit-Stand Transfers) walker, front-wheeled (P)   -       Row Name 03/29/24 1310          Functional Mobility    Functional Mobility- Ind. Level moderate assist (50% patient effort);2 person assist required (P)   -       Row Name 03/29/24 1310          Mobility    Extremity Weight-bearing Status left lower extremity (P)   -     Left Lower Extremity (Weight-bearing Status) weight-bearing as tolerated (WBAT) (P)   -               User Key  (r) = Recorded By, (t) = Taken By, (c) = Cosigned By      Initials Name Provider Type      Wayne Navas, OT Student OT Student                   Obj/Interventions       [facility] Name 03/29/24 1318          Motor Skills    Motor Skills functional endurance (P)   -     Functional Endurance Fair-, rest breaks between STS. (P)   -[facility] 03/29/24 1318          Balance    Balance Assessment sitting static balance;standing dynamic balance (P)   -     Static Sitting Balance supervision (P)   -     Position, Sitting Balance unsupported;sitting edge of bed (P)   -     Dynamic Standing Balance maximum assist;1-person assist;moderate assist;2-person assist (P)   -     Position/Device Used, Standing Balance supported;walker, front-wheeled (P)   -     Balance Interventions sit to stand;dynamic;standing;occupation based/functional task (P)   -     Comment, Balance Patient was able to tolerate sitting EOB for ~15 minutes while completing fx'l transfer training. Patient educated on BLE placement prior to standing to help increase base of support. (P)   -               User Key  (r) = Recorded By, (t) = Taken By, (c) = Cosigned By      Initials Name Provider Type     Wayne Navas, OT Student OT Student                   Goals/Plan    No documentation.                  Clinical Impression       [facility] Name 03/29/24 1321          Pain Assessment    Additional Documentation Pain Scale: FACES Pre/Post-Treatment (Group) (P)   -MC       Row Name 03/29/24 1321          Pain Scale: FACES Pre/Post-Treatment    Pain: FACES Scale, Pretreatment 0-->no hurt (P)   -     Posttreatment Pain Rating 0-->no hurt (P)   -MC       Row Name 03/29/24 1321          Plan of Care Review    Plan of Care Reviewed With patient;spouse (P)   -     Progress no change (P)   -     Outcome Evaluation Patient agreeable to fx'l transfer training this session. He completed 5 STS requiring max verbal and tactile cues to ensure safe transfer techniques. Additional information required to ensure understanding of technique to help  decrease risk of falling. He would benefit from continued skilled OT services to maximize independence with ADLs and functional transfers. (P)   -       Row Name 03/29/24 1321          Vital Signs    O2 Delivery Pre Treatment hi-flow (P)   -     O2 Delivery Intra Treatment hi-flow (P)   -     O2 Delivery Post Treatment hi-flow (P)   -       Row Name 03/29/24 1321          Positioning and Restraints    Pre-Treatment Position in bed (P)   -MC     Post Treatment Position chair (P)   -MC     In Chair reclined;call light within reach;encouraged to call for assist;exit alarm on;with family/caregiver (P)   -               User Key  (r) = Recorded By, (t) = Taken By, (c) = Cosigned By      Initials Name Provider Type    Wayne Pitts, OT Student OT Student                   Outcome Measures       Row Name 03/29/24 1336          How much help from another is currently needed...    Putting on and taking off regular lower body clothing? 1 (P)   -MC     Bathing (including washing, rinsing, and drying) 1 (P)   -MC     Toileting (which includes using toilet bed pan or urinal) 1 (P)   -MC     Putting on and taking off regular upper body clothing 2 (P)   -MC     Taking care of personal grooming (such as brushing teeth) 3 (P)   -MC     Eating meals 4 (P)   -MC     AM-PAC 6 Clicks Score (OT) 12 (P)   -       Row Name 03/29/24 1022          How much help from another person do you currently need...    Turning from your back to your side while in flat bed without using bedrails? 2  -AW     Moving from lying on back to sitting on the side of a flat bed without bedrails? 2  -AW     Moving to and from a bed to a chair (including a wheelchair)? 2  -AW     Standing up from a chair using your arms (e.g., wheelchair, bedside chair)? 2  -AW     Climbing 3-5 steps with a railing? 1  -AW     To walk in hospital room? 1  -AW     AM-PAC 6 Clicks Score (PT) 10  -AW     Highest Level of Mobility Goal 4 --> Transfer to chair/commode   -AW       Row Name 03/29/24 1336          Functional Assessment    Outcome Measure Options AM-PAC 6 Clicks Daily Activity (OT);Optimal Instrument (P)   -MC       Row Name 03/29/24 1336          Optimal Instrument    Optimal Instrument Optimal - 3 (P)   -MC     Bending/Stooping 4 (P)   -MC     Standing 4 (P)   -MC     Reaching 2 (P)   -MC     From the list, choose the 3 activities you would most like to be able to do without any difficulty Bending/stooping;Standing;Reaching (P)   -     Total Score Optimal - 3 10 (P)   -MC               User Key  (r) = Recorded By, (t) = Taken By, (c) = Cosigned By      Initials Name Provider Type    Latricia De, RN Registered Nurse    Wayne Pitts, OT Student OT Student                    Occupational Therapy Education       Title: PT OT SLP Therapies (Done)       Topic: Occupational Therapy (Done)       Point: ADL training (Done)       Description:   Instruct learner(s) on proper safety adaptation and remediation techniques during self care or transfers.   Instruct in proper use of assistive devices.                  Learning Progress Summary             Patient Acceptance, E, VU by  at 3/28/2024 1140                         Point: Home exercise program (Done)       Description:   Instruct learner(s) on appropriate technique for monitoring, assisting and/or progressing therapeutic exercises/activities.                  Learning Progress Summary             Patient Acceptance, E, VU by  at 3/28/2024 1140                         Point: Precautions (Done)       Description:   Instruct learner(s) on prescribed precautions during self-care and functional transfers.                  Learning Progress Summary             Patient Acceptance, E, VU by  at 3/28/2024 1140                         Point: Body mechanics (Done)       Description:   Instruct learner(s) on proper positioning and spine alignment during self-care, functional mobility activities and/or exercises.                   Learning Progress Summary             Patient Acceptance, E, VU by  at 3/28/2024 1140                                         User Key       Initials Effective Dates Name Provider Type Discipline     06/16/21 -  Anna Houston OT Occupational Therapist OT                  OT Recommendation and Plan     Plan of Care Review  Plan of Care Reviewed With: (P) patient, spouse  Progress: (P) no change  Outcome Evaluation: (P) Patient agreeable to fx'l transfer training this session. He completed 5 STS requiring max verbal and tactile cues to ensure safe transfer techniques. Additional information required to ensure understanding of technique to help decrease risk of falling. He would benefit from continued skilled OT services to maximize independence with ADLs and functional transfers.     Time Calculation:         Time Calculation- OT       Row Name 03/29/24 1336             Time Calculation- OT    OT Received On 03/29/24 (P)   -      OT Goal Re-Cert Due Date 04/06/24 (P)   -         Timed Charges    80953 - OT Therapeutic Activity Minutes 25 (P)   -         Total Minutes    Timed Charges Total Minutes 25 (P)   -       Total Minutes 25 (P)   -                User Key  (r) = Recorded By, (t) = Taken By, (c) = Cosigned By      Initials Name Provider Type    Wayne Pitts OT Student OT Student                  Therapy Charges for Today       Code Description Service Date Service Provider Modifiers Qty    [id number] HC OT THERAPEUTIC ACT EA 15 MIN 3/29/2024 Wayne Navas OT Student GO 2                 CHANDLER [place]  3/29/2024

## 2024-03-29 NOTE — PLAN OF CARE
Goal Outcome Evaluation:  Plan of Care Reviewed With: patient        Progress: no change  Outcome Evaluation: Pt. VSS, remains confused at times. Pt. son at bedside, Hospitalist PA kept informed throughout the shift of orientation. Crowder remains in place, pt. on 6L per high flow NC. Continous pulse O2 monitoring in place. Norco given once this shift for pain control.

## 2024-03-29 NOTE — PLAN OF CARE
Goal Outcome Evaluation:  Plan of Care Reviewed With: patient, spouse      DYSPHAGIA CRITERIA: Oropharyngeal swallow appears grossly functional for nutritional needs.      FUNCTIONAL ASSESSMENT INSTRUMENT: Patient currently scored a level 6 of 7 on Functional Communication Measures for swallowing indicating a 0% limitation in function.    ASSESSMENT/ PLAN OF CARE:  No direct speech therapy is recommended at this time for dysphagia. Recommend rereferral should patient demonstrate change in status.    RECOMMENDATIONS:   1.   DIET: Regular solids, thin liquid.    2.  POSITION: Positioning fully upright for all p.o. intake and 1 hour following.    3.  COMPENSATORY STRATEGIES: Alternate small bites and small sips of solids and liquids at a slow rate.  Reflux precautions.    4.  Physician may wish further investigation of esophageal phase of swallow.            Anticipated Discharge Disposition (SLP): home with 24/7 care

## 2024-03-30 LAB
ALBUMIN SERPL-MCNC: 3.2 G/DL (ref 3.5–5.2)
ANION GAP SERPL CALCULATED.3IONS-SCNC: 6.5 MMOL/L (ref 5–15)
BASOPHILS # BLD AUTO: 0.05 10*3/MM3 (ref 0–0.2)
BASOPHILS NFR BLD AUTO: 0.5 % (ref 0–1.5)
BUN SERPL-MCNC: 32 MG/DL (ref 8–23)
BUN/CREAT SERPL: 20.8 (ref 7–25)
CALCIUM SPEC-SCNC: 8.7 MG/DL (ref 8.6–10.5)
CHLORIDE SERPL-SCNC: 94 MMOL/L (ref 98–107)
CO2 SERPL-SCNC: 37.5 MMOL/L (ref 22–29)
CREAT SERPL-MCNC: 1.54 MG/DL (ref 0.76–1.27)
DEPRECATED RDW RBC AUTO: 51.4 FL (ref 37–54)
EGFRCR SERPLBLD CKD-EPI 2021: 45 ML/MIN/1.73
EOSINOPHIL # BLD AUTO: 0.03 10*3/MM3 (ref 0–0.4)
EOSINOPHIL NFR BLD AUTO: 0.3 % (ref 0.3–6.2)
ERYTHROCYTE [DISTWIDTH] IN BLOOD BY AUTOMATED COUNT: 13.9 % (ref 12.3–15.4)
GLUCOSE SERPL-MCNC: 132 MG/DL (ref 65–99)
HCT VFR BLD AUTO: 33.4 % (ref 37.5–51)
HGB BLD-MCNC: 10.1 G/DL (ref 13–17.7)
IMM GRANULOCYTES # BLD AUTO: 0.07 10*3/MM3 (ref 0–0.05)
IMM GRANULOCYTES NFR BLD AUTO: 0.7 % (ref 0–0.5)
LYMPHOCYTES # BLD AUTO: 0.86 10*3/MM3 (ref 0.7–3.1)
LYMPHOCYTES NFR BLD AUTO: 9.2 % (ref 19.6–45.3)
MCH RBC QN AUTO: 30.5 PG (ref 26.6–33)
MCHC RBC AUTO-ENTMCNC: 30.2 G/DL (ref 31.5–35.7)
MCV RBC AUTO: 100.9 FL (ref 79–97)
MONOCYTES # BLD AUTO: 0.76 10*3/MM3 (ref 0.1–0.9)
MONOCYTES NFR BLD AUTO: 8.1 % (ref 5–12)
NEUTROPHILS NFR BLD AUTO: 7.61 10*3/MM3 (ref 1.7–7)
NEUTROPHILS NFR BLD AUTO: 81.2 % (ref 42.7–76)
NRBC BLD AUTO-RTO: 0 /100 WBC (ref 0–0.2)
PHOSPHATE SERPL-MCNC: 4.2 MG/DL (ref 2.5–4.5)
PLATELET # BLD AUTO: 160 10*3/MM3 (ref 140–450)
PMV BLD AUTO: 11.5 FL (ref 6–12)
POTASSIUM SERPL-SCNC: 3.6 MMOL/L (ref 3.5–5.2)
RBC # BLD AUTO: 3.31 10*6/MM3 (ref 4.14–5.8)
SODIUM SERPL-SCNC: 138 MMOL/L (ref 136–145)
WBC NRBC COR # BLD AUTO: 9.38 10*3/MM3 (ref 3.4–10.8)

## 2024-03-30 PROCEDURE — 25010000002 ENOXAPARIN PER 10 MG: Performed by: INTERNAL MEDICINE

## 2024-03-30 PROCEDURE — 97110 THERAPEUTIC EXERCISES: CPT

## 2024-03-30 PROCEDURE — 99233 SBSQ HOSP IP/OBS HIGH 50: CPT | Performed by: INTERNAL MEDICINE

## 2024-03-30 PROCEDURE — 99232 SBSQ HOSP IP/OBS MODERATE 35: CPT | Performed by: INTERNAL MEDICINE

## 2024-03-30 PROCEDURE — 94664 DEMO&/EVAL PT USE INHALER: CPT

## 2024-03-30 PROCEDURE — 94799 UNLISTED PULMONARY SVC/PX: CPT

## 2024-03-30 PROCEDURE — 97530 THERAPEUTIC ACTIVITIES: CPT

## 2024-03-30 PROCEDURE — 25010000002 FUROSEMIDE PER 20 MG: Performed by: STUDENT IN AN ORGANIZED HEALTH CARE EDUCATION/TRAINING PROGRAM

## 2024-03-30 PROCEDURE — 80069 RENAL FUNCTION PANEL: CPT | Performed by: INTERNAL MEDICINE

## 2024-03-30 PROCEDURE — 94761 N-INVAS EAR/PLS OXIMETRY MLT: CPT

## 2024-03-30 PROCEDURE — 85025 COMPLETE CBC W/AUTO DIFF WBC: CPT | Performed by: ORTHOPAEDIC SURGERY

## 2024-03-30 RX ORDER — POTASSIUM CHLORIDE 750 MG/1
40 CAPSULE, EXTENDED RELEASE ORAL ONCE
Status: COMPLETED | OUTPATIENT
Start: 2024-03-30 | End: 2024-03-30

## 2024-03-30 RX ADMIN — BUDESONIDE 0.5 MG: 0.5 INHALANT ORAL at 06:50

## 2024-03-30 RX ADMIN — FERROUS SULFATE TAB 325 MG (65 MG ELEMENTAL FE) 325 MG: 325 (65 FE) TAB at 10:00

## 2024-03-30 RX ADMIN — FUROSEMIDE 60 MG: 10 INJECTION, SOLUTION INTRAMUSCULAR; INTRAVENOUS at 15:57

## 2024-03-30 RX ADMIN — FAMOTIDINE 20 MG: 20 TABLET ORAL at 10:00

## 2024-03-30 RX ADMIN — FUROSEMIDE 60 MG: 10 INJECTION, SOLUTION INTRAMUSCULAR; INTRAVENOUS at 21:36

## 2024-03-30 RX ADMIN — TAMSULOSIN HYDROCHLORIDE 0.4 MG: 0.4 CAPSULE ORAL at 10:00

## 2024-03-30 RX ADMIN — POTASSIUM CHLORIDE 40 MEQ: 10 CAPSULE, COATED, EXTENDED RELEASE ORAL at 10:00

## 2024-03-30 RX ADMIN — Medication 10 ML: at 21:38

## 2024-03-30 RX ADMIN — ARFORMOTEROL TARTRATE 15 MCG: 15 SOLUTION RESPIRATORY (INHALATION) at 06:50

## 2024-03-30 RX ADMIN — Medication 10 ML: at 10:03

## 2024-03-30 RX ADMIN — BUDESONIDE 0.5 MG: 0.5 INHALANT ORAL at 18:55

## 2024-03-30 RX ADMIN — ENOXAPARIN SODIUM 30 MG: 100 INJECTION SUBCUTANEOUS at 10:00

## 2024-03-30 RX ADMIN — FUROSEMIDE 60 MG: 10 INJECTION, SOLUTION INTRAMUSCULAR; INTRAVENOUS at 10:00

## 2024-03-30 RX ADMIN — HYDROCODONE BITARTRATE AND ACETAMINOPHEN 1 TABLET: 7.5; 325 TABLET ORAL at 10:40

## 2024-03-30 RX ADMIN — Medication 10 ML: at 21:37

## 2024-03-30 RX ADMIN — ALLOPURINOL 300 MG: 300 TABLET ORAL at 10:02

## 2024-03-30 RX ADMIN — NICOTINE 1 PATCH: 21 PATCH, EXTENDED RELEASE TRANSDERMAL at 10:00

## 2024-03-30 RX ADMIN — DOCUSATE SODIUM 50MG AND SENNOSIDES 8.6MG 2 TABLET: 8.6; 5 TABLET, FILM COATED ORAL at 10:00

## 2024-03-30 RX ADMIN — ARFORMOTEROL TARTRATE 15 MCG: 15 SOLUTION RESPIRATORY (INHALATION) at 18:55

## 2024-03-30 NOTE — PROGRESS NOTES
[facility]   Hospitalist Progress Note  Date: 3/29/2024  Patient Name: Clark Layton  : 1940  MRN: [id number]  Date of admission: 3/26/2024      Subjective   Subjective     Chief Complaint: Fall at home and left hip pain.    Summary:   Clark Layton is a 83 y.o. male with past medical history of hypertension, Alzheimer's hyperlipidemia, BPH, nicotine dependence, and GERD presented to the ED after a fall resulting in left hip injury.  Patient has advanced dementia so history was given by wife at bedside.  As per wife he was sitting on a stool on the kitchen island when he fell asleep and fell to the floor.  Patient with severe pain afterwards and was then transferred to the ED for further evaluation.  In the ED patient was hypoxic on arrival with remaining vitals being within normal limits.  Labs show that he had mild leukocytosis with remaining labs being relatively unremarkable.  X-ray of the hip showed a left femoral neck fracture with CT confirming nondisplaced left femoral neck fracture.  Orthopedic surgery was consulted and agreed to see the patient in the morning.  Patient admitted for further evaluation and treatment .  Patient had left hip anterior arthroplasty on .  Due to worsening kidney function and hypoxemia nephrology and pulmonary consulted.    Interval Followup:   Resting, d/w son; has been calmer today        Objective   Objective     Vitals:   Temp:  [97.8 °F (36.6 °C)-99.1 °F (37.3 °C)] 99.1 °F (37.3 °C)  Heart Rate:  [77-93] 88  Resp:  [18-20] 18  BP: (138-162)/(50-60) 138/54  Flow (L/min):  [6] 6  Physical Exam    Constitutional: resting comfortably in NAD   HENT: NCAT   Neck: trachea midline   Respiratory: no stridor   Cardiovascular: RRR   Gastrointestinal: s/nd/nt+bs   Skin: No rashes .  Genitourinary.  Clear urine via Crowder catheter    Result Review    Result Review:  I have personally reviewed the results for the past 24 hours and agree with these findings:  [x]   Laboratory  []  Microbiology  [x]  Radiology  [x]  EKG/Telemetry normal sinus rhythm rate of 81.  QT interval 0.44  []  Cardiology/Vascular   []  Pathology  [x]  Old records  [x]  Other: Medications    Assessment & Plan   Assessment / Plan     Assessment:  Fall at home from sitting position with left hip pain.  Left displaced femoral neck fracture.  S/p left hip bipolar anterior arthroplasty on March 27.  Acute metabolic encephalopathy.  Hypoxia.  No home oxygen use.  Worsening  Possibly undiagnosed COPD  Tobacco use disorder.  Hypertension now soft blood pressure.  Gout.  Hyperlipidemia.  Alzheimer's dementia.  BPH.  Hematuria due to Crowder catheter.  Leukocytosis.  Likely reactive.  Acute kidney injury baseline creatinine around 1.3.  Objectively not clear.  Elevated D dimer in the postop patient.  Anemia.  Postop and from IV hydration.        Plan:  Continue supplemental oxygen to keep sats more than 90%.  Lung perfusion scan low probability for PE  CT chest noted  2D echo noted with diastolic dysfunction.  EF of 61%  Renal ultrasound negative for hydronephrosis  CPK mildly elevated likely from surgery  Oral and IV narcotics along  for pain control on as-needed basis.  Antibiotics started for possible pneumonia  Nebulizer treatment.  Bronchodilator and bronchopulmonary hygiene protocol.  Incentive spirometry and flutter valve.  As needed Haldol for agitation  Hold home Norvasc and diuretic  Continue home Flomax and allopurinol.  Appreciate orthopedic input.  Nicotine patch.  Bowel regimen.  Speech therapy to evaluate for possible aspiration  PT OT  Continue telemetry for now  Discussed abx with pulm    DVT prophylaxis:  Medical and mechanical DVT prophylaxis orders are present.        CODE STATUS:   Medical Intervention Limits: NO intubation (DNI); NO cardioversion  Code Status (Patient has no pulse and is not breathing): No CPR (Do Not Attempt to Resuscitate)  Medical Interventions (Patient has pulse or is  breathing): Limited Support      Electronically signed by Mumtaz Adam MD, 03/29/24, 9:34 PM EDT.

## 2024-03-30 NOTE — PLAN OF CARE
Goal Outcome Evaluation:      Pt conf but can follow commands. Pt has been calm and cooperative this shift. Medicated for pain x1. Family remains at bedside at all times. Will continue POC.

## 2024-03-30 NOTE — PROGRESS NOTES
[facility]   Hospitalist Progress Note  Date: 3/30/2024  Patient Name: Clark Layton  : 1940  MRN: [id number]  Date of admission: 3/26/2024      Subjective   Subjective     Chief Complaint: Fall at home and left hip pain.    Summary:   Clark Layton is a 83 y.o. male with past medical history of hypertension, Alzheimer's hyperlipidemia, BPH, nicotine dependence, and GERD presented to the ED after a fall resulting in left hip injury.  Patient has advanced dementia so history was given by wife at bedside.  As per wife he was sitting on a stool on the kitchen island when he fell asleep and fell to the floor.  Patient with severe pain afterwards and was then transferred to the ED for further evaluation.  In the ED patient was hypoxic on arrival with remaining vitals being within normal limits.  Labs show that he had mild leukocytosis with remaining labs being relatively unremarkable.  X-ray of the hip showed a left femoral neck fracture with CT confirming nondisplaced left femoral neck fracture.  Orthopedic surgery was consulted and agreed to see the patient in the morning.  Patient admitted for further evaluation and treatment .  Patient had left hip anterior arthroplasty on .  Due to worsening kidney function and hypoxemia nephrology and pulmonary consulted.    Interval Followup:   Nursing staff is at bedside.  Issues reported overnight.  Patient voices no complaints.  His wife is at bedside.      Objective   Objective     Vitals:   Temp:  [97.5 °F (36.4 °C)-99.1 °F (37.3 °C)] 98.7 °F (37.1 °C)  Heart Rate:  [77-93] 93  Resp:  [18] 18  BP: (133-152)/(50-61) 152/61  Flow (L/min):  [2-6] 2  Physical Exam    Constitutional: resting comfortably in NAD   HENT: NCAT   Neck: trachea midline   Respiratory: no stridor   Cardiovascular: RRR   Gastrointestinal: s/nd/nt+bs   Skin: No rashes .  Genitourinary.  Clear urine via Crowder catheter    Result Review    Result Review:  I have personally reviewed the  results for the past 24 hours and agree with these findings:  [x]  Laboratory  []  Microbiology  [x]  Radiology  [x]  EKG/Telemetry normal sinus rhythm rate of 81.  QT interval 0.44  []  Cardiology/Vascular   []  Pathology  [x]  Old records  [x]  Other: Medications    Assessment & Plan   Assessment / Plan     Assessment:  Fall at home from sitting position with left hip pain.  Left displaced femoral neck fracture.  S/p left hip bipolar anterior arthroplasty on March 27.  Acute metabolic encephalopathy.  Hypoxia.  No home oxygen use.  Clinically improved down to 2 L/min on this morning.    Acute volume overload  Possibly undiagnosed COPD  Tobacco use disorder.  Hypertension  Gout.  Hyperlipidemia.  Alzheimer's dementia.  BPH.  Hematuria due to Crowder catheter.  Leukocytosis.  Likely reactive.  Appears resolved.  Acute kidney injury baseline creatinine around 1.3.  Creatinine improving  Elevated D dimer in the postop patient.  Anemia.  Postop and from IV hydration.  Today she is stable.        Plan:  Continue supplemental oxygen to keep sats more than 90%.  Lung perfusion scan low probability for PE  CT chest noted findings concerning for volume overload  Continue IV Lasix as per nephrology  2D echo noted with diastolic dysfunction.  EF of 61%  Renal ultrasound negative for hydronephrosis  CPK mildly elevated likely from surgery  Oral and IV narcotics  for pain control on as-needed basis.  Antibiotics started for possible pneumonia  Nebulizer treatment.  Bronchodilator and bronchopulmonary hygiene protocol.  Incentive spirometry and flutter valve.  As needed Haldol for agitation  Norvasc on hold  Continue home Flomax and allopurinol.  Appreciate orthopedic input.  Nicotine patch.  Bowel regimen.  Speech therapy to evaluate for possible aspiration  PT OT  Continue telemetry for now  Discussed abx with pulm    DVT prophylaxis:  Medical and mechanical DVT prophylaxis orders are present.        CODE STATUS:   Medical  Intervention Limits: NO intubation (DNI); NO cardioversion  Code Status (Patient has no pulse and is not breathing): No CPR (Do Not Attempt to Resuscitate)  Medical Interventions (Patient has pulse or is breathing): Limited Support      Electronically signed by Sebastian Oswald MD, 03/30/24, 9:01 AM EDT.

## 2024-03-30 NOTE — PROGRESS NOTES
Pulmonary / Critical Care Progress Note      Patient Name: Clark Layton  : 1940  MRN: [id number]  Attending:  Sebastian Oswald, *  Date of admission: 3/26/2024    Subjective   Subjective   Follow-up for acute hypoxic respiratory failure    Getting better  Down to 2 L of oxygen  Less short of breath  Diuresing well  Try to move around a little bit      Objective   Objective     Vitals:   Temp:  [97.5 °F (36.4 °C)-99.1 °F (37.3 °C)] 98.4 °F (36.9 °C)  Heart Rate:  [77-88] 81  Resp:  [18] 18  BP: (133-143)/(50-60) 138/54  Flow (L/min):  [2-6] 2    Physical Exam   Vital Signs Reviewed   General:  WDWN, Alert, elderly male, sitting up in chair  HEENT:  PERRL, EOMI.  OP, nares clear  Chest:  good aeration, clear to auscultation bilaterally, no work of breathing noted on 4 L nasal cannula  CV: RRR, no MGR, pulses 2+, equal.  Abd:  Soft, NT, ND, + BS  EXT:  no clubbing, no cyanosis, no edema  Neuro:  A&Ox3, CN grossly intact, no focal deficits.  Skin: No rashes or lesions noted      Result Review    Result Review:  I have personally reviewed the results from the time of this admission to 3/30/2024 06:53 EDT and agree with these findings:  [x]  Laboratory  []  Microbiology  [x]  Radiology  []  EKG/Telemetry   []  Cardiology/Vascular   []  Pathology  []  Old records  []  Other:  Most notable findings include:       Lab 24  0213 24  0337 24  0217 24  1553   WBC 9.38 8.83 13.67* 14.30*   HEMOGLOBIN 10.1* 10.0* 10.9* 14.2   HEMATOCRIT 33.4* 30.9* 35.1* 45.2   PLATELETS 160 150 161 230   SODIUM 138 138 137 138   POTASSIUM 3.6 3.9 4.7 4.4   CHLORIDE 94* 97* 100 97*   CO2 37.5* 34.4* 28.9 32.1*   BUN 32* 35* 35* 21   CREATININE 1.54* 1.54* 2.02* 1.23   GLUCOSE 132* 101* 115* 122*   CALCIUM 8.7 8.5* 8.1* 9.4   PHOSPHORUS 4.2 2.8 4.8*  --    TOTAL PROTEIN  --   --   --  7.4   ALBUMIN 3.2* 3.0* 3.0* 4.1   GLOBULIN  --   --   --  3.3     Chest x-ray with bibasilar atelectasis    CT Chest Without  Contrast Diagnostic    Result Date: 3/28/2024  EXAM:CT CHEST WO CONTRAST DIAGNOSTIC-  DATE OF EXAM: 3/28/2024 3:56 PM  INDICATION: Postop hypoxia; S72.002A-Fracture of unspecified part of neck of left femur, initial encounter for closed fracture; W07.XXXA-Fall from chair, initial encounter; F03.90-Unspecified dementia, unspecified severity, without behavioral disturbance, psychotic disturbance, mood disturbance, and anxiety; Z74.09-Other reduced mobility; Z78.9-Other specified health status; R26.2-Difficulty in walking, not .  COMPARISON: Chest radiograph 3/27/2024, chest CT 8/16/2022.  TECHNIQUE: Serial and axial CT images of the chest were obtained. Reconstructions in the coronal and sagittal planes were performed. Automated exposure control and iterative reconstruction methods were used.  FINDINGS: Thyroid unremarkable. No supraclavicular adenopathy. Mildly patulous thoracic esophagus with debris noted up to the level of thoracic inlet. Possible small sliding hiatal hernia. Heart size within normal limits. Aortic valve leaflet calcifications. Moderate to severe three-vessel coronary atherosclerotic disease. No pericardial effusion. Aortic atherosclerotic disease without aneurysm. Normal caliber main pulmonary artery. No suspicious mediastinal or hilar adenopathy.  Debris noted in the lower trachea and mainstem bronchi. Small low-density bilateral pleural effusions. Mild bilateral smooth interlobar septal thickening. Underlying centrilobular emphysema. Dependent subpleural opacities in the bilateral upper and bilateral lower lobes. No pneumothorax. No overtly suspicious nodule.  Sequelae of prior granulomatous disease in the liver and spleen. Grossly unchanged left adrenal nodule since 2022 comparison suggesting benign adenomas. Degenerative related changes throughout the spine with probable superimposed diffuse idiopathic skeletal hyperostosis.      Impression: 1. Findings consistent with volume overload/third  spacing including interstitial pulmonary edema and small bilateral pleural effusions. 2. Dependent subpleural opacities in bilateral upper and lower lobes, presumed atelectasis however correlate for clinical signs of developing infection. 3. Mildly patulous debris-filled esophagus noted to the level of the thoracic inlet. Debris also noted within the lower trachea and mainstem bronchi. Patient is at high risk for aspiration. Recommend aspiration precautions.  Recommend continuing annual low-dose chest CT lung screening on an outpatient basis. Previous screening comparison 8/16/2022.      Electronically Signed By-Denzel Oneil MD On:3/28/2024 4:14 PM         Assessment & Plan   Assessment / Plan     Active Hospital Problems:  Active Hospital Problems    Diagnosis     **Hip fracture     History of dementia     Gout     Essential hypertension     Nicotine dependence, cigarettes, with other nicotine-induced disorders          Impression:  Acute hypoxic and hypercapnic respiratory failure requiring high flow  Concern for pulmonary embolus  Acute cardiogenic pulm edema  Acute decompensated diastolic heart failure  Small bilateral pleural effusions  Bibasilar atelectasis  Hypokalemia  Mild aortic valve stenosis  S/p left hip hemiarthroplasty  Tobacco abuse of cigarettes  Concern for undiagnosed COPD  Alzheimer's dementia  Mechanical fall     Plan:  Wean O2 to keep SPO2 greater than 90%  Hypoxia is due to a combination of atelectasis and volume overload  Encourage activity and incentive spirometer use.  Get him out of bed and into chair  Discontinue antibiotics.  No evidence of bacterial infection  Pleural effusions are too small to consider draining  Continue Lasix 60 mg IV 3 times daily  Trend renal panel and electrolytes.  Replace potassium orally  Continue nebulizers and bronchopulmonary hygiene  Appreciate orthopedic surgery input    DVT prophylaxis:  Medical and mechanical DVT prophylaxis orders are  present.    CODE STATUS:   Medical Intervention Limits: NO intubation (DNI); NO cardioversion  Code Status (Patient has no pulse and is not breathing): No CPR (Do Not Attempt to Resuscitate)  Medical Interventions (Patient has pulse or is breathing): Limited Support      Labs, imaging, microbiology, notes and medications personally reviewed  Discussed with primary    Electronically signed by Dawson Gonzalez MD, 03/30/24, 3:16 PM EDT.

## 2024-03-30 NOTE — THERAPY TREATMENT NOTE
Acute Care - Physical Therapy Treatment Note   [place]     Patient Name: Clark Layton  : 1940  MRN: [id number]  Today's Date: 3/30/2024      Visit Dx:     ICD-10-CM ICD-9-CM   1. Left displaced femoral neck fracture  S72.002A 820.8   2. Fall from chair, initial encounter  W07.XXXA E884.2   3. Dementia without behavioral disturbance, psychotic disturbance, mood disturbance, or anxiety, unspecified dementia severity, unspecified dementia type  F03.90 294.20   4. Impaired mobility and ADLs  Z74.09 V49.89    Z78.9    5. Difficulty in walking  R26.2 719.7   6. Dysphagia, unspecified type  R13.10 787.20     Patient Active Problem List   Diagnosis    Nicotine dependence, cigarettes, with other nicotine-induced disorders    High cholesterol    Gout    Essential hypertension    Medication management    Benign prostatic hyperplasia    Left displaced femoral neck fracture    Hip fracture    History of dementia     Past Medical History:   Diagnosis Date    Essential hypertension     Gout     High cholesterol 2018    Medication management 2018    Nicotine dependence, cigarettes, with other nicotine-induced disorders 2018     Past Surgical History:   Procedure Laterality Date    HIP BIPOLAR REPLACEMENT Left 3/27/2024    Procedure: HIP BIPOLAR ANTERIOR;  Surgeon: Bethany Carlson MD;  Location: [facility] MAIN OR;  Service: Orthopedics;  Laterality: Left;     PT Assessment (Last 12 Hours)       PT Evaluation and Treatment       Row Name 24 1021          Physical Therapy Time and Intention    Subjective Information no complaints  -VK     Document Type therapy note (daily note)  -VK     Mode of Treatment individual therapy;physical therapy  -VK     Patient Effort good  -VK       Row Name 24 1021          General Information    Patient Profile Reviewed yes  -VK     Existing Precautions/Restrictions fall;weight bearing  -VK       Row Name 24 1021          Cognition    Affect/Mental Status  (Cognition) confused  -VK     Orientation Status (Cognition) oriented to;person;other (see comments)  Pt appears to be more alert this morning compared to yesterday evening, follow commands somewhat better and participating in therex more.  -VK     Personal Safety Interventions fall prevention program maintained;gait belt;nonskid shoes/slippers when out of bed  -VK       Row Name 03/30/24 1021          Mobility    Extremity Weight-bearing Status left lower extremity  -VK     Left Lower Extremity (Weight-bearing Status) weight-bearing as tolerated (WBAT)  -VK       Row Name 03/30/24 1021          Bed Mobility    Supine-Sit [place] (Bed Mobility) moderate assist (50% patient effort);verbal cues  -VK     Bed Mobility, Safety Issues cognitive deficits limit understanding;decreased use of arms for pushing/pulling;decreased use of legs for bridging/pushing  -VK     Assistive Device (Bed Mobility) bed rails;draw sheet;head of bed elevated  -VK       Row Name 03/30/24 1021          Bed-Chair Transfer    Bed-Chair [place] (Transfers) moderate assist (50% patient effort);maximum assist (25% patient effort);verbal cues;2 person assist  -     Assistive Device (Bed-Chair Transfers) walker, front-wheeled  -VK       Row Name 03/30/24 1021          Sit-Stand Transfer    Sit-Stand [place] (Transfers) maximum assist (25% patient effort);2 person assist;verbal cues  -     Assistive Device (Sit-Stand Transfers) walker, front-wheeled  -VK       Row Name 03/30/24 1021          Stand-Sit Transfer    Stand-Sit [place] (Transfers) maximum assist (25% patient effort);2 person assist;verbal cues  -VK     Assistive Device (Stand-Sit Transfers) walker, front-wheeled  -       Row Name 03/30/24 1021          Gait/Stairs (Locomotion)    Reason Patient was unable to Ambulate Cognitive Deficit/Severe Dementia;Excessive Weakness  -     Gait Assessment/Intervention Pt continues to not be able to advance lower extremities  forward, displays a posterior lean and does not follow cues to keep his feet under him.  -VK       Row Name 03/30/24 1021          Safety Issues, Functional Mobility    Impairments Affecting Function (Mobility) balance;cognition;endurance/activity tolerance;pain;shortness of breath  -VK       Row Name 03/30/24 1021          Balance    Static Sitting Balance minimal assist;verbal cues  -VK     Position, Sitting Balance sitting edge of bed  -VK     Sit to Stand Dynamic Balance maximum assist;verbal cues;2-person assist  -VK     Dynamic Standing Balance maximum assist;2-person assist;verbal cues  -VK     Balance Interventions sit to stand  -VK       Row Name             Wound 03/27/24 1400 Left lateral thigh Incision    Wound - Properties Group Placement Date: 03/27/24  -AR Placement Time: 1400  -AR Side: Left  -AR Orientation: lateral  -AR Location: thigh  -AR Primary Wound Type: Incision  -AR    Retired Wound - Properties Group Placement Date: 03/27/24  -AR Placement Time: 1400  -AR Side: Left  -AR Orientation: lateral  -AR Location: thigh  -AR Primary Wound Type: Incision  -AR    Retired Wound - Properties Group Date first assessed: 03/27/24  -AR Time first assessed: 1400  -AR Side: Left  -AR Location: thigh  -AR Primary Wound Type: Incision  -AR      Row Name 03/30/24 1021          Positioning and Restraints    Pre-Treatment Position in bed  -VK     Post Treatment Position chair  -VK     In Chair reclined;call light within reach;encouraged to call for assist;exit alarm on;with family/caregiver  -VK       Row Name 03/30/24 1021          Progress Summary (PT)    Progress Toward Functional Goals (PT) progress toward functional goals is fair  -VK     Daily Progress Summary (PT) Pt agreeable to treatment. Pt somewhat more alert today and following commands during therex better. Pt continues to be unable to ambulate due to a fairly significant posterior lean and not keeping his feet underneath him, does not follow verbal  cues to correct. Pt continues to benefit from skilled PT to address stated deficits.  -VK               User Key  (r) = Recorded By, (t) = Taken By, (c) = Cosigned By      Initials Name Provider Type    Uma Whitehead, RN CSA Registered Nurse    Saira Lopez PTA Physical Therapist Assistant                Bilateral Lower Extremity AAROM  Exercise  Reps  Sets    Long  arc quads   10 1   Ankle pumps  10 1   Straight leg raise  10 1           PT Recommendation and Plan     Progress Summary (PT)  Progress Toward Functional Goals (PT): progress toward functional goals is fair  Daily Progress Summary (PT): Pt agreeable to treatment. Pt somewhat more alert today and following commands during therex better. Pt continues to be unable to ambulate due to a fairly significant posterior lean and not keeping his feet underneath him, does not follow verbal cues to correct. Pt continues to benefit from skilled PT to address stated deficits.   Outcome Measures       Row Name 03/30/24 1300 03/29/24 1600 03/28/24 1300       How much help from another person do you currently need...    Turning from your back to your side while in flat bed without using bedrails? 2  -VK 2  -VK 2  -RULA    Moving from lying on back to sitting on the side of a flat bed without bedrails? 2  -VK 1  -VK 2  -RULA    Moving to and from a bed to a chair (including a wheelchair)? 2  -VK 1  -VK 2  -RULA    Standing up from a chair using your arms (e.g., wheelchair, bedside chair)? 2  -VK 2  -VK 2  -RULA    Climbing 3-5 steps with a railing? 1  -VK 1  -VK 1  -RULA    To walk in hospital room? 2  -VK 1  -VK 1  -RULA    AM-PAC 6 Clicks Score (PT) 11  -VK 8  -VK 10  -RULA    Highest Level of Mobility Goal 4 --> Transfer to chair/commode  -VK 3 --> Sit at edge of bed  -VK 4 --> Transfer to chair/commode  -RULA       Functional Assessment    Outcome Measure Options -- -- AM-PAC 6 Clicks Basic Mobility (PT)  -RULA              User Key  (r) = Recorded By, (t) = Taken By, (c) =  Cosigned By      Initials Name Provider Type    Bernabe Harris, PT Physical Therapist    VK Saira Fletcher, CARLYN Physical Therapist Assistant                     Time Calculation:    PT Charges       Row Name 03/30/24 1306             Time Calculation    PT Received On 03/30/24  -VK         Timed Charges    77537 - PT Therapeutic Exercise Minutes 12  -VK      35383 - Gait Training Minutes  5  -VK      11032 - PT Therapeutic Activity Minutes 15  -VK         Total Minutes    Timed Charges Total Minutes 32  -VK       Total Minutes 32  -VK                User Key  (r) = Recorded By, (t) = Taken By, (c) = Cosigned By      Initials Name Provider Type    VK Saira Fletcher, CARLYN Physical Therapist Assistant                  Therapy Charges for Today       Code Description Service Date Service Provider Modifiers Qty    47427652386 HC PT THER PROC EA 15 MIN 3/29/2024 Saira Fletcher, PTA GP 1    23835052079 HC PT THERAPEUTIC ACT EA 15 MIN 3/29/2024 Saira Fletcher, [facility] GP 1    55242954637 HC PT THER PROC EA 15 MIN 3/30/2024 Saira Fletcher, PTA GP 1    97128733983 HC PT THERAPEUTIC ACT EA 15 MIN 3/30/2024 Saira Fletcher, PTA GP 1            PT G-Codes  Outcome Measure Options: AM-PAC 6 Clicks Daily Activity (OT), Optimal Instrument  AM-PAC 6 Clicks Score (PT): 11  AM-PAC 6 Clicks Score (OT): 12    Saira Fletcher PTA  3/30/2024

## 2024-03-30 NOTE — PROGRESS NOTES
Orthopedic hemiarthroplasty for fracture progress Note    Assessment/Plan PT/OT, a bit more lucid today, DVT prophylaxis, weight-bear as tolerated, fall precautions, new dressing today.      Status post-left Turner hip arthroplasty: Doing well postoperatively.  However some mild confusion persists    Pain Relief: some relief    Continues current post-op course    Activity: up with assistance    Weight Bearing: WBAT     LOS: 0 days     Subjective     Post-Operative Day: 3 post-op me hip arthroplasty  Systemic or Specific Complaints: No Complaints    Objective     Vital signs in last 24 hours:  Vitals:    03/30/24 0000 03/30/24 0400 03/30/24 0646 03/30/24 0651   BP: 133/50 138/54     BP Location: Right arm Right arm     Patient Position: Lying Lying     Pulse: 82 80 78 81   Resp: 18 18  18   Temp: 97.5 °F (36.4 °C) 98.4 °F (36.9 °C)     TempSrc: Oral Oral     SpO2: 90% 95% 97% 97%   Weight:       Height:            General: alert, appears stated age, and cooperative, calmer today   Neurovascular: Wiggles toes  Capillary refill: Normal   Wound: Wound is clean and dry no evidence of infection.   Range of Motion: Limited flexsion and Limited extension   DVT Exam: Calf supple      WBC   Date Value Ref Range Status   03/30/2024 9.38 3.40 - 10.80 10*3/mm3 Final     RBC   Date Value Ref Range Status   03/30/2024 3.31 (L) 4.14 - 5.80 10*6/mm3 Final     Hemoglobin   Date Value Ref Range Status   03/30/2024 10.1 (L) 13.0 - 17.7 g/dL Final     Hematocrit   Date Value Ref Range Status   03/30/2024 33.4 (L) 37.5 - 51.0 % Final     MCV   Date Value Ref Range Status   03/30/2024 100.9 (H) 79.0 - 97.0 fL Final     MCH   Date Value Ref Range Status   03/30/2024 30.5 26.6 - 33.0 pg Final     MCHC   Date Value Ref Range Status   03/30/2024 30.2 (L) 31.5 - 35.7 g/dL Final     RDW   Date Value Ref Range Status   03/30/2024 13.9 12.3 - 15.4 % Final     RDW-SD   Date Value Ref Range Status   03/30/2024 51.4 37.0 - 54.0 fl Final     MPV    Date Value Ref Range Status   03/30/2024 11.5 6.0 - 12.0 fL Final     Platelets   Date Value Ref Range Status   03/30/2024 160 140 - 450 10*3/mm3 Final     Neutrophil %   Date Value Ref Range Status   03/30/2024 81.2 (H) 42.7 - 76.0 % Final     Lymphocyte %   Date Value Ref Range Status   03/30/2024 9.2 (L) 19.6 - 45.3 % Final     Monocyte %   Date Value Ref Range Status   03/30/2024 8.1 5.0 - 12.0 % Final     Eosinophil %   Date Value Ref Range Status   03/30/2024 0.3 0.3 - 6.2 % Final     Basophil %   Date Value Ref Range Status   03/30/2024 0.5 0.0 - 1.5 % Final     Immature Grans %   Date Value Ref Range Status   03/30/2024 0.7 (H) 0.0 - 0.5 % Final     Neutrophils, Absolute   Date Value Ref Range Status   03/30/2024 7.61 (H) 1.70 - 7.00 10*3/mm3 Final     Lymphocytes, Absolute   Date Value Ref Range Status   03/30/2024 0.86 0.70 - 3.10 10*3/mm3 Final     Monocytes, Absolute   Date Value Ref Range Status   03/30/2024 0.76 0.10 - 0.90 10*3/mm3 Final     Eosinophils, Absolute   Date Value Ref Range Status   03/30/2024 0.03 0.00 - 0.40 10*3/mm3 Final     Basophils, Absolute   Date Value Ref Range Status   03/30/2024 0.05 0.00 - 0.20 10*3/mm3 Final     Immature Grans, Absolute   Date Value Ref Range Status   03/30/2024 0.07 (H) 0.00 - 0.05 10*3/mm3 Final     nRBC   Date Value Ref Range Status   03/30/2024 0.0 0.0 - 0.2 /100 WBC Final        Basic Metabolic Panel    Sodium Sodium   Date Value Ref Range Status   03/30/2024 138 136 - 145 mmol/L Final   03/29/2024 138 136 - 145 mmol/L Final   03/28/2024 137 136 - 145 mmol/L Final      Potassium Potassium   Date Value Ref Range Status   03/30/2024 3.6 3.5 - 5.2 mmol/L Final   03/29/2024 3.9 3.5 - 5.2 mmol/L Final   03/28/2024 4.7 3.5 - 5.2 mmol/L Final      Chloride Chloride   Date Value Ref Range Status   03/30/2024 94 (L) 98 - 107 mmol/L Final   03/29/2024 97 (L) 98 - 107 mmol/L Final   03/28/2024 100 98 - 107 mmol/L Final      Bicarbonate No results found for:  "\"PLASMABICARB\"   BUN BUN   Date Value Ref Range Status   03/30/2024 32 (H) 8 - 23 mg/dL Final   03/29/2024 35 (H) 8 - 23 mg/dL Final   03/28/2024 35 (H) 8 - 23 mg/dL Final      Creatinine Creatinine   Date Value Ref Range Status   03/30/2024 1.54 (H) 0.76 - 1.27 mg/dL Final   03/29/2024 1.54 (H) 0.76 - 1.27 mg/dL Final   03/28/2024 2.02 (H) 0.76 - 1.27 mg/dL Final      Calcium Calcium   Date Value Ref Range Status   03/30/2024 8.7 8.6 - 10.5 mg/dL Final   03/29/2024 8.5 (L) 8.6 - 10.5 mg/dL Final   03/28/2024 8.1 (L) 8.6 - 10.5 mg/dL Final      Glucose      No components found for: \"GLUCOSE.*\"      CT Chest Without Contrast Diagnostic   Final Result   1. Findings consistent with volume overload/third spacing including   interstitial pulmonary edema and small bilateral pleural effusions.   2. Dependent subpleural opacities in bilateral upper and lower lobes,   presumed atelectasis however correlate for clinical signs of developing   infection.   3. Mildly patulous debris-filled esophagus noted to the level of the   thoracic inlet. Debris also noted within the lower trachea and mainstem   bronchi. Patient is at high risk for aspiration. Recommend aspiration   precautions.       Recommend continuing annual low-dose chest CT lung screening on an   outpatient basis. Previous screening comparison 8/16/2022.                       Electronically Signed ByUna Oneil MD On:3/28/2024 4:14 PM          US Renal Bilateral   Final Result   1. Normal size kidneys without hydronephrosis.   2. Small subcentimeter cyst along the cortex of the left kidney, similar   to 2020.       Electronically Signed ByAnny Girard MD On:3/28/2024 1:50 PM          NM Lung Scan Perfusion Particulate   Final Result   1. No peripheral wedge-shaped defects to suggest presence of underlying   pulmonary embolism. Very low probability study per perfusion only PIOPED   2 criteria.           Electronically Signed ByAnny Girard MD " On:3/28/2024 1:59 PM          XR Chest 1 View   Final Result   Impression:   Increased diffuse interstitial opacities which may represent a component   of interstitial edema. No focal consolidation or pleural effusion.           Electronically Signed By-Kvng Beth MD On:3/27/2024 6:00 PM          XR Hip With or Without Pelvis 2 - 3 View Left   Final Result   Impression:   1.  Status post left hip arthroplasty.   2.  No immediate postoperative complication is identified.           Electronically Signed By-Rafael Pimentel MD On:3/27/2024 3:39 PM          FL Surgery Fluoro   Final Result      CT Pelvis Without Contrast   Final Result   Impression:   1.  Nondisplaced left femoral neck fracture   2.  Degenerative changes are noted involving the sacroiliac joints and   lower lumbar spine.   3.  Colonic diverticulosis.   4.  Enlarged prostate   5.  Left inguinal hernia containing fat.               Electronically Signed By-Aldo العراقي MD On:3/26/2024 6:15 PM          XR Chest 1 View   Final Result   No acute cardiopulmonary process identified.           Electronically Signed By-Osito Dutton MD On:3/26/2024 4:32 PM          XR Hip With or Without Pelvis 2 - 3 View Left   Final Result   1.  There is appearance to the left femur that is concerning for femoral   neck fracture with suggested foreshortening of the femoral neck.   Additional imaging may be helpful to better assess this.           Electronically Signed By-Aldo العراقي MD On:3/26/2024 4:34 PM

## 2024-03-30 NOTE — PLAN OF CARE
Goal Outcome Evaluation: Patient a/ox1 self with confusion, VSS, family at bedside, francois catheter in place and draining to bedside drainage. Transferred with max of 2 with gait belt and walker. Dressing to left hip clean dry and intact. Currently up in recliner chair and call light in reach.

## 2024-03-30 NOTE — PROGRESS NOTES
[facility]     Progress Note    Patient Name: Clark Layton  : 1940  MRN: [id number]  Primary Care Physician:  Honorio Field MD  Date of admission: 3/26/2024    Subjective   No major acute events overnight  Oxygen requirement has gone down  Patient responding well to diuresis      Scheduled Meds:allopurinol, 300 mg, Oral, Daily  arformoterol, 15 mcg, Nebulization, BID - RT  budesonide, 0.5 mg, Nebulization, BID - RT  enoxaparin, 30 mg, Subcutaneous, Daily  famotidine, 20 mg, Oral, Daily  ferrous sulfate, 325 mg, Oral, Daily With Breakfast  furosemide, 60 mg, Intravenous, TID  nicotine, 1 patch, Transdermal, Q24H  potassium chloride, 40 mEq, Oral, Once  senna-docusate sodium, 2 tablet, Oral, BID  sodium chloride, 10 mL, Intravenous, Q12H  sodium chloride, 10 mL, Intravenous, Q12H  tamsulosin, 0.4 mg, Oral, Daily      Continuous Infusions:   PRN Meds:.  acetaminophen **OR** acetaminophen **OR** acetaminophen    acetaminophen    aluminum-magnesium hydroxide-simethicone    senna-docusate sodium **AND** polyethylene glycol **AND** bisacodyl **AND** bisacodyl    haloperidol lactate    HYDROcodone-acetaminophen    HYDROcodone-acetaminophen    ipratropium-albuterol    magnesium hydroxide    melatonin    Morphine **AND** [DISCONTINUED] naloxone    Morphine **AND** naloxone    ondansetron ODT **OR** ondansetron    promethazine **OR** promethazine    [COMPLETED] Insert Peripheral IV **AND** sodium chloride    sodium chloride    sodium chloride    sodium chloride    sodium chloride       Review of Systems  Constitutional:        Weakness tiredness fatigue  Eyes:                       No blurry vision, eye discharge, eye irritation, eye pain  HEENT:                   No acute hair loss, earache and discharge, nasal congestion or discharge, sore throat, postnasal drip  Respiratory:           No shortness of breath coughing sputum production wheezing hemoptysis pleuritic chest pain  Cardiovascular:     No  chest pain, orthopnea, PND, dizziness, palpitation, lower extremity edema  Gastrointestinal:   No nausea vomiting diarrhea abdominal pain constipation  Genitourinary:       No urinary incontinence, hesitancy, frequency, urgency, dysuria  Hematologic:         No bruising, bleeding, pallor, lymphadenopathy  Endocrine:            No coldness, hot flashes, polyuria, abnormal hair growth  Musculoskeletal:    No body pains, aches, arthritic pains, muscle pain ,muscle wasting  Psychiatric:          No low or high mood, anxiety, hallucinations, delusions  Skin.                      No rash, ulcers, bruising, itching  Neurological:        No confusion, headache, focal weakness, numbness, dysphasia    Objective   Objective     Vitals:   Temp:  [97.5 °F (36.4 °C)-99.1 °F (37.3 °C)] 98.7 °F (37.1 °C)  Heart Rate:  [77-93] 93  Resp:  [18] 18  BP: (133-152)/(50-61) 152/61  Flow (L/min):  [2-6] 2  Physical Exam    Constitutional: Awake, alert responsive, conversant, no obvious distress              Psychiatric:  Appropriate affect, cooperative   Neurologic:  Awake alert ,oriented x 3, strength symmetric in all extremities, Cranial Nerves grossly intact to confrontation, speech clear   Eyes:   PERRLA, sclerae anicteric, no conjunctival injection   HEENT:  Moist mucous membranes, no nasal or eye discharge, no throat congestion   Neck:   Supple, no thyromegaly, no lymphadenopathy, trachea midline, no elevated JVD   Respiratory:  Clear to auscultation bilaterally, nonlabored respirations    Cardiovascular: RRR, no murmurs, rubs, or gallops, palpable pedal pulses bilaterally, No bilateral ankle edema   Gastrointestinal: Positive bowel sounds, soft, nontender, nondistended, no organomegaly   Musculoskeletal:  No clubbing or cyanosis to extremities,muscle wasting, joint swelling, muscle weakness             Skin:                      No rashes, bruising, skin ulcers, petechiae or ecchymosis    Result Review    Result Review:  I have  personally reviewed the results from the time of this admission to 3/30/2024 08:43 EDT and agree with these findings:  []  Laboratory  []  Microbiology  []  Radiology  []  EKG/Telemetry   []  Cardiology/Vascular   []  Pathology  []  Old records  []  Other:    Assessment & Plan   Assessment / Plan       Active Hospital Problems:  Active Hospital Problems    Diagnosis     **Hip fracture     History of dementia     Gout     Essential hypertension     Nicotine dependence, cigarettes, with other nicotine-induced disorders      83-year-old male with past medical history of hypertension, hyperuricemia, BPH, CKD stage IIIa 1.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  Noted to have an BÁRBARA with creatinine rise to 2 with chest x-ray showing some diffuse infiltrates with elevated D-dimer.  BÁRBARA most likely due to relative hypotension and hemodynamic changes.  Patient's proBNP normal at 600 urine analysis with many hyaline cast likely in the setting of diuresis.  CT scan concerning for possible volume overload.  Nuclear scan was negative for PE.  Echo with EF of 60%.  Renal ultrasound was negative     Plan:   Continue Lasix 60 mg IV 3 times daily

## 2024-03-31 LAB
ALBUMIN SERPL-MCNC: 3.3 G/DL (ref 3.5–5.2)
ANION GAP SERPL CALCULATED.3IONS-SCNC: 12 MMOL/L (ref 5–15)
BUN SERPL-MCNC: 36 MG/DL (ref 8–23)
BUN/CREAT SERPL: 27.1 (ref 7–25)
CALCIUM SPEC-SCNC: 9.2 MG/DL (ref 8.6–10.5)
CHLORIDE SERPL-SCNC: 92 MMOL/L (ref 98–107)
CO2 SERPL-SCNC: 36 MMOL/L (ref 22–29)
CREAT SERPL-MCNC: 1.33 MG/DL (ref 0.76–1.27)
EGFRCR SERPLBLD CKD-EPI 2021: 53.7 ML/MIN/1.73
FOLATE BLD-MCNC: 297 NG/ML
FOLATE RBC-MCNC: 952 NG/ML
GLUCOSE SERPL-MCNC: 158 MG/DL (ref 65–99)
HCT VFR BLD AUTO: 31.2 % (ref 37.5–51)
MAGNESIUM SERPL-MCNC: 1.9 MG/DL (ref 1.6–2.4)
PHOSPHATE SERPL-MCNC: 3.3 MG/DL (ref 2.5–4.5)
PHOSPHATE SERPL-MCNC: 3.3 MG/DL (ref 2.5–4.5)
POTASSIUM SERPL-SCNC: 3.5 MMOL/L (ref 3.5–5.2)
SODIUM SERPL-SCNC: 140 MMOL/L (ref 136–145)

## 2024-03-31 PROCEDURE — 80069 RENAL FUNCTION PANEL: CPT | Performed by: INTERNAL MEDICINE

## 2024-03-31 PROCEDURE — 99233 SBSQ HOSP IP/OBS HIGH 50: CPT | Performed by: INTERNAL MEDICINE

## 2024-03-31 PROCEDURE — 97530 THERAPEUTIC ACTIVITIES: CPT

## 2024-03-31 PROCEDURE — 25010000002 ENOXAPARIN PER 10 MG: Performed by: INTERNAL MEDICINE

## 2024-03-31 PROCEDURE — 94664 DEMO&/EVAL PT USE INHALER: CPT

## 2024-03-31 PROCEDURE — 97116 GAIT TRAINING THERAPY: CPT

## 2024-03-31 PROCEDURE — 25010000002 MAGNESIUM SULFATE IN D5W 1G/100ML (PREMIX) 1-5 GM/100ML-% SOLUTION

## 2024-03-31 PROCEDURE — 94761 N-INVAS EAR/PLS OXIMETRY MLT: CPT

## 2024-03-31 PROCEDURE — 97110 THERAPEUTIC EXERCISES: CPT

## 2024-03-31 PROCEDURE — 94799 UNLISTED PULMONARY SVC/PX: CPT

## 2024-03-31 PROCEDURE — 84100 ASSAY OF PHOSPHORUS: CPT | Performed by: INTERNAL MEDICINE

## 2024-03-31 PROCEDURE — 83735 ASSAY OF MAGNESIUM: CPT | Performed by: INTERNAL MEDICINE

## 2024-03-31 RX ORDER — POTASSIUM CHLORIDE 750 MG/1
40 CAPSULE, EXTENDED RELEASE ORAL ONCE
Status: COMPLETED | OUTPATIENT
Start: 2024-03-31 | End: 2024-03-31

## 2024-03-31 RX ORDER — FUROSEMIDE 40 MG/1
80 TABLET ORAL
Status: DISCONTINUED | OUTPATIENT
Start: 2024-03-31 | End: 2024-04-01

## 2024-03-31 RX ORDER — MAGNESIUM SULFATE 1 G/100ML
1 INJECTION INTRAVENOUS ONCE
Status: COMPLETED | OUTPATIENT
Start: 2024-03-31 | End: 2024-03-31

## 2024-03-31 RX ORDER — POLYETHYLENE GLYCOL 3350 17 G/17G
17 POWDER, FOR SOLUTION ORAL DAILY
Status: DISCONTINUED | OUTPATIENT
Start: 2024-03-31 | End: 2024-04-04 | Stop reason: HOSPADM

## 2024-03-31 RX ORDER — MORPHINE SULFATE 2 MG/ML
2 INJECTION, SOLUTION INTRAMUSCULAR; INTRAVENOUS EVERY 4 HOURS PRN
Status: DISCONTINUED | OUTPATIENT
Start: 2024-03-31 | End: 2024-04-04 | Stop reason: HOSPADM

## 2024-03-31 RX ADMIN — NICOTINE 1 PATCH: 21 PATCH, EXTENDED RELEASE TRANSDERMAL at 09:35

## 2024-03-31 RX ADMIN — FUROSEMIDE 80 MG: 40 TABLET ORAL at 09:34

## 2024-03-31 RX ADMIN — BUDESONIDE 0.5 MG: 0.5 INHALANT ORAL at 19:40

## 2024-03-31 RX ADMIN — TAMSULOSIN HYDROCHLORIDE 0.4 MG: 0.4 CAPSULE ORAL at 09:35

## 2024-03-31 RX ADMIN — FERROUS SULFATE TAB 325 MG (65 MG ELEMENTAL FE) 325 MG: 325 (65 FE) TAB at 09:34

## 2024-03-31 RX ADMIN — ARFORMOTEROL TARTRATE 15 MCG: 15 SOLUTION RESPIRATORY (INHALATION) at 19:40

## 2024-03-31 RX ADMIN — ENOXAPARIN SODIUM 30 MG: 100 INJECTION SUBCUTANEOUS at 09:35

## 2024-03-31 RX ADMIN — Medication 10 ML: at 21:50

## 2024-03-31 RX ADMIN — Medication 10 ML: at 09:36

## 2024-03-31 RX ADMIN — DOCUSATE SODIUM 50MG AND SENNOSIDES 8.6MG 2 TABLET: 8.6; 5 TABLET, FILM COATED ORAL at 09:35

## 2024-03-31 RX ADMIN — MAGNESIUM SULFATE 1 G: 1 INJECTION INTRAVENOUS at 09:36

## 2024-03-31 RX ADMIN — POTASSIUM CHLORIDE 40 MEQ: 10 CAPSULE, COATED, EXTENDED RELEASE ORAL at 09:35

## 2024-03-31 RX ADMIN — Medication 10 ML: at 09:37

## 2024-03-31 RX ADMIN — POLYETHYLENE GLYCOL 3350 17 G: 17 POWDER, FOR SOLUTION ORAL at 11:07

## 2024-03-31 RX ADMIN — ALLOPURINOL 300 MG: 300 TABLET ORAL at 09:35

## 2024-03-31 RX ADMIN — ARFORMOTEROL TARTRATE 15 MCG: 15 SOLUTION RESPIRATORY (INHALATION) at 06:32

## 2024-03-31 RX ADMIN — FAMOTIDINE 20 MG: 20 TABLET ORAL at 09:35

## 2024-03-31 RX ADMIN — FUROSEMIDE 80 MG: 40 TABLET ORAL at 17:50

## 2024-03-31 RX ADMIN — BUDESONIDE 0.5 MG: 0.5 INHALANT ORAL at 06:32

## 2024-03-31 NOTE — PLAN OF CARE
Goal Outcome Evaluation:   Patient a/ox1 to self otherwise confused, VSS, Max assist x2 for transfers. Crowder catheter removed today and tele monitor discontinued per physician order. No c/o pain. Decreased appetite physician aware and has spoken to family. Slept majority of shift but was arousable. Wound care consult and palliative care consults were ordered. Turn q2hr and positioned with pillows. New pressure sore found to left buttock physician and family aware.Currently resting quietly in bed with call light in reach and family at bedside.

## 2024-03-31 NOTE — PROGRESS NOTES
Pulmonary / Critical Care Progress Note      Patient Name: Clark Layton  : 1940  MRN: [id number]  Attending:  Aleks Carcamo MD  Date of admission: 3/26/2024    Subjective   Subjective   Follow-up for acute hypoxic respiratory failure    No acute events overnight    This morning,  On 2 L nasal cannula  Lying in bed resting  Reports feeling better today  Denies any chest pain or chest tightness  Dyspnea improved  Cough improved  No fever or chills  Diuresing well  -1.8 L urine output      Objective   Objective     Vitals:   Temp:  [97.9 °F (36.6 °C)-98.7 °F (37.1 °C)] 98.2 °F (36.8 °C)  Heart Rate:  [78-95] 79  Resp:  [18] 18  BP: (128-154)/(53-67) 136/56  Flow (L/min):  [2-4] 2    Physical Exam   Vital Signs Reviewed   General:  WDWN, Alert, elderly male, lying in bed  HEENT:  PERRL, EOMI.  OP, nares clear  Chest:  good aeration, clear to auscultation bilaterally, no work of breathing noted on 2 L  CV: RRR, no MGR, pulses 2+, equal.  Abd:  Soft, NT, ND, + BS  EXT:  no clubbing, no cyanosis, no edema  Neuro:  A&Ox3, CN grossly intact, no focal deficits.  Skin: No rashes or lesions noted      Result Review    Result Review:  I have personally reviewed the results from the time of this admission to 3/31/2024 06:09 EDT and agree with these findings:  [x]  Laboratory  []  Microbiology  [x]  Radiology  []  EKG/Telemetry   []  Cardiology/Vascular   []  Pathology  []  Old records  []  Other:  Most notable findings include:       Lab 24  0230 24  0213 24  0337 24  0217 24  1553   WBC  --  9.38 8.83 13.67* 14.30*   HEMOGLOBIN  --  10.1* 10.0* 10.9* 14.2   HEMATOCRIT  --  33.4* 30.9* 35.1* 45.2   PLATELETS  --  160 150 161 230   SODIUM 140 138 138 137 138   POTASSIUM 3.5 3.6 3.9 4.7 4.4   CHLORIDE 92* 94* 97* 100 97*   CO2 36.0* 37.5* 34.4* 28.9 32.1*   BUN 36* 32* 35* 35* 21   CREATININE 1.33* 1.54* 1.54* 2.02* 1.23   GLUCOSE 158* 132* 101* 115* 122*   CALCIUM 9.2 8.7 8.5* 8.1* 9.4    PHOSPHORUS 3.3 4.2 2.8 4.8*  --    TOTAL PROTEIN  --   --   --   --  7.4   ALBUMIN 3.3* 3.2* 3.0* 3.0* 4.1   GLOBULIN  --   --   --   --  3.3     Chest x-ray with bibasilar atelectasis    CT Chest Without Contrast Diagnostic    Result Date: 3/28/2024  EXAM:CT CHEST WO CONTRAST DIAGNOSTIC-  DATE OF EXAM: 3/28/2024 3:56 PM  INDICATION: Postop hypoxia; S72.002A-Fracture of unspecified part of neck of left femur, initial encounter for closed fracture; W07.XXXA-Fall from chair, initial encounter; F03.90-Unspecified dementia, unspecified severity, without behavioral disturbance, psychotic disturbance, mood disturbance, and anxiety; Z74.09-Other reduced mobility; Z78.9-Other specified health status; R26.2-Difficulty in walking, not .  COMPARISON: Chest radiograph 3/27/2024, chest CT 8/16/2022.  TECHNIQUE: Serial and axial CT images of the chest were obtained. Reconstructions in the coronal and sagittal planes were performed. Automated exposure control and iterative reconstruction methods were used.  FINDINGS: Thyroid unremarkable. No supraclavicular adenopathy. Mildly patulous thoracic esophagus with debris noted up to the level of thoracic inlet. Possible small sliding hiatal hernia. Heart size within normal limits. Aortic valve leaflet calcifications. Moderate to severe three-vessel coronary atherosclerotic disease. No pericardial effusion. Aortic atherosclerotic disease without aneurysm. Normal caliber main pulmonary artery. No suspicious mediastinal or hilar adenopathy.  Debris noted in the lower trachea and mainstem bronchi. Small low-density bilateral pleural effusions. Mild bilateral smooth interlobar septal thickening. Underlying centrilobular emphysema. Dependent subpleural opacities in the bilateral upper and bilateral lower lobes. No pneumothorax. No overtly suspicious nodule.  Sequelae of prior granulomatous disease in the liver and spleen. Grossly unchanged left adrenal nodule since 2022 comparison  suggesting benign adenomas. Degenerative related changes throughout the spine with probable superimposed diffuse idiopathic skeletal hyperostosis.      Impression: 1. Findings consistent with volume overload/third spacing including interstitial pulmonary edema and small bilateral pleural effusions. 2. Dependent subpleural opacities in bilateral upper and lower lobes, presumed atelectasis however correlate for clinical signs of developing infection. 3. Mildly patulous debris-filled esophagus noted to the level of the thoracic inlet. Debris also noted within the lower trachea and mainstem bronchi. Patient is at high risk for aspiration. Recommend aspiration precautions.  Recommend continuing annual low-dose chest CT lung screening on an outpatient basis. Previous screening comparison 8/16/2022.      Electronically Signed By-Denzel Oneil MD On:3/28/2024 4:14 PM         Assessment & Plan   Assessment / Plan     Active Hospital Problems:  Active Hospital Problems    Diagnosis     **Hip fracture     History of dementia     Gout     Essential hypertension     Nicotine dependence, cigarettes, with other nicotine-induced disorders        Impression:  Acute hypoxic and hypercapnic respiratory failure requiring high flow  Concern for pulmonary embolus  Acute cardiogenic pulm edema  Acute decompensated diastolic heart failure  Small bilateral pleural effusions  Bibasilar atelectasis  Hypokalemia  Mild aortic valve stenosis  S/p left hip hemiarthroplasty  Tobacco abuse of cigarettes  Concern for undiagnosed COPD  Alzheimer's dementia  Mechanical fall     Plan:  -On 2 L nasal cannula.  Continue Xopenex to make SpO2 greater than 90%.  -Continue Brovana, Pulmicort, and DuoNebs  -Continue Lasix 80 mg oral twice daily  -Continue to monitor renal panel and electrolytes.  Replaced potassium orally and magnesium intravenously  -Continue bronchopulmonary bronchodilator protocols.  Encourage I-S  -Encourage mobilization.  Out of bed  to chair  -PT/OT/SLP on board.  Appreciate assistance  -Encouraged tobacco cessation  -Nephrology following, appreciate input  -Orthopedics following, appreciate input    DVT prophylaxis:  Medical and mechanical DVT prophylaxis orders are present.    CODE STATUS:   Medical Intervention Limits: NO intubation (DNI); NO cardioversion  Code Status (Patient has no pulse and is not breathing): No CPR (Do Not Attempt to Resuscitate)  Medical Interventions (Patient has pulse or is breathing): Limited Support      Labs, imaging, microbiology, notes and medications personally reviewed  Discussed with primary    I, Dr. Dawson Gonzalez, have spent more than 50% of the total time managing the patient in this encounter today.  This included personally reviewing all pertinent labs, imaging, microbiology and documentation. Also discussing the case with the patient and any available family, the admitting physician and any available ancillary staff.    Electronically signed by KAMERON Hathaway, 03/31/24, 10:50 AM EDT.  Electronically signed by Daswon Gonzalez MD, 03/31/24, 3:13 PM EDT.

## 2024-03-31 NOTE — PROGRESS NOTES
[facility]     Progress Note    Patient Name: Clark Layton  : 1940  MRN: [id number]  Primary Care Physician:  Honorio Field MD  Date of admission: 3/26/2024    Subjective   Patient continues to respond well to diuretics  Renal function is at baseline  No medical events overnight    Scheduled Meds:allopurinol, 300 mg, Oral, Daily  arformoterol, 15 mcg, Nebulization, BID - RT  budesonide, 0.5 mg, Nebulization, BID - RT  enoxaparin, 30 mg, Subcutaneous, Daily  famotidine, 20 mg, Oral, Daily  ferrous sulfate, 325 mg, Oral, Daily With Breakfast  furosemide, 80 mg, Oral, BID  magnesium sulfate, 1 g, Intravenous, Once  nicotine, 1 patch, Transdermal, Q24H  potassium chloride, 40 mEq, Oral, Once  senna-docusate sodium, 2 tablet, Oral, BID  sodium chloride, 10 mL, Intravenous, Q12H  sodium chloride, 10 mL, Intravenous, Q12H  tamsulosin, 0.4 mg, Oral, Daily      Continuous Infusions:   PRN Meds:.  acetaminophen **OR** acetaminophen **OR** acetaminophen    acetaminophen    aluminum-magnesium hydroxide-simethicone    senna-docusate sodium **AND** polyethylene glycol **AND** bisacodyl **AND** bisacodyl    haloperidol lactate    HYDROcodone-acetaminophen    HYDROcodone-acetaminophen    ipratropium-albuterol    magnesium hydroxide    melatonin    Morphine **AND** [DISCONTINUED] naloxone    Morphine **AND** naloxone    ondansetron ODT **OR** ondansetron    promethazine **OR** promethazine    [COMPLETED] Insert Peripheral IV **AND** sodium chloride    sodium chloride    sodium chloride    sodium chloride    sodium chloride       Review of Systems  Constitutional:        Weakness tiredness fatigue  Eyes:                       No blurry vision, eye discharge, eye irritation, eye pain  HEENT:                   No acute hair loss, earache and discharge, nasal congestion or discharge, sore throat, postnasal drip  Respiratory:           No shortness of breath coughing sputum production wheezing hemoptysis pleuritic  chest pain  Cardiovascular:     No chest pain, orthopnea, PND, dizziness, palpitation, lower extremity edema  Gastrointestinal:   No nausea vomiting diarrhea abdominal pain constipation  Genitourinary:       No urinary incontinence, hesitancy, frequency, urgency, dysuria  Hematologic:         No bruising, bleeding, pallor, lymphadenopathy  Endocrine:            No coldness, hot flashes, polyuria, abnormal hair growth  Musculoskeletal:    No body pains, aches, arthritic pains, muscle pain ,muscle wasting  Psychiatric:          No low or high mood, anxiety, hallucinations, delusions  Skin.                      No rash, ulcers, bruising, itching  Neurological:        No confusion, headache, focal weakness, numbness, dysphasia    Objective   Objective     Vitals:   Temp:  [97.9 °F (36.6 °C)-98.7 °F (37.1 °C)] 98.4 °F (36.9 °C)  Heart Rate:  [79-95] 87  Resp:  [18] 18  BP: (128-154)/(53-67) 140/58  Flow (L/min):  [2] 2  Physical Exam    Constitutional: Awake, alert responsive, conversant, no obvious distress              Psychiatric:  Appropriate affect, cooperative   Neurologic:  Awake alert ,oriented x 3, strength symmetric in all extremities, Cranial Nerves grossly intact to confrontation, speech clear   Eyes:   PERRLA, sclerae anicteric, no conjunctival injection   HEENT:  Moist mucous membranes, no nasal or eye discharge, no throat congestion   Neck:   Supple, no thyromegaly, no lymphadenopathy, trachea midline, no elevated JVD   Respiratory:  Clear to auscultation bilaterally, nonlabored respirations    Cardiovascular: RRR, no murmurs, rubs, or gallops, palpable pedal pulses bilaterally, No bilateral ankle edema   Gastrointestinal: Positive bowel sounds, soft, nontender, nondistended, no organomegaly   Musculoskeletal:  No clubbing or cyanosis to extremities,muscle wasting, joint swelling, muscle weakness             Skin:                      No rashes, bruising, skin ulcers, petechiae or ecchymosis    Result  Review    Result Review:  I have personally reviewed the results from the time of this admission to 3/31/2024 08:11 EDT and agree with these findings:  []  Laboratory  []  Microbiology  []  Radiology  []  EKG/Telemetry   []  Cardiology/Vascular   []  Pathology  []  Old records  []  Other:    Assessment & Plan   Assessment / Plan       Active Hospital Problems:  Active Hospital Problems    Diagnosis     **Hip fracture     History of dementia     Gout     Essential hypertension     Nicotine dependence, cigarettes, with other nicotine-induced disorders      83-year-old male with past medical history of hypertension, hyperuricemia, BPH, CKD stage IIIa 1.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  Noted to have an BÁRBARA with creatinine rise to 2 with chest x-ray showing some diffuse infiltrates with elevated D-dimer.  BÁRBARA most likely due to relative hypotension and hemodynamic changes.  Patient's proBNP normal at 600 urine analysis with many hyaline cast likely in the setting of diuresis.  CT scan concerning for possible volume overload.  Nuclear scan was negative for PE.  Echo with EF of 60%.  Renal ultrasound was negative.  With diuresis improvement noted     Plan:   Will switch Lasix over to p.o. 80 mg twice daily  Discontinue Crowder

## 2024-03-31 NOTE — THERAPY TREATMENT NOTE
Acute Care - Physical Therapy Progress Note   [facility]     Patient Name: Clark Layton  : 1940  MRN: [id number]  Today's Date: 3/31/2024      Visit Dx:     ICD-10-CM ICD-9-CM   1. Left displaced femoral neck fracture  S72.002A 820.8   2. Fall from chair, initial encounter  W07.XXXA E884.2   3. Dementia without behavioral disturbance, psychotic disturbance, mood disturbance, or anxiety, unspecified dementia severity, unspecified dementia type  F03.90 294.20   4. Impaired mobility and ADLs  Z74.09 V49.89    Z78.9    5. Difficulty in walking  R26.2 719.7   6. Dysphagia, unspecified type  R13.10 787.20     Patient Active Problem List   Diagnosis    Nicotine dependence, cigarettes, with other nicotine-induced disorders    High cholesterol    Gout    Essential hypertension    Medication management    Benign prostatic hyperplasia    Left displaced femoral neck fracture    Hip fracture    History of dementia     Past Medical History:   Diagnosis Date    Essential hypertension     Gout     High cholesterol 2018    Medication management 2018    Nicotine dependence, cigarettes, with other nicotine-induced disorders 2018     Past Surgical History:   Procedure Laterality Date    HIP BIPOLAR REPLACEMENT Left 3/27/2024    Procedure: HIP BIPOLAR ANTERIOR;  Surgeon: Bethany Carlson MD;  Location: [facility] MAIN OR;  Service: Orthopedics;  Laterality: Left;     PT Assessment (Last 12 Hours)       PT Evaluation and Treatment       Row Name 24 1545 24 1500       Physical Therapy Time and Intention    Subjective Information no complaints  -CS no complaints  -CS    Document Type therapy note (daily note)  -CS therapy note (daily note)  -CS    Mode of Treatment individual therapy;physical therapy  -CS individual therapy;physical therapy  -CS    Patient Effort fair  -CS fair  -CS    Symptoms Noted During/After Treatment fatigue  -CS fatigue  -CS      Row Name 24 1545 24 1500       Bed  Mobility    Supine-Sit [place] (Bed Mobility) -- verbal cues;maximum assist (25% patient effort);1 person assist  -CS    Sit-Supine [place] (Bed Mobility) maximum assist (25% patient effort);2 person assist  -CS --    Bed Mobility, Safety Issues cognitive deficits limit understanding;decreased use of arms for pushing/pulling;decreased use of legs for bridging/pushing  -CS cognitive deficits limit understanding;decreased use of arms for pushing/pulling;decreased use of legs for bridging/pushing  -CS    Assistive Device (Bed Mobility) bed rails;draw sheet;head of bed elevated  -CS bed rails;draw sheet;head of bed elevated  -CS      Row Name 03/31/24 1548          Transfers    Transfers toilet transfer  -CS       Row Name 03/31/24 1545 03/31/24 1500       Bed-Chair Transfer    Bed-Chair [place] (Transfers) verbal cues;maximum assist (25% patient effort);1 person assist  -CS verbal cues;maximum assist (25% patient effort);1 person assist  -CS    Assistive Device (Bed-Chair Transfers) walker, front-wheeled  -CS walker, front-wheeled  -CS      Row Name 03/31/24 1545 03/31/24 1500       Sit-Stand Transfer    Sit-Stand [place] (Transfers) verbal cues;nonverbal cues (demo/gesture);maximum assist (25% patient effort);1 person assist  -CS verbal cues;nonverbal cues (demo/gesture);maximum assist (25% patient effort);1 person assist  -CS    Assistive Device (Sit-Stand Transfers) walker, front-wheeled  -CS walker, front-wheeled  -CS    Comment, (Sit-Stand Transfer) -- x 4 reps  -CS      Row Name 03/31/24 1545 03/31/24 1500       Stand-Sit Transfer    Stand-Sit [place] (Transfers) verbal cues;moderate assist (50% patient effort);1 person assist  -CS verbal cues;moderate assist (50% patient effort);1 person assist  -CS    Assistive Device (Stand-Sit Transfers) walker, front-wheeled  -CS walker, front-wheeled  -CS      Row Name 03/31/24 1548          Toilet Transfer    Type (Toilet Transfer) stand  pivot/stand step  -     [place] Level (Toilet Transfer) verbal cues;maximum assist (25% patient effort);1 person assist  -       Row Name 03/31/24 1545          Motor Skills    Therapeutic Exercise hip;ankle;knee  -       Row Name 03/31/24 1545          Hip (Therapeutic Exercise)    Hip (Therapeutic Exercise) AAROM (active assistive range of motion)  -     Hip AAROM (Therapeutic Exercise) bilateral;aBduction;aDduction;sitting;10 repetitions;2 sets  marches  -       Row Name 03/31/24 1545          Knee (Therapeutic Exercise)    Knee (Therapeutic Exercise) AAROM (active assistive range of motion)  -     Knee AAROM (Therapeutic Exercise) bilateral;sitting;10 repetitions;2 sets  LAQ's  -       Row Name 03/31/24 1545          Ankle (Therapeutic Exercise)    Ankle (Therapeutic Exercise) AAROM (active assistive range of motion)  -     Ankle AAROM (Therapeutic Exercise) bilateral;dorsiflexion;plantarflexion;sitting;10 repetitions;2 sets  -       Row Name             Wound 03/27/24 1400 Left lateral thigh Incision    Wound - Properties Group Placement Date: 03/27/24  -AR Placement Time: 1400  -AR Side: Left  -AR Orientation: lateral  -AR Location: thigh  -AR Primary Wound Type: Incision  -AR    Retired Wound - Properties Group Placement Date: 03/27/24  -AR Placement Time: 1400  -AR Side: Left  -AR Orientation: lateral  -AR Location: thigh  -AR Primary Wound Type: Incision  -AR    Retired Wound - Properties Group Date first assessed: 03/27/24  -AR Time first assessed: 1400  -AR Side: Left  -AR Location: thigh  -AR Primary Wound Type: Incision  -AR      Row Name             Wound 03/31/24 0935 Left midline gluteal Pressure Injury    Wound - Properties Group Placement Date: 03/31/24  -MH Placement Time: 0935  -MH Present on Original Admission: N  -MH Side: Left  -MH Orientation: midline  -MH Location: gluteal  -MH Primary Wound Type: Pressure inj  -MH    Retired Wound - Properties Group Placement Date:  03/31/24  - Placement Time: 0935  -MH Present on Original Admission: N  -MH Side: Left  -MH Orientation: midline  -MH Location: gluteal  -MH Primary Wound Type: Pressure inj  -MH    Retired Wound - Properties Group Date first assessed: 03/31/24  -MH Time first assessed: 0935  -MH Present on Original Admission: N  -MH Side: Left  -MH Location: gluteal  -MH Primary Wound Type: Pressure inj  -MH      Row Name 03/31/24 1545 03/31/24 1500       Positioning and Restraints    Pre-Treatment Position sitting in chair/recliner  -CS in bed  -CS    Post Treatment Position bed  -CS chair  -CS    In Bed fowlers;call light within reach;encouraged to call for assist;exit alarm on;with family/caregiver;legs elevated;heels elevated  -CS --    In Chair -- reclined;call light within reach;encouraged to call for assist;exit alarm on;with family/caregiver;legs elevated;heels elevated  -CS      Row Name 03/31/24 1500          Progress Summary (PT)    Progress Toward Functional Goals (PT) progress toward functional goals is gradual  -CS               User Key  (r) = Recorded By, (t) = Taken By, (c) = Cosigned By      Initials Name Provider Type    Uma Whitehead RN CSA Registered Nurse    Nancy Ayala RN Registered Nurse    Woody Fields PTA Physical Therapist Assistant                      PT Recommendation and Plan     Progress Summary (PT)  Progress Toward Functional Goals (PT): progress toward functional goals is gradual   Outcome Measures       Row Name 03/31/24 1500 03/30/24 1300 03/29/24 1600       How much help from another person do you currently need...    Turning from your back to your side while in flat bed without using bedrails? 2  -CS 2  -VK 2  -VK    Moving from lying on back to sitting on the side of a flat bed without bedrails? 2  -CS 2  -VK 1  -VK    Moving to and from a bed to a chair (including a wheelchair)? 2  -CS 2  -VK 1  -VK    Standing up from a chair using your arms (e.g., wheelchair, bedside  chair)? 2  -CS 2  -VK 2  -VK    Climbing 3-5 steps with a railing? 1  -CS 1  -VK 1  -VK    To walk in hospital room? 1  -CS 2  -VK 1  -VK    AM-PAC 6 Clicks Score (PT) 10  -CS 11  -VK 8  -VK    Highest Level of Mobility Goal 4 --> Transfer to chair/commode  -CS 4 --> Transfer to chair/commode  -VK 3 --> Sit at edge of bed  -VK       Functional Assessment    Outcome Measure Options AM-PAC 6 Clicks Basic Mobility (PT)  -CS -- --              User Key  (r) = Recorded By, (t) = Taken By, (c) = Cosigned By      Initials Name Provider Type    CS Woody Cook PTA Physical Therapist Assistant    Saira Lopez PTA Physical Therapist Assistant                     Time Calculation:    PT Charges       Row Name 03/31/24 1544 03/31/24 1541          Time Calculation    Start Time 1313  -CS 1215  -CS     PT Received On 03/31/24  -CS 03/31/24  -CS        Timed Charges    02573 - PT Therapeutic Exercise Minutes 13  -CS --     11625 - Gait Training Minutes  10  -CS 12  -CS     04167 - PT Therapeutic Activity Minutes 23  -CS 15  -CS        Total Minutes    Timed Charges Total Minutes 46  -CS 27  -CS      Total Minutes 46  -CS 27  -CS               User Key  (r) = Recorded By, (t) = Taken By, (c) = Cosigned By      Initials Name Provider Type    Woody Fields PTA Physical Therapist Assistant                  Therapy Charges for Today       Code Description Service Date Service Provider Modifiers Qty    11051143573 HC GAIT TRAINING EA 15 MIN 3/31/2024 Woody Cook, PTA GP 1    54503862436 HC PT THERAPEUTIC ACT EA 15 MIN 3/31/2024 Woody Cook, PTA GP 1    94097703381 HC PT THER PROC EA 15 MIN 3/31/2024 Woody Cook, PTA GP 1    98349432376 HC GAIT TRAINING EA 15 MIN 3/31/2024 Woody Cook, PTA GP 1    21308591316 HC PT THERAPEUTIC ACT EA 15 MIN 3/31/2024 Woody Cook, PTA GP 1            PT G-Codes  Outcome Measure Options: AM-PAC 6 Clicks Basic Mobility (PT)  AM-PAC 6 Clicks Score (PT): 10  AM-PAC 6  Clicks Score (OT): 12    Woody Cook, PTA  3/31/2024

## 2024-03-31 NOTE — PROGRESS NOTES
Orthopedic hemiarthroplasty for fracture progress Note    Assessment/Plan rehab evaluations, PT/OT, weight-bear as tolerated, fall precautions, DVT prophylaxis, awaiting rehab      Status post-left Turner hip arthroplasty: Doing well postoperatively.  However some mild confusion persists    Pain Relief: some relief    Continues current post-op course    Activity: up with assistance    Weight Bearing: WBAT     LOS: 0 days     Subjective     Post-Operative Day: 4 post-op me hip arthroplasty  Systemic or Specific Complaints: No Complaints    Objective     Vital signs in last 24 hours:  Vitals:    03/30/24 2328 03/31/24 0314 03/31/24 0628 03/31/24 0632   BP: 135/53 136/56     BP Location: Right arm Right arm     Patient Position: Lying Lying     Pulse: 80 79 88 87   Resp: 18 18 18 18   Temp: 98.1 °F (36.7 °C) 98.2 °F (36.8 °C)     TempSrc: Oral Oral     SpO2: 91% 94% 95%    Weight:       Height:            General: Resting well this morning   Neurovascular: Neurovascular intact  Capillary refill: Normal   Wound: Dressing is clean and dry no evidence of infection.   Range of Motion: Limited flexsion and Limited extension   DVT Exam: Calf soft, no sign of DVT      WBC   Date Value Ref Range Status   03/30/2024 9.38 3.40 - 10.80 10*3/mm3 Final     RBC   Date Value Ref Range Status   03/30/2024 3.31 (L) 4.14 - 5.80 10*6/mm3 Final     Hemoglobin   Date Value Ref Range Status   03/30/2024 10.1 (L) 13.0 - 17.7 g/dL Final     Hematocrit   Date Value Ref Range Status   03/30/2024 33.4 (L) 37.5 - 51.0 % Final     MCV   Date Value Ref Range Status   03/30/2024 100.9 (H) 79.0 - 97.0 fL Final     MCH   Date Value Ref Range Status   03/30/2024 30.5 26.6 - 33.0 pg Final     MCHC   Date Value Ref Range Status   03/30/2024 30.2 (L) 31.5 - 35.7 g/dL Final     RDW   Date Value Ref Range Status   03/30/2024 13.9 12.3 - 15.4 % Final     RDW-SD   Date Value Ref Range Status   03/30/2024 51.4 37.0 - 54.0 fl Final     MPV   Date Value Ref Range  "Status   03/30/2024 11.5 6.0 - 12.0 fL Final     Platelets   Date Value Ref Range Status   03/30/2024 160 140 - 450 10*3/mm3 Final     Neutrophil %   Date Value Ref Range Status   03/30/2024 81.2 (H) 42.7 - 76.0 % Final     Lymphocyte %   Date Value Ref Range Status   03/30/2024 9.2 (L) 19.6 - 45.3 % Final     Monocyte %   Date Value Ref Range Status   03/30/2024 8.1 5.0 - 12.0 % Final     Eosinophil %   Date Value Ref Range Status   03/30/2024 0.3 0.3 - 6.2 % Final     Basophil %   Date Value Ref Range Status   03/30/2024 0.5 0.0 - 1.5 % Final     Immature Grans %   Date Value Ref Range Status   03/30/2024 0.7 (H) 0.0 - 0.5 % Final     Neutrophils, Absolute   Date Value Ref Range Status   03/30/2024 7.61 (H) 1.70 - 7.00 10*3/mm3 Final     Lymphocytes, Absolute   Date Value Ref Range Status   03/30/2024 0.86 0.70 - 3.10 10*3/mm3 Final     Monocytes, Absolute   Date Value Ref Range Status   03/30/2024 0.76 0.10 - 0.90 10*3/mm3 Final     Eosinophils, Absolute   Date Value Ref Range Status   03/30/2024 0.03 0.00 - 0.40 10*3/mm3 Final     Basophils, Absolute   Date Value Ref Range Status   03/30/2024 0.05 0.00 - 0.20 10*3/mm3 Final     Immature Grans, Absolute   Date Value Ref Range Status   03/30/2024 0.07 (H) 0.00 - 0.05 10*3/mm3 Final     nRBC   Date Value Ref Range Status   03/30/2024 0.0 0.0 - 0.2 /100 WBC Final        Basic Metabolic Panel    Sodium Sodium   Date Value Ref Range Status   03/31/2024 140 136 - 145 mmol/L Final   03/30/2024 138 136 - 145 mmol/L Final   03/29/2024 138 136 - 145 mmol/L Final      Potassium Potassium   Date Value Ref Range Status   03/31/2024 3.5 3.5 - 5.2 mmol/L Final   03/30/2024 3.6 3.5 - 5.2 mmol/L Final   03/29/2024 3.9 3.5 - 5.2 mmol/L Final      Chloride Chloride   Date Value Ref Range Status   03/31/2024 92 (L) 98 - 107 mmol/L Final   03/30/2024 94 (L) 98 - 107 mmol/L Final   03/29/2024 97 (L) 98 - 107 mmol/L Final      Bicarbonate No results found for: \"PLASMABICARB\"   BUN BUN " "  Date Value Ref Range Status   03/31/2024 36 (H) 8 - 23 mg/dL Final   03/30/2024 32 (H) 8 - 23 mg/dL Final   03/29/2024 35 (H) 8 - 23 mg/dL Final      Creatinine Creatinine   Date Value Ref Range Status   03/31/2024 1.33 (H) 0.76 - 1.27 mg/dL Final   03/30/2024 1.54 (H) 0.76 - 1.27 mg/dL Final   03/29/2024 1.54 (H) 0.76 - 1.27 mg/dL Final      Calcium Calcium   Date Value Ref Range Status   03/31/2024 9.2 8.6 - 10.5 mg/dL Final   03/30/2024 8.7 8.6 - 10.5 mg/dL Final   03/29/2024 8.5 (L) 8.6 - 10.5 mg/dL Final      Glucose      No components found for: \"GLUCOSE.*\"      CT Chest Without Contrast Diagnostic   Final Result   1. Findings consistent with volume overload/third spacing including   interstitial pulmonary edema and small bilateral pleural effusions.   2. Dependent subpleural opacities in bilateral upper and lower lobes,   presumed atelectasis however correlate for clinical signs of developing   infection.   3. Mildly patulous debris-filled esophagus noted to the level of the   thoracic inlet. Debris also noted within the lower trachea and mainstem   bronchi. Patient is at high risk for aspiration. Recommend aspiration   precautions.       Recommend continuing annual low-dose chest CT lung screening on an   outpatient basis. Previous screening comparison 8/16/2022.                       Electronically Signed ByUna Oneil MD On:3/28/2024 4:14 PM          US Renal Bilateral   Final Result   1. Normal size kidneys without hydronephrosis.   2. Small subcentimeter cyst along the cortex of the left kidney, similar   to 2020.       Electronically Signed ByAnny Girard MD On:3/28/2024 1:50 PM          NM Lung Scan Perfusion Particulate   Final Result   1. No peripheral wedge-shaped defects to suggest presence of underlying   pulmonary embolism. Very low probability study per perfusion only PIOPED   2 criteria.           Electronically Signed ByAnny Girard MD On:3/28/2024 1:59 PM          XR Chest " 1 View   Final Result   Impression:   Increased diffuse interstitial opacities which may represent a component   of interstitial edema. No focal consolidation or pleural effusion.           Electronically Signed By-Kvng Beth MD On:3/27/2024 6:00 PM          XR Hip With or Without Pelvis 2 - 3 View Left   Final Result   Impression:   1.  Status post left hip arthroplasty.   2.  No immediate postoperative complication is identified.           Electronically Signed By-Rafael Pimentel MD On:3/27/2024 3:39 PM          FL Surgery Fluoro   Final Result      CT Pelvis Without Contrast   Final Result   Impression:   1.  Nondisplaced left femoral neck fracture   2.  Degenerative changes are noted involving the sacroiliac joints and   lower lumbar spine.   3.  Colonic diverticulosis.   4.  Enlarged prostate   5.  Left inguinal hernia containing fat.               Electronically Signed By-Aldo العراقي MD On:3/26/2024 6:15 PM          XR Chest 1 View   Final Result   No acute cardiopulmonary process identified.           Electronically Signed By-Osito Dutton MD On:3/26/2024 4:32 PM          XR Hip With or Without Pelvis 2 - 3 View Left   Final Result   1.  There is appearance to the left femur that is concerning for femoral   neck fracture with suggested foreshortening of the femoral neck.   Additional imaging may be helpful to better assess this.           Electronically Signed By-Aldo العراقي MD On:3/26/2024 4:34 PM

## 2024-03-31 NOTE — PROGRESS NOTES
[facility]   Hospitalist Progress Note  Date: 3/31/2024  Patient Name: Clark Layton  : 1940  MRN: [id number]  Date of admission: 3/26/2024      Subjective   Subjective     Chief Complaint: Fall at home and left hip pain    Summary:   Clark Layton is a 83 y.o. male with past medical history of hypertension, Alzheimer's hyperlipidemia, BPH, nicotine dependence, and GERD presented to the ED after a fall resulting in left hip injury.  Patient has advanced dementia so history was given by wife at bedside.  As per wife he was sitting on a stool on the kitchen island when he fell asleep and fell to the floor.  Patient with severe pain afterwards and was then transferred to the ED for further evaluation.  In the ED patient was hypoxic on arrival with remaining vitals being within normal limits.  Labs show that he had mild leukocytosis with remaining labs being relatively unremarkable.  X-ray of the hip showed a left femoral neck fracture with CT confirming nondisplaced left femoral neck fracture.  Orthopedic surgery was consulted and agreed to see the patient in the morning.  Patient admitted for further evaluation and treatment .  Patient had left hip anterior arthroplasty on .  Due to worsening kidney function and hypoxemia nephrology and pulmonary consulted.    Interval Followup:   Patient is still having significant confusion, decreased appetite.  Has had difficulty working with therapy due to his confusion.  This morning, he tells me he has no complaints and rested well last night.  Crowder catheter removed.  Had a long discussion with the family about goals of care and how to proceed should he worsen.     Objective   Objective     Vitals:   Temp:  [97.9 °F (36.6 °C)-98.8 °F (37.1 °C)] 98.8 °F (37.1 °C)  Heart Rate:  [79-95] 95  Resp:  [18] 18  BP: (134-154)/(53-66) 136/64  Flow (L/min):  [2] 2  Physical Exam    Constitutional: resting comfortably in bed, awake and alert, NAD   HENT:  NCAT   Respiratory: Essentially clear, nasal cannula in place, no increased WOB   Cardiovascular: RRR, no mrg    Gastrointestinal: Soft, nontender nondistended    Result Review    I have personally reviewed the results for the past 24 hours and agree with these findings:  [x]  Laboratory personally reviewed BMP, magnesium, phosphorus  CBC          3/28/2024    02:17 3/29/2024    03:37 3/30/2024    02:13   CBC   WBC 13.67  8.83  9.38    RBC 3.48  3.10  3.31    Hemoglobin 10.9  10.0  10.1    Hematocrit 35.1  30.9  33.4    .9  99.7  100.9    MCH 31.3  32.3  30.5    MCHC 31.1  32.4  30.2    RDW 14.6  14.4  13.9    Platelets 161  150  160      CMP          3/29/2024    03:37 3/30/2024    02:13 3/31/2024    02:30   CMP   Glucose 101  132  158    BUN 35  32  36    Creatinine 1.54  1.54  1.33    EGFR 45.0  45.0  53.7    Sodium 138  138  140    Potassium 3.9  3.6  3.5    Chloride 97  94  92    Calcium 8.5  8.7  9.2    Albumin 3.0  3.2  3.3    BUN/Creatinine Ratio 22.7  20.8  27.1    Anion Gap 6.6  6.5  12.0      []  Microbiology  [x]  Radiology  [x]  EKG/Telemetry   []  Cardiology/Vascular   []  Pathology  [x]  Old records  [x]  Other: Medications    Assessment & Plan   Assessment / Plan   Fall at home from sitting position with left hip pain.  Left displaced femoral neck fracture.  S/p left hip bipolar anterior arthroplasty on March 27.  Acute metabolic encephalopathy.  Hypoxia.  No home oxygen use.  Clinically improved down to 2 L/min on this morning.    Acute volume overload  Possibly undiagnosed COPD  Tobacco use disorder.  Hypertension  Gout  Hyperlipidemia.  Alzheimer's dementia.  BPH  Hematuria due to Crowder catheter.  Leukocytosis.  Likely reactive.  Appears resolved.  Acute kidney injury baseline creatinine around 1.3.  Creatinine improving  Elevated D dimer in the postop patient.  Anemia.  Postop and from IV hydration.  Today she is stable.     Continue to monitor in the hospital for workup and management of  the above  Discussed with nephrology, he appears euvolemic so we will transition IV diuretics to Lasix 80 mg p.o. twice daily  Replace magnesium IV  Continue with Pulmicort and Brovana twice daily, DuoNebs as needed  Oxygenation improving, continue to wean oxygen as tolerated keep sats greater than 90%  Will need walk test prior to discharge  Oral and IV narcotics  for pain control on as-needed basis.  Completed course of antibiotics  As needed Haldol for agitation  Continue home Flomax and allopurinol  DC telemetry, DC Crowder catheter, DC continuous pulse ox.  Minimize tubes and wires attached to him.  Delirium precautions  Discussed at length with family, appetite stimulant is not ideal in this patient population and would not benefit him and may cause more side effects.  If he is unable to work with therapy, rehab would lead to minimal improvement.  Given his advanced dementia, his prognosis is guarded  Consult palliative care for continued goals of care discussions.  Currently, the plan is to still try to send him to rehab when medically able  Continue scheduled bowel regimen.  Speech therapy to evaluate for possible aspiration  PT/OT following-recommend inpatient rehab facility at discharge  Trend renal function and electrolytes with a.m. BMP, magnesium   Trend Hgb and WBC with a.m. CBC    Discussed case with: Bedside RN, nephrology, orthopedic surgery    Total of 53 minutes spent reviewing labs and imaging, counseling and educating the patient and family, ordering medications, discussing with specialty services, documenting clinical information in the electronic medical record, and care coordination.    DVT prophylaxis:  Medical and mechanical DVT prophylaxis orders are present.    CODE STATUS:   Medical Intervention Limits: NO intubation (DNI); NO cardioversion  Code Status (Patient has no pulse and is not breathing): No CPR (Do Not Attempt to Resuscitate)  Medical Interventions (Patient has pulse or is  breathing): Limited Support

## 2024-03-31 NOTE — THERAPY TREATMENT NOTE
Acute Care - Physical Therapy Progress Note   [place]     Patient Name: Clark Layton  : 1940  MRN: [id number]  Today's Date: 3/31/2024      Visit Dx:     ICD-10-CM ICD-9-CM   1. Left displaced femoral neck fracture  S72.002A 820.8   2. Fall from chair, initial encounter  W07.XXXA E884.2   3. Dementia without behavioral disturbance, psychotic disturbance, mood disturbance, or anxiety, unspecified dementia severity, unspecified dementia type  F03.90 294.20   4. Impaired mobility and ADLs  Z74.09 V49.89    Z78.9    5. Difficulty in walking  R26.2 719.7   6. Dysphagia, unspecified type  R13.10 787.20     Patient Active Problem List   Diagnosis    Nicotine dependence, cigarettes, with other nicotine-induced disorders    High cholesterol    Gout    Essential hypertension    Medication management    Benign prostatic hyperplasia    Left displaced femoral neck fracture    Hip fracture    History of dementia     Past Medical History:   Diagnosis Date    Essential hypertension     Gout     High cholesterol 2018    Medication management 2018    Nicotine dependence, cigarettes, with other nicotine-induced disorders 2018     Past Surgical History:   Procedure Laterality Date    HIP BIPOLAR REPLACEMENT Left 3/27/2024    Procedure: HIP BIPOLAR ANTERIOR;  Surgeon: Bethany Carlson MD;  [facility] MAIN OR;  Service: Orthopedics;  Laterality: Left;     PT Assessment (Last 12 Hours)       PT Evaluation and Treatment       Row Name 24 1500          Physical Therapy Time and Intention    Subjective Information no complaints  -CS     Document Type therapy note (daily note)  -CS     Mode of Treatment individual therapy;physical therapy  -CS     Patient Effort fair  -CS     Symptoms Noted During/After Treatment fatigue  -CS       Row Name 24 1500          Bed Mobility    Supine-Sit [place] (Bed Mobility) verbal cues;maximum assist (25% patient effort);1 person assist  -CS     Bed Mobility,  Safety Issues cognitive deficits limit understanding;decreased use of arms for pushing/pulling;decreased use of legs for bridging/pushing  -CS     Assistive Device (Bed Mobility) bed rails;draw sheet;head of bed elevated  -CS       Row Name 03/31/24 1500          Bed-Chair Transfer    Bed-Chair [place] (Transfers) verbal cues;maximum assist (25% patient effort);1 person assist  -CS     Assistive Device (Bed-Chair Transfers) walker, front-wheeled  -CS       Row Name 03/31/24 1500          Sit-Stand Transfer    Sit-Stand [place] (Transfers) verbal cues;nonverbal cues (demo/gesture);maximum assist (25% patient effort);1 person assist  -CS     Assistive Device (Sit-Stand Transfers) walker, front-wheeled  -CS     Comment, (Sit-Stand Transfer) x 4 reps  -CS       Row Name 03/31/24 1500          Stand-Sit Transfer    Stand-Sit [place] (Transfers) verbal cues;moderate assist (50% patient effort);1 person assist  -CS     Assistive Device (Stand-Sit Transfers) walker, front-wheeled  -CS       Row Name             Wound 03/27/24 1400 Left lateral thigh Incision    Wound - Properties Group Placement Date: 03/27/24  -AR Placement Time: 1400  -AR Side: Left  -AR Orientation: lateral  -AR Location: thigh  -AR Primary Wound Type: Incision  -AR    Retired Wound - Properties Group Placement Date: 03/27/24  -AR Placement Time: 1400  -AR Side: Left  -AR Orientation: lateral  -AR Location: thigh  -AR Primary Wound Type: Incision  -AR    Retired Wound - Properties Group Date first assessed: 03/27/24  -AR Time first assessed: 1400  -AR Side: Left  -AR Location: thigh  -AR Primary Wound Type: Incision  -AR      Row Name             Wound 03/31/24 0935 Left midline gluteal Pressure Injury    Wound - Properties Group Placement Date: 03/31/24  -MH Placement Time: 0935  -MH Present on Original Admission: N  -MH Side: Left  -MH Orientation: midline  -MH Location: gluteal  -MH Primary Wound Type: Pressure inj  -MH    Retired Wound  - Properties Group Placement Date: 03/31/24  - Placement Time: 0935 -MH Present on Original Admission: N  -MH Side: Left  -MH Orientation: midline  -MH Location: gluteal  -MH Primary Wound Type: Pressure inj  -MH    Retired Wound - Properties Group Date first assessed: 03/31/24  - Time first assessed: 0935  -MH Present on Original Admission: N  -MH Side: Left  -MH Location: gluteal  -MH Primary Wound Type: Pressure inj  -MH      Row Name 03/31/24 1500          Positioning and Restraints    Pre-Treatment Position in bed  -CS     Post Treatment Position chair  -CS     In Chair reclined;call light within reach;encouraged to call for assist;exit alarm on;with family/caregiver;legs elevated;heels elevated  -CS       Row Name 03/31/24 1500          Progress Summary (PT)    Progress Toward Functional Goals (PT) progress toward functional goals is gradual  -CS               User Key  (r) = Recorded By, (t) = Taken By, (c) = Cosigned By      Initials Name Provider Type    JARETT Green, April, RN CSA Registered Nurse    Nancy Ayala RN Registered Nurse    Woody Fields PTA Physical Therapist Assistant                      PT Recommendation and Plan     Progress Summary (PT)  Progress Toward Functional Goals (PT): progress toward functional goals is gradual   Outcome Measures       Row Name 03/31/24 1500 03/30/24 1300 03/29/24 1600       How much help from another person do you currently need...    Turning from your back to your side while in flat bed without using bedrails? 2  -CS 2  -VK 2  -VK    Moving from lying on back to sitting on the side of a flat bed without bedrails? 2  -CS 2  -VK 1  -VK    Moving to and from a bed to a chair (including a wheelchair)? 2  -CS 2  -VK 1  -VK    Standing up from a chair using your arms (e.g., wheelchair, bedside chair)? 2  -CS 2  -VK 2  -VK    Climbing 3-5 steps with a railing? 1  -CS 1  -VK 1  -VK    To walk in hospital room? 1  -CS 2  -VK 1  -VK    AM-PAC 6 Clicks Score  (PT) 10  -CS 11  -VK 8  -VK    Highest Level of Mobility Goal 4 --> Transfer to chair/commode  -CS 4 --> Transfer to chair/commode  -VK 3 --> Sit at edge of bed  -VK       Functional Assessment    Outcome Measure Options AM-PAC 6 Clicks Basic Mobility (PT)  -CS -- --              User Key  (r) = Recorded By, (t) = Taken By, (c) = Cosigned By      Initials Name Provider Type    CS Woody Cook PTA Physical Therapist Assistant    Saira Lopez PTA Physical Therapist Assistant                     Time Calculation:    PT Charges       Row Name 03/31/24 1541             Time Calculation    Start Time 1215  -CS      PT Received On 03/31/24  -CS         Timed Charges    09605 - Gait Training Minutes  12  -CS      05821 - PT Therapeutic Activity Minutes 15  -CS         Total Minutes    Timed Charges Total Minutes 27  -CS       Total Minutes 27  -CS                User Key  (r) = Recorded By, (t) = Taken By, (c) = Cosigned By      Initials Name Provider Type    CS Woody Cook PTA Physical Therapist Assistant                  Therapy Charges for Today       Code Description Service Date Service Provider Modifiers Qty    81777220598 HC GAIT TRAINING EA 15 MIN 3/31/2024 Woody Cook PTA GP 1    25588357155 HC PT THERAPEUTIC ACT EA 15 MIN 3/31/2024 Woody Cook PTA GP 1            PT G-Codes  Outcome Measure Options: AM-PAC 6 Clicks Basic Mobility (PT)  AM-PAC 6 Clicks Score (PT): 10  AM-PAC 6 Clicks Score (OT): 12    Woody Cook PTA  3/31/2024

## 2024-03-31 NOTE — PLAN OF CARE
Goal Outcome Evaluation:      No significant change noted this shift. VSS. Will continue POC.

## 2024-04-01 LAB
ALBUMIN SERPL-MCNC: 3.2 G/DL (ref 3.5–5.2)
ANION GAP SERPL CALCULATED.3IONS-SCNC: 8.7 MMOL/L (ref 5–15)
BUN SERPL-MCNC: 45 MG/DL (ref 8–23)
BUN/CREAT SERPL: 31 (ref 7–25)
CALCIUM SPEC-SCNC: 9.1 MG/DL (ref 8.6–10.5)
CHLORIDE SERPL-SCNC: 95 MMOL/L (ref 98–107)
CO2 SERPL-SCNC: 38.3 MMOL/L (ref 22–29)
CREAT SERPL-MCNC: 1.45 MG/DL (ref 0.76–1.27)
EGFRCR SERPLBLD CKD-EPI 2021: 48.4 ML/MIN/1.73
GLUCOSE SERPL-MCNC: 132 MG/DL (ref 65–99)
PHOSPHATE SERPL-MCNC: 3.4 MG/DL (ref 2.5–4.5)
POTASSIUM SERPL-SCNC: 3.5 MMOL/L (ref 3.5–5.2)
QT INTERVAL: 385 MS
QTC INTERVAL: 456 MS
SODIUM SERPL-SCNC: 142 MMOL/L (ref 136–145)

## 2024-04-01 PROCEDURE — 93005 ELECTROCARDIOGRAM TRACING: CPT | Performed by: STUDENT IN AN ORGANIZED HEALTH CARE EDUCATION/TRAINING PROGRAM

## 2024-04-01 PROCEDURE — 99232 SBSQ HOSP IP/OBS MODERATE 35: CPT | Performed by: INTERNAL MEDICINE

## 2024-04-01 PROCEDURE — 94664 DEMO&/EVAL PT USE INHALER: CPT

## 2024-04-01 PROCEDURE — 94799 UNLISTED PULMONARY SVC/PX: CPT

## 2024-04-01 PROCEDURE — 93010 ELECTROCARDIOGRAM REPORT: CPT | Performed by: INTERNAL MEDICINE

## 2024-04-01 PROCEDURE — 25010000002 ENOXAPARIN PER 10 MG: Performed by: INTERNAL MEDICINE

## 2024-04-01 PROCEDURE — 99232 SBSQ HOSP IP/OBS MODERATE 35: CPT | Performed by: STUDENT IN AN ORGANIZED HEALTH CARE EDUCATION/TRAINING PROGRAM

## 2024-04-01 PROCEDURE — 97530 THERAPEUTIC ACTIVITIES: CPT

## 2024-04-01 PROCEDURE — 94761 N-INVAS EAR/PLS OXIMETRY MLT: CPT

## 2024-04-01 PROCEDURE — 97110 THERAPEUTIC EXERCISES: CPT

## 2024-04-01 PROCEDURE — 80069 RENAL FUNCTION PANEL: CPT | Performed by: INTERNAL MEDICINE

## 2024-04-01 RX ORDER — FUROSEMIDE 40 MG/1
40 TABLET ORAL
Status: DISCONTINUED | OUTPATIENT
Start: 2024-04-01 | End: 2024-04-02

## 2024-04-01 RX ADMIN — FUROSEMIDE 40 MG: 40 TABLET ORAL at 18:22

## 2024-04-01 RX ADMIN — BUDESONIDE 0.5 MG: 0.5 INHALANT ORAL at 19:25

## 2024-04-01 RX ADMIN — ALLOPURINOL 300 MG: 300 TABLET ORAL at 10:00

## 2024-04-01 RX ADMIN — ARFORMOTEROL TARTRATE 15 MCG: 15 SOLUTION RESPIRATORY (INHALATION) at 07:11

## 2024-04-01 RX ADMIN — ENOXAPARIN SODIUM 30 MG: 100 INJECTION SUBCUTANEOUS at 10:01

## 2024-04-01 RX ADMIN — Medication 10 ML: at 21:28

## 2024-04-01 RX ADMIN — BUDESONIDE 0.5 MG: 0.5 INHALANT ORAL at 07:12

## 2024-04-01 RX ADMIN — FUROSEMIDE 40 MG: 40 TABLET ORAL at 10:00

## 2024-04-01 RX ADMIN — TAMSULOSIN HYDROCHLORIDE 0.4 MG: 0.4 CAPSULE ORAL at 10:00

## 2024-04-01 RX ADMIN — FERROUS SULFATE TAB 325 MG (65 MG ELEMENTAL FE) 325 MG: 325 (65 FE) TAB at 10:00

## 2024-04-01 RX ADMIN — Medication 10 ML: at 10:01

## 2024-04-01 RX ADMIN — NICOTINE 1 PATCH: 21 PATCH, EXTENDED RELEASE TRANSDERMAL at 10:03

## 2024-04-01 RX ADMIN — ARFORMOTEROL TARTRATE 15 MCG: 15 SOLUTION RESPIRATORY (INHALATION) at 19:25

## 2024-04-01 RX ADMIN — FAMOTIDINE 20 MG: 20 TABLET ORAL at 10:00

## 2024-04-01 NOTE — PLAN OF CARE
Goal Outcome Evaluation:patient   Progress: no change    Pt. Resting quietly.  No c/o's.  Pt. Has been repositioned throughout the night, skin care performed.  Pt. Voiding  and Has been incontinent of urine and stool this shift.  Pt.'s heart rate was elevated to 136 then 126 EKG done and flex monitor applied.  No significant changes noted this shift.

## 2024-04-01 NOTE — PROGRESS NOTES
Pulmonary / Critical Care Progress Note      Patient Name: Clark Layton  : 1940  MRN: [id number]  Attending:  Aleks Carcamo MD  Date of admission: 3/26/2024    Subjective   Subjective   Follow-up for acute hypoxic respiratory failure    No acute events overnight    This morning,  On 2 L nasal cannula  Currently being changed  Has had good urine output over the last few days.  However, only 100 cc documented over the last 24 hours.  Updated family members      Objective   Objective     Vitals:   Temp:  [97.7 °F (36.5 °C)-98.8 °F (37.1 °C)] 98.8 °F (37.1 °C)  Heart Rate:  [] 88  Resp:  [16-18] 16  BP: (108-134)/(46-69) 125/61  Flow (L/min):  [2] 2    Physical Exam   Vital Signs Reviewed   General:  WDWN, Alert, elderly male, lying in bed  HEENT:  PERRL, EOMI.  OP, nares clear  Chest:  good aeration, clear to auscultation bilaterally, no work of breathing noted on 2 L  CV: RRR, no MGR, pulses 2+, equal.  Abd:  Soft, NT, ND, + BS  EXT:  no clubbing, no cyanosis, no edema  Neuro:  A&Ox3, CN grossly intact, no focal deficits.  Skin: No rashes or lesions noted      Result Review    Result Review:  I have personally reviewed the results from the time of this admission to 2024 15:37 EDT and agree with these findings:  [x]  Laboratory  []  Microbiology  [x]  Radiology  []  EKG/Telemetry   []  Cardiology/Vascular   []  Pathology  []  Old records  []  Other:  Most notable findings include:       Lab 24  0222 24  0230 24  0213 24  0350 24  0337 24  0217 24  1553   WBC  --   --  9.38  --  8.83 13.67* 14.30*   HEMOGLOBIN  --   --  10.1*  --  10.0* 10.9* 14.2   HEMATOCRIT  --   --  33.4* 31.2* 30.9* 35.1* 45.2   PLATELETS  --   --  160  --  150 161 230   SODIUM 142 140 138  --  138 137 138   POTASSIUM 3.5 3.5 3.6  --  3.9 4.7 4.4   CHLORIDE 95* 92* 94*  --  97* 100 97*   CO2 38.3* 36.0* 37.5*  --  34.4* 28.9 32.1*   BUN 45* 36* 32*  --  35* 35* 21   CREATININE 1.45*  1.33* 1.54*  --  1.54* 2.02* 1.23   GLUCOSE 132* 158* 132*  --  101* 115* 122*   CALCIUM 9.1 9.2 8.7  --  8.5* 8.1* 9.4   PHOSPHORUS 3.4 3.3  3.3 4.2  --  2.8 4.8*  --    TOTAL PROTEIN  --   --   --   --   --   --  7.4   ALBUMIN 3.2* 3.3* 3.2*  --  3.0* 3.0* 4.1   GLOBULIN  --   --   --   --   --   --  3.3     Chest x-ray with bibasilar atelectasis    CT Chest Without Contrast Diagnostic    Result Date: 3/28/2024  EXAM:CT CHEST WO CONTRAST DIAGNOSTIC-  DATE OF EXAM: 3/28/2024 3:56 PM  INDICATION: Postop hypoxia; S72.002A-Fracture of unspecified part of neck of left femur, initial encounter for closed fracture; W07.XXXA-Fall from chair, initial encounter; F03.90-Unspecified dementia, unspecified severity, without behavioral disturbance, psychotic disturbance, mood disturbance, and anxiety; Z74.09-Other reduced mobility; Z78.9-Other specified health status; R26.2-Difficulty in walking, not .  COMPARISON: Chest radiograph 3/27/2024, chest CT 8/16/2022.  TECHNIQUE: Serial and axial CT images of the chest were obtained. Reconstructions in the coronal and sagittal planes were performed. Automated exposure control and iterative reconstruction methods were used.  FINDINGS: Thyroid unremarkable. No supraclavicular adenopathy. Mildly patulous thoracic esophagus with debris noted up to the level of thoracic inlet. Possible small sliding hiatal hernia. Heart size within normal limits. Aortic valve leaflet calcifications. Moderate to severe three-vessel coronary atherosclerotic disease. No pericardial effusion. Aortic atherosclerotic disease without aneurysm. Normal caliber main pulmonary artery. No suspicious mediastinal or hilar adenopathy.  Debris noted in the lower trachea and mainstem bronchi. Small low-density bilateral pleural effusions. Mild bilateral smooth interlobar septal thickening. Underlying centrilobular emphysema. Dependent subpleural opacities in the bilateral upper and bilateral lower lobes. No pneumothorax.  No overtly suspicious nodule.  Sequelae of prior granulomatous disease in the liver and spleen. Grossly unchanged left adrenal nodule since 2022 comparison suggesting benign adenomas. Degenerative related changes throughout the spine with probable superimposed diffuse idiopathic skeletal hyperostosis.      Impression: 1. Findings consistent with volume overload/third spacing including interstitial pulmonary edema and small bilateral pleural effusions. 2. Dependent subpleural opacities in bilateral upper and lower lobes, presumed atelectasis however correlate for clinical signs of developing infection. 3. Mildly patulous debris-filled esophagus noted to the level of the thoracic inlet. Debris also noted within the lower trachea and mainstem bronchi. Patient is at high risk for aspiration. Recommend aspiration precautions.  Recommend continuing annual low-dose chest CT lung screening on an outpatient basis. Previous screening comparison 8/16/2022.      Electronically Signed By-Denzel Oneil MD On:3/28/2024 4:14 PM         Assessment & Plan   Assessment / Plan     Active Hospital Problems:  Active Hospital Problems    Diagnosis     **Hip fracture     History of dementia     Gout     Essential hypertension     Nicotine dependence, cigarettes, with other nicotine-induced disorders        Impression:  Acute hypoxic and hypercapnic respiratory failure requiring high flow  Concern for pulmonary embolus  Acute cardiogenic pulm edema  Acute decompensated diastolic heart failure  Small bilateral pleural effusions  Bibasilar atelectasis  Hypokalemia  Mild aortic valve stenosis  S/p left hip hemiarthroplasty  Tobacco abuse of cigarettes  Concern for undiagnosed COPD  Alzheimer's dementia  Mechanical fall     Plan:  -On 2 L nasal cannula.  Continue Xopenex to make SpO2 greater than 90%.  -May need oxygen on discharge.  6-minute walk test if able to do so.  If not, can document hypoxia on ABG.  -Continue Mulu Kelly,  and DuoNebs  -Continue Lasix 20 mg p.o. daily  -Continue to monitor renal panel and electrolytes.   -Continue bronchopulmonary bronchodilator protocols.  Encourage I-S  -Encourage mobilization.  Out of bed to chair  -PT/OT/SLP on board.  Appreciate assistance  -Encouraged tobacco cessation  -Nephrology following, appreciate input  -Orthopedics following, appreciate input    Pulmonary will sign off for now.  Please call with questions.    DVT prophylaxis:  Medical and mechanical DVT prophylaxis orders are present.    CODE STATUS:   Medical Intervention Limits: NO intubation (DNI); NO cardioversion  Code Status (Patient has no pulse and is not breathing): No CPR (Do Not Attempt to Resuscitate)  Medical Interventions (Patient has pulse or is breathing): Limited Support      Labs, imaging, microbiology, notes and medications personally reviewed  Discussed with primary  Electronically signed by Radha Li MD, 04/01/24, 3:38 PM EDT.

## 2024-04-01 NOTE — PLAN OF CARE
Goal Outcome Evaluation:            Pt up to chair today. Pt tolerating po intake without nausea/vomiting. Pt complaint of lack of appetite. Pt having no complaints of pain. Will continue to monitor.     Bhavya Mallory RN

## 2024-04-01 NOTE — PROGRESS NOTES
[facility]     Progress Note    Patient Name: Clark Layton  : 1940  MRN: [id number]  Primary Care Physician:  Hoonrio Field MD  Date of admission: 3/26/2024    Subjective   Patient's Crowder was removed  Continue to have brisk urine output  Bicarb noted to be elevated  Heart rate noted to be elevated intermittently overnight    Scheduled Meds:allopurinol, 300 mg, Oral, Daily  arformoterol, 15 mcg, Nebulization, BID - RT  budesonide, 0.5 mg, Nebulization, BID - RT  enoxaparin, 30 mg, Subcutaneous, Daily  famotidine, 20 mg, Oral, Daily  ferrous sulfate, 325 mg, Oral, Daily With Breakfast  furosemide, 40 mg, Oral, BID  nicotine, 1 patch, Transdermal, Q24H  polyethylene glycol, 17 g, Oral, Daily  senna-docusate sodium, 2 tablet, Oral, BID  sodium chloride, 10 mL, Intravenous, Q12H  sodium chloride, 10 mL, Intravenous, Q12H  tamsulosin, 0.4 mg, Oral, Daily      Continuous Infusions:   PRN Meds:.  acetaminophen **OR** acetaminophen **OR** acetaminophen    acetaminophen    aluminum-magnesium hydroxide-simethicone    senna-docusate sodium **AND** polyethylene glycol **AND** bisacodyl **AND** bisacodyl    haloperidol lactate    HYDROcodone-acetaminophen    HYDROcodone-acetaminophen    ipratropium-albuterol    magnesium hydroxide    melatonin    Morphine **AND** [DISCONTINUED] naloxone    Morphine **AND** naloxone    ondansetron ODT **OR** ondansetron    promethazine **OR** promethazine    [COMPLETED] Insert Peripheral IV **AND** sodium chloride    sodium chloride    sodium chloride    sodium chloride    sodium chloride       Review of Systems  Constitutional:        Weakness tiredness fatigue  Eyes:                       No blurry vision, eye discharge, eye irritation, eye pain  HEENT:                   No acute hair loss, earache and discharge, nasal congestion or discharge, sore throat, postnasal drip  Respiratory:           No shortness of breath coughing sputum production wheezing hemoptysis  pleuritic chest pain  Cardiovascular:     No chest pain, orthopnea, PND, dizziness, palpitation, lower extremity edema  Gastrointestinal:   No nausea vomiting diarrhea abdominal pain constipation  Genitourinary:       No urinary incontinence, hesitancy, frequency, urgency, dysuria  Hematologic:         No bruising, bleeding, pallor, lymphadenopathy  Endocrine:            No coldness, hot flashes, polyuria, abnormal hair growth  Musculoskeletal:    No body pains, aches, arthritic pains, muscle pain ,muscle wasting  Psychiatric:          No low or high mood, anxiety, hallucinations, delusions  Skin.                      No rash, ulcers, bruising, itching  Neurological:        No confusion, headache, focal weakness, numbness, dysphasia    Objective   Objective     Vitals:   Temp:  [97.7 °F (36.5 °C)-99.3 °F (37.4 °C)] 98.1 °F (36.7 °C)  Heart Rate:  [] 89  Resp:  [16-18] 16  BP: (108-176)/(59-77) 108/66  Flow (L/min):  [2] 2  Physical Exam    Constitutional: Awake, alert responsive, conversant, no obvious distress              Psychiatric:  Appropriate affect, cooperative   Neurologic:  Awake alert ,oriented x 3, strength symmetric in all extremities, Cranial Nerves grossly intact to confrontation, speech clear   Eyes:   PERRLA, sclerae anicteric, no conjunctival injection   HEENT:  Moist mucous membranes, no nasal or eye discharge, no throat congestion   Neck:   Supple, no thyromegaly, no lymphadenopathy, trachea midline, no elevated JVD   Respiratory:  Clear to auscultation bilaterally, nonlabored respirations    Cardiovascular: RRR, no murmurs, rubs, or gallops, palpable pedal pulses bilaterally, No bilateral ankle edema   Gastrointestinal: Positive bowel sounds, soft, nontender, nondistended, no organomegaly   Musculoskeletal:  No clubbing or cyanosis to extremities,muscle wasting, joint swelling, muscle weakness             Skin:                      No rashes, bruising, skin ulcers, petechiae or  ecchymosis    Result Review    Result Review:  I have personally reviewed the results from the time of this admission to 4/1/2024 07:39 EDT and agree with these findings:  []  Laboratory  []  Microbiology  []  Radiology  []  EKG/Telemetry   []  Cardiology/Vascular   []  Pathology  []  Old records  []  Other:    Assessment & Plan   Assessment / Plan       Active Hospital Problems:  Active Hospital Problems    Diagnosis     **Hip fracture     History of dementia     Gout     Essential hypertension     Nicotine dependence, cigarettes, with other nicotine-induced disorders      83-year-old male with past medical history of hypertension, hyperuricemia, BPH, CKD stage IIIa 1.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  Noted to have an BÁRBARA with creatinine rise to 2 with chest x-ray showing some diffuse infiltrates with elevated D-dimer.  BÁRBARA most likely due to relative hypotension and hemodynamic changes.  Patient's proBNP normal at 600 urine analysis with many hyaline cast likely in the setting of diuresis.  CT scan concerning for possible volume overload.  Nuclear scan was negative for PE.  Echo with EF of 60%.  Renal ultrasound was negative.  With diuresis improvement noted     Plan:   Decrease Lasix to 40 mg twice daily

## 2024-04-01 NOTE — CONSULTS
"Nutrition Services    Patient Name: Clark Layton  YOB: 1940  MRN: [id number]  Admission date: 3/26/2024      CLINICAL NUTRITION ASSESSMENT      Reason for Assessment  Chronic Poor Intake     H&P:  Past Medical History:   Diagnosis Date    Essential hypertension     Gout     High cholesterol 12/19/2018    Medication management 12/19/2018    Nicotine dependence, cigarettes, with other nicotine-induced disorders 09/19/2018        Current Problems:   Active Hospital Problems    Diagnosis     **Hip fracture     History of dementia     Gout     Essential hypertension     Nicotine dependence, cigarettes, with other nicotine-induced disorders         Nutrition/Diet History         Narrative   Upon arrival, pt was sitting in chair working with PT. Pt has recorded reduced intakes (0-25%) and is a poor historian d/t hx dementia. Pt's daughter came in and said that he has not been eating. She said that she was able to feed him the other day, but otherwise pt doesn't eat. Spoke to daughter about ONS. She was agreeable. Ordering Boost + TID and magic cup for dinner.     Daughter was concerned about pt eating/swallowing. Speech therapy saw pt on 3/28 and cleared for regular diet.     Reduced intake may be related to pts mental state. Will monitor pts intakes and follow for needs.      Anthropometrics        Current Height, Weight Height: 170.2 cm (67\")  Weight: 67.8 kg (149 lb 7.6 oz)   Current BMI Body mass index is 23.41 kg/m².   BMI Classification Normal range   % %   Adjusted Body Weight (ABW)    Weight Hx  Wt Readings from Last 30 Encounters:   03/26/24 1440 67.8 kg (149 lb 7.6 oz)   07/31/23 1551 67.1 kg (148 lb)   07/27/22 1623 67.6 kg (149 lb)   02/23/22 1123 69.4 kg (153 lb)   07/22/21 1653 70.8 kg (156 lb)   03/30/21 0000 73.5 kg (162 lb)   07/06/20 0000 72.6 kg (160 lb)   03/25/19 0000 75.7 kg (167 lb)   12/19/18 0000 76.2 kg (168 lb)   09/19/18 0000 74.8 kg (165 lb)          Wt Change Observation " "Consistent      Estimated/Assessed Needs  Estimated Needs based on: Current Body Weight       Energy Requirements 25-30 kcal/kg   EST Needs (kcal/day) 1695 - 2034       Protein Requirements 1.0-1.2 g/kg   EST Daily Needs (g/day) 67.8 - 81.4       Fluid Requirements 1 ml/kcal    Estimated Needs (mL/day) 1695 - 2034     Labs/Medications         Pertinent Labs Reviewed.   Results from last 7 days   Lab Units 04/01/24 0222 03/31/24 0230 03/30/24 0213 03/28/24 0217 03/26/24  1553   SODIUM mmol/L 142 140 138   < > 138   POTASSIUM mmol/L 3.5 3.5 3.6   < > 4.4   CHLORIDE mmol/L 95* 92* 94*   < > 97*   CO2 mmol/L 38.3* 36.0* 37.5*   < > 32.1*   BUN mg/dL 45* 36* 32*   < > 21   CREATININE mg/dL 1.45* 1.33* 1.54*   < > 1.23   CALCIUM mg/dL 9.1 9.2 8.7   < > 9.4   BILIRUBIN mg/dL  --   --   --   --  0.5   ALK PHOS U/L  --   --   --   --  85   ALT (SGPT) U/L  --   --   --   --  11   AST (SGOT) U/L  --   --   --   --  17   GLUCOSE mg/dL 132* 158* 132*   < > 122*    < > = values in this interval not displayed.     Results from last 7 days   Lab Units 04/01/24 0222 03/31/24 0230 03/30/24 0213   MAGNESIUM mg/dL  --  1.9  --    PHOSPHORUS mg/dL 3.4 3.3  3.3 4.2   HEMOGLOBIN g/dL  --   --  10.1*   HEMATOCRIT %  --   --  33.4*     No results found for: \"COVID19\"  No results found for: \"HGBA1C\"      Pertinent Medications Reviewed.     Malnutrition Severity Assessment              Nutrition Diagnosis         Nutrition Dx Problem 1 Inadequate energy Intake related to inadequate oral Intake as evidenced by patient report., family report., per nursing documentation., and report of minimal PO intake.     Nutrition Intervention           Current Nutrition Orders & Evaluation of Intake       Current PO Diet Diet: Regular/House; Fluid Consistency: Thin (IDDSI 0)   Supplement Orders Placed This Encounter      Snack: Boost shakes 3 times daily AC      Dietary Nutrition Supplements Magic Cup; vanilla           Nutrition " Intervention/Prescription        Boost + TID   711 kcals, 42g pro    Magic cup 1/day  290 kcals, 9g pro          Medical Nutrition Therapy/Nutrition Education          Learner     Readiness Patient and Family  Acceptance     Method     Response Explanation  Verbalizes understanding     Monitor/Evaluation        Monitor Per protocol, I&O, PO intake, Supplement intake, Weight, POC/GOC     Nutrition Discharge Plan         Recommend to continue oral nutrition supplements on discharge.      Electronically signed by:  Tammie Joyner  04/01/24 15:44 EDT

## 2024-04-01 NOTE — THERAPY TREATMENT NOTE
Acute Care - Physical Therapy Treatment Note   [facility]     Patient Name: Clark Layton  : 1940  MRN: [id number]  Today's Date: 2024      Visit Dx:     ICD-10-CM ICD-9-CM   1. Left displaced femoral neck fracture  S72.002A 820.8   2. Fall from chair, initial encounter  W07.XXXA E884.2   3. Dementia without behavioral disturbance, psychotic disturbance, mood disturbance, or anxiety, unspecified dementia severity, unspecified dementia type  F03.90 294.20   4. Impaired mobility and ADLs  Z74.09 V49.89    Z78.9    5. Difficulty in walking  R26.2 719.7   6. Dysphagia, unspecified type  R13.10 787.20     Patient Active Problem List   Diagnosis    Nicotine dependence, cigarettes, with other nicotine-induced disorders    High cholesterol    Gout    Essential hypertension    Medication management    Benign prostatic hyperplasia    Left displaced femoral neck fracture    Hip fracture    History of dementia     Past Medical History:   Diagnosis Date    Essential hypertension     Gout     High cholesterol 2018    Medication management 2018    Nicotine dependence, cigarettes, with other nicotine-induced disorders 2018     Past Surgical History:   Procedure Laterality Date    HIP BIPOLAR REPLACEMENT Left 3/27/2024    Procedure: HIP BIPOLAR ANTERIOR;  Surgeon: Bethany Carlson MD;  Location: [facility] MAIN OR;  Service: Orthopedics;  Laterality: Left;     PT Assessment (Last 12 Hours)       PT Evaluation and Treatment       Row Name 24 1425          Physical Therapy Time and Intention    Subjective Information complains of;pain  -VK     Document Type therapy note (daily note)  -VK     Mode of Treatment individual therapy;physical therapy  -VK     Patient Effort adequate  -VK       Row Name 24 1425          General Information    Patient Profile Reviewed yes  -VK     Existing Precautions/Restrictions fall;weight bearing  -VK       Row Name 24 1425          Pain    Pain Location -  Side/Orientation Left  -VK     Pain Location generalized  -VK     Pain Location - hip  -VK     Pain Intervention(s) Repositioned;Ambulation/increased activity;Rest  -VK       Row Name 04/01/24 1425          Cognition    Affect/Mental Status (Cognition) confused;confabulatory  -VK     Orientation Status (Cognition) oriented to;person  -VK     Personal Safety Interventions fall prevention program maintained;gait belt;nonskid shoes/slippers when out of bed  -VK       Row Name 04/01/24 1425          Mobility    Extremity Weight-bearing Status left lower extremity  -VK     Left Lower Extremity (Weight-bearing Status) weight-bearing as tolerated (WBAT)  -VK       Row Name 04/01/24 1425          Bed Mobility    Supine-Sit [place] (Bed Mobility) verbal cues;maximum assist (25% patient effort);1 person assist  -VK     Bed Mobility, Safety Issues cognitive deficits limit understanding;decreased use of arms for pushing/pulling;decreased use of legs for bridging/pushing  -VK     Assistive Device (Bed Mobility) bed rails;draw sheet;head of bed elevated  -VK       Row Name 04/01/24 1425          Transfers    Transfers stand pivot/stand step transfer  -       Row Name 04/01/24 1425          Bed-Chair Transfer    Bed-Chair [place] (Transfers) maximum assist (25% patient effort);verbal cues;2 person assist  -VK     Assistive Device (Bed-Chair Transfers) walker, front-wheeled  -       Row Name 04/01/24 1425          Sit-Stand Transfer    Sit-Stand [place] (Transfers) maximum assist (25% patient effort);verbal cues;2 person assist  -VK     Assistive Device (Sit-Stand Transfers) walker, front-wheeled  -       Row Name 04/01/24 1425          Stand-Sit Transfer    Stand-Sit [place] (Transfers) maximum assist (25% patient effort);verbal cues;2 person assist  -VK     Assistive Device (Stand-Sit Transfers) walker, front-wheeled  -VK       Row Name 04/01/24 1425          Stand Pivot/Stand Step Transfer    Stand  Pivot/Stand Step [place] (Transfers) maximum assist (25% patient effort);2 person assist;verbal cues  -     Assistive Device (Stand Pivot Stand Step Transfer) walker, front-wheeled  -       Row Name 04/01/24 1425          Gait/Stairs (Locomotion)    Patient was able to Ambulate yes  -     Reason Patient was unable to Ambulate Cognitive Deficit/Severe Dementia;Excessive Weakness  -       Row Name 04/01/24 1425          Safety Issues, Functional Mobility    Impairments Affecting Function (Mobility) balance;cognition;endurance/activity tolerance;pain;shortness of breath  -       Row Name 04/01/24 1425          Balance    Sit to Stand Dynamic Balance maximum assist;2-person assist;verbal cues  -VK     Dynamic Standing Balance maximum assist;2-person assist;verbal cues  -VK     Position/Device Used, Standing Balance walker, front-wheeled  -       Row Name 04/01/24 1425          Hip (Therapeutic Exercise)    Hip (Therapeutic Exercise) strengthening exercise  -     Hip Strengthening (Therapeutic Exercise) bilateral;heel slides;20 repititions  -       Row Name 04/01/24 1425          Ankle (Therapeutic Exercise)    Ankle AAROM (Therapeutic Exercise) bilateral;dorsiflexion;plantarflexion;10 repetitions  -       Row Name             Wound 03/27/24 1400 Left lateral thigh Incision    Wound - Properties Group Placement Date: 03/27/24  -AR Placement Time: 1400  -AR Side: Left  -AR Orientation: lateral  -AR Location: thigh  -AR Primary Wound Type: Incision  -AR    Retired Wound - Properties Group Placement Date: 03/27/24  -AR Placement Time: 1400  -AR Side: Left  -AR Orientation: lateral  -AR Location: thigh  -AR Primary Wound Type: Incision  -AR    Retired Wound - Properties Group Date first assessed: 03/27/24  -AR Time first assessed: 1400  -AR Side: Left  -AR Location: thigh  -AR Primary Wound Type: Incision  -AR      Row Name             Wound 03/31/24 0935 Left midline gluteal Pressure Injury    Wound -  Properties Group Placement Date: 03/31/24  - Placement Time: 0935 -MH Present on Original Admission: N  -MH Side: Left  -MH Orientation: midline  -MH Location: gluteal  -MH Primary Wound Type: Pressure inj  -MH    Retired Wound - Properties Group Placement Date: 03/31/24  - Placement Time: 0935 -MH Present on Original Admission: N  -MH Side: Left  -MH Orientation: midline  -MH Location: gluteal  -MH Primary Wound Type: Pressure inj  -MH    Retired Wound - Properties Group Date first assessed: 03/31/24  - Time first assessed: 0935 -MH Present on Original Admission: N  -MH Side: Left  -MH Location: gluteal  -MH Primary Wound Type: Pressure inj  -MH      Row Name 04/01/24 1425          Positioning and Restraints    Pre-Treatment Position in bed  -VK     Post Treatment Position chair  -VK     In Chair reclined;call light within reach;encouraged to call for assist;exit alarm on;with family/caregiver;notified nsg  -VK       Row Name 04/01/24 1425          Progress Summary (PT)    Progress Toward Functional Goals (PT) progress toward functional goals is fair  -VK     Daily Progress Summary (PT) Pt agreeable to treatment. Worked on transfers and attempted ambulation but pt continues to be limited by weakness, pain, and cognition. Pt continues to benefit from skilled PT to address stated deficits.  -VK               User Key  (r) = Recorded By, (t) = Taken By, (c) = Cosigned By      Initials Name Provider Type    JARETT Green April, RN CSA Registered Nurse    Nancy Ayala RN Registered Nurse    Saira Lopez PTA Physical Therapist Assistant                      PT Recommendation and Plan     Progress Summary (PT)  Progress Toward Functional Goals (PT): progress toward functional goals is fair  Daily Progress Summary (PT): Pt agreeable to treatment. Worked on transfers and attempted ambulation but pt continues to be limited by weakness, pain, and cognition. Pt continues to benefit from skilled PT to address  stated deficits.   Outcome Measures       Row Name 04/01/24 1600 03/31/24 1500 03/30/24 1300       How much help from another person do you currently need...    Turning from your back to your side while in flat bed without using bedrails? 2  -VK 2  -CS 2  -VK    Moving from lying on back to sitting on the side of a flat bed without bedrails? 2  -VK 2  -CS 2  -VK    Moving to and from a bed to a chair (including a wheelchair)? 2  -VK 2  -CS 2  -VK    Standing up from a chair using your arms (e.g., wheelchair, bedside chair)? 2  -VK 2  -CS 2  -VK    Climbing 3-5 steps with a railing? 1  -VK 1  -CS 1  -VK    To walk in hospital room? 1  -VK 1  -CS 2  -VK    AM-PAC 6 Clicks Score (PT) 10  -VK 10  -CS 11  -VK    Highest Level of Mobility Goal 4 --> Transfer to chair/commode  -VK 4 --> Transfer to chair/commode  -CS 4 --> Transfer to chair/commode  -VK       Functional Assessment    Outcome Measure Options -- AM-PAC 6 Clicks Basic Mobility (PT)  -CS --              User Key  (r) = Recorded By, (t) = Taken By, (c) = Cosigned By      Initials Name Provider Type    Woody Fields PTA Physical Therapist Assistant    Saira Lopez PTA Physical Therapist Assistant                     Time Calculation:    PT Charges       Row Name 04/01/24 1633             Time Calculation    PT Received On 04/01/24  -VK         Timed Charges    39440 - PT Therapeutic Exercise Minutes 13  -VK      12455 - Gait Training Minutes  5  -VK      13006 - PT Therapeutic Activity Minutes 30  -VK         Total Minutes    Timed Charges Total Minutes 48  -VK       Total Minutes 48  -VK                User Key  (r) = Recorded By, (t) = Taken By, (c) = Cosigned By      Initials Name Provider Type    Saira Lopez PTA Physical Therapist Assistant                  Therapy Charges for Today       Code Description Service Date Service Provider Modifiers Qty    97273982157 HC PT THER PROC EA 15 MIN 4/1/2024 Saira Fletcher PTA GP 1    [id number]  HC PT THERAPEUTIC ACT EA 15 MIN 4/1/2024 Saira Fletcher, CARLYN GP 2            PT G-Codes  Outcome Measure Options: AM-PAC 6 Clicks Basic Mobility (PT)  AM-PAC 6 Clicks Score (PT): 10  AM-PAC 6 Clicks Score (OT): 12    Saira Fletcher PTA  4/1/2024

## 2024-04-01 NOTE — SIGNIFICANT NOTE
Wound Eval / Progress Noted    [facility]     Patient Name: Clark Layton  : 1940  MRN: [id number]  Today's Date: 2024                 Admit Date: 3/26/2024    Visit Dx:    ICD-10-CM ICD-9-CM   1. Left displaced femoral neck fracture  S72.002A 820.8   2. Fall from chair, initial encounter  W07.XXXA E884.2   3. Dementia without behavioral disturbance, psychotic disturbance, mood disturbance, or anxiety, unspecified dementia severity, unspecified dementia type  F03.90 294.20   4. Impaired mobility and ADLs  Z74.09 V49.89    Z78.9    5. Difficulty in walking  R26.2 719.7   6. Dysphagia, unspecified type  R13.10 787.20         Hip fracture    Nicotine dependence, cigarettes, with other nicotine-induced disorders    Gout    Essential hypertension    History of dementia        Past Medical History:   Diagnosis Date    Essential hypertension     Gout     High cholesterol 2018    Medication management 2018    Nicotine dependence, cigarettes, with other nicotine-induced disorders 2018        Past Surgical History:   Procedure Laterality Date    HIP BIPOLAR REPLACEMENT Left 3/27/2024    Procedure: HIP BIPOLAR ANTERIOR;  Surgeon: Bethany Carlson MD;  Location: [facility] OR;  Service: Orthopedics;  Laterality: Left;         Physical Assessment:  Wound 24 0935 Left midline gluteal Pressure Injury (Active)   Wound Image   24 1040   Pressure Injury Stage DTPI 24 1040   Dressing Appearance open to air 24 1040   Closure None 24 1040   Base purple;dry;non-blanchable 24 1040   Periwound dry;intact;redness;blanchable 24 1040   Periwound Temperature warm 24 1040   Periwound Skin Turgor soft 24 1040   Edges rolled/closed 24 1040   Drainage Amount none 24 1040   Care, Wound cleansed with;sterile normal saline 24 1040   Dressing Care dressing applied;petroleum-based;non-adherent;gauze;silicone;border dressing 24 1040   Periwound  Care absorptive dressing applied 04/01/24 1040      Wound Check / Follow-up: Patient seen today for wound consult. Patient is awake and alert at time of visit. He is agreeable to assessment. Patient's wife is present at bedside.    Deep tissue injury noted to left gluteal aspect with intact, purple, non-blanchable tissue present. Periwound tissue with blanchable redness. Cleansed with normal saline and gauze. Recommending daily dressing changes with non-adherent petroleum based gauze and silicone border dressing.     Bilateral heels with blanchable redness. Recommending skin protection with application of barrier cream.     Impression: Deep tissue injury to left gluteal aspect. Blanchable redness to bilateral heels.      Short term goals: Regain skin integrity, skin protection, moisture prevention, pressure reduction, daily dressing changes, skin care.      Bruna Esteban RN    4/1/2024    12:52 EDT

## 2024-04-01 NOTE — PROGRESS NOTES
[facility]   Hospitalist Progress Note  Date: 2024  Patient Name: Clark Layton  : 1940  MRN: [id number]  Date of admission: 3/26/2024      Subjective   Subjective     Chief Complaint: Fall at home and left hip pain    Summary:   Clark Layton is a 83 y.o. male with past medical history of hypertension, Alzheimer's hyperlipidemia, BPH, nicotine dependence, and GERD presented to the ED after a fall resulting in left hip injury.  Patient has advanced dementia so history was given by wife at bedside.  As per wife he was sitting on a stool on the kitchen island when he fell asleep and fell to the floor.  Patient with severe pain afterwards and was then transferred to the ED for further evaluation.  In the ED patient was hypoxic on arrival with remaining vitals being within normal limits.  X-ray of the hip showed a left femoral neck fracture with CT confirming nondisplaced left femoral neck fracture.  Orthopedic surgery was consulted and performed successful hip anterior arthroplasty.  Postoperative course complicated by hypoxemia, BÁRBARA.  Pulmonology and nephrology were consulted.  He was diuresed with improvement.  He is having some difficulty working with therapy as appetite has been poor.  However, he is improving and plan is to discharge to rehab when medically stable.    Interval Followup:   No acute events overnight.  He actually slept better last night.  Has no complaints of pain this morning.    Objective   Objective     Vitals:   Temp:  [97.7 °F (36.5 °C)-99.3 °F (37.4 °C)] 98.4 °F (36.9 °C)  Heart Rate:  [] 89  Resp:  [16-18] 16  BP: (108-176)/(46-77) 119/46  Flow (L/min):  [2] 2  Physical Exam    Constitutional: resting comfortably in bed, awake and alert, NAD   HENT: NCAT, normal hearing    Respiratory: Essentially clear, nasal cannula in place, no increased WOB   Cardiovascular: RRR, no mrg    Gastrointestinal: Soft, nontender nondistended    Result Review    I have personally reviewed  the results for the past 24 hours and agree with these findings:  [x]  Laboratory personally reviewed BMP, magnesium, phosphorus  CBC          3/28/2024    02:17 3/29/2024    03:37 3/29/2024    03:50 3/30/2024    02:13   CBC   WBC 13.67  8.83   9.38    RBC 3.48  3.10   3.31    Hemoglobin 10.9  10.0   10.1    Hematocrit 35.1  30.9  31.2  33.4    .9  99.7   100.9    MCH 31.3  32.3   30.5    MCHC 31.1  32.4   30.2    RDW 14.6  14.4   13.9    Platelets 161  150   160      CMP          3/30/2024    02:13 3/31/2024    02:30 4/1/2024    02:22   CMP   Glucose 132  158  132    BUN 32  36  45    Creatinine 1.54  1.33  1.45    EGFR 45.0  53.7  48.4    Sodium 138  140  142    Potassium 3.6  3.5  3.5    Chloride 94  92  95    Calcium 8.7  9.2  9.1    Albumin 3.2  3.3  3.2    BUN/Creatinine Ratio 20.8  27.1  31.0    Anion Gap 6.5  12.0  8.7      []  Microbiology  [x]  Radiology  [x]  EKG/Telemetry   []  Cardiology/Vascular   []  Pathology  [x]  Old records  [x]  Other: Medications    Assessment & Plan   Assessment / Plan   Fall at home from sitting position with left hip pain.  Left displaced femoral neck fracture.  S/p left hip bipolar anterior arthroplasty on March 27.  Acute metabolic encephalopathy.  Hypoxia.  No home oxygen use.  Clinically improved down to 2 L/min on this morning.    Acute volume overload  Possibly undiagnosed COPD  Tobacco use disorder.  Hypertension  Gout  Hyperlipidemia.  Alzheimer's dementia.  BPH  Hematuria due to Crowder catheter.  Leukocytosis.  Likely reactive.  Appears resolved.  Acute kidney injury baseline creatinine around 1.3.  Creatinine improving  Elevated D dimer in the postop patient.  Anemia.  Postop and from IV hydration.  Today she is stable.     Continue to monitor in the hospital for workup and management of the above  Discussed with nephrology, decrease Lasix to 40 mg p.o. twice daily  Continue with Pulmicort and Brovana twice daily, DuoNebs as needed  Oxygenation improving,  continue to wean oxygen as tolerated keep sats greater than 90%  Will need walk test prior to discharge  Oral and IV narcotics for pain control on as-needed basis.  Completed course of antibiotics for presumed pneumonia  As needed Haldol for agitation  Continue home Flomax and allopurinol  Had tachycardia overnight but only showed sinus tachycardia.  Trying to minimize lines and tubes so will DC telemetry this morning.  Delirium precautions  Palliative care has been consulted but as he is improving daily, plan is currently to discharge to rehab when able  Continue scheduled bowel regimen.  PT/OT following-recommend inpatient rehab facility at discharge  Trend renal function and electrolytes with a.m. BMP, magnesium   Trend Hgb and WBC with a.m. CBC    Discussed case with: Bedside RN, nephrology, orthopedic surgery    DVT prophylaxis:  Medical and mechanical DVT prophylaxis orders are present.    CODE STATUS:   Medical Intervention Limits: NO intubation (DNI); NO cardioversion  Code Status (Patient has no pulse and is not breathing): No CPR (Do Not Attempt to Resuscitate)  Medical Interventions (Patient has pulse or is breathing): Limited Support

## 2024-04-02 ENCOUNTER — APPOINTMENT (OUTPATIENT)
Facility: HOSPITAL | Age: 84
End: 2024-04-02
Payer: MEDICARE

## 2024-04-02 PROBLEM — D50.9 IRON DEFICIENCY ANEMIA: Status: ACTIVE | Noted: 2024-04-02

## 2024-04-02 PROBLEM — N17.9 ACUTE RENAL FAILURE: Status: ACTIVE | Noted: 2024-04-02

## 2024-04-02 PROBLEM — E87.3 METABOLIC ALKALOSIS: Status: ACTIVE | Noted: 2024-04-02

## 2024-04-02 LAB
ANION GAP SERPL CALCULATED.3IONS-SCNC: 10.6 MMOL/L (ref 5–15)
BASOPHILS # BLD AUTO: 0.07 10*3/MM3 (ref 0–0.2)
BASOPHILS NFR BLD AUTO: 0.4 % (ref 0–1.5)
BH CV LOWER VASCULAR LEFT COMMON FEMORAL AUGMENT: NORMAL
BH CV LOWER VASCULAR LEFT COMMON FEMORAL COMPETENT: NORMAL
BH CV LOWER VASCULAR LEFT COMMON FEMORAL COMPRESS: NORMAL
BH CV LOWER VASCULAR LEFT COMMON FEMORAL PHASIC: NORMAL
BH CV LOWER VASCULAR LEFT COMMON FEMORAL SPONT: NORMAL
BH CV LOWER VASCULAR LEFT DISTAL FEMORAL COMPRESS: NORMAL
BH CV LOWER VASCULAR LEFT GASTRONEMIUS COMPRESS: NORMAL
BH CV LOWER VASCULAR LEFT GREATER SAPH AK AUGMENT: NORMAL
BH CV LOWER VASCULAR LEFT GREATER SAPH AK COMPETENT: NORMAL
BH CV LOWER VASCULAR LEFT GREATER SAPH AK COMPRESS: NORMAL
BH CV LOWER VASCULAR LEFT GREATER SAPH AK PHASIC: NORMAL
BH CV LOWER VASCULAR LEFT GREATER SAPH AK SPONT: NORMAL
BH CV LOWER VASCULAR LEFT GREATER SAPH BK COMPRESS: NORMAL
BH CV LOWER VASCULAR LEFT LESSER SAPH COMPRESS: NORMAL
BH CV LOWER VASCULAR LEFT MID FEMORAL AUGMENT: NORMAL
BH CV LOWER VASCULAR LEFT MID FEMORAL COMPETENT: NORMAL
BH CV LOWER VASCULAR LEFT MID FEMORAL COMPRESS: NORMAL
BH CV LOWER VASCULAR LEFT MID FEMORAL PHASIC: NORMAL
BH CV LOWER VASCULAR LEFT MID FEMORAL SPONT: NORMAL
BH CV LOWER VASCULAR LEFT PERONEAL AUGMENT: NORMAL
BH CV LOWER VASCULAR LEFT PERONEAL COMPETENT: NORMAL
BH CV LOWER VASCULAR LEFT PERONEAL COMPRESS: NORMAL
BH CV LOWER VASCULAR LEFT PERONEAL PHASIC: NORMAL
BH CV LOWER VASCULAR LEFT PERONEAL SPONT: NORMAL
BH CV LOWER VASCULAR LEFT POPLITEAL AUGMENT: NORMAL
BH CV LOWER VASCULAR LEFT POPLITEAL COMPETENT: NORMAL
BH CV LOWER VASCULAR LEFT POPLITEAL COMPRESS: NORMAL
BH CV LOWER VASCULAR LEFT POPLITEAL PHASIC: NORMAL
BH CV LOWER VASCULAR LEFT POPLITEAL SPONT: NORMAL
BH CV LOWER VASCULAR LEFT POSTERIOR TIBIAL AUGMENT: NORMAL
BH CV LOWER VASCULAR LEFT POSTERIOR TIBIAL COMPETENT: NORMAL
BH CV LOWER VASCULAR LEFT POSTERIOR TIBIAL COMPRESS: NORMAL
BH CV LOWER VASCULAR LEFT POSTERIOR TIBIAL PHASIC: NORMAL
BH CV LOWER VASCULAR LEFT POSTERIOR TIBIAL SPONT: NORMAL
BH CV LOWER VASCULAR LEFT PROXIMAL FEMORAL COMPRESS: NORMAL
BH CV LOWER VASCULAR RIGHT COMMON FEMORAL AUGMENT: NORMAL
BH CV LOWER VASCULAR RIGHT COMMON FEMORAL COMPETENT: NORMAL
BH CV LOWER VASCULAR RIGHT COMMON FEMORAL COMPRESS: NORMAL
BH CV LOWER VASCULAR RIGHT COMMON FEMORAL PHASIC: NORMAL
BH CV LOWER VASCULAR RIGHT COMMON FEMORAL SPONT: NORMAL
BH CV LOWER VASCULAR RIGHT DISTAL FEMORAL COMPRESS: NORMAL
BH CV LOWER VASCULAR RIGHT GASTRONEMIUS COMPRESS: NORMAL
BH CV LOWER VASCULAR RIGHT GREATER SAPH AK AUGMENT: NORMAL
BH CV LOWER VASCULAR RIGHT GREATER SAPH AK COMPETENT: NORMAL
BH CV LOWER VASCULAR RIGHT GREATER SAPH AK COMPRESS: NORMAL
BH CV LOWER VASCULAR RIGHT GREATER SAPH AK PHASIC: NORMAL
BH CV LOWER VASCULAR RIGHT GREATER SAPH AK SPONT: NORMAL
BH CV LOWER VASCULAR RIGHT GREATER SAPH BK COMPRESS: NORMAL
BH CV LOWER VASCULAR RIGHT LESSER SAPH COMPRESS: NORMAL
BH CV LOWER VASCULAR RIGHT MID FEMORAL AUGMENT: NORMAL
BH CV LOWER VASCULAR RIGHT MID FEMORAL COMPETENT: NORMAL
BH CV LOWER VASCULAR RIGHT MID FEMORAL COMPRESS: NORMAL
BH CV LOWER VASCULAR RIGHT MID FEMORAL PHASIC: NORMAL
BH CV LOWER VASCULAR RIGHT MID FEMORAL SPONT: NORMAL
BH CV LOWER VASCULAR RIGHT PERONEAL AUGMENT: NORMAL
BH CV LOWER VASCULAR RIGHT PERONEAL COMPETENT: NORMAL
BH CV LOWER VASCULAR RIGHT PERONEAL COMPRESS: NORMAL
BH CV LOWER VASCULAR RIGHT PERONEAL PHASIC: NORMAL
BH CV LOWER VASCULAR RIGHT PERONEAL SPONT: NORMAL
BH CV LOWER VASCULAR RIGHT POPLITEAL AUGMENT: NORMAL
BH CV LOWER VASCULAR RIGHT POPLITEAL COMPETENT: NORMAL
BH CV LOWER VASCULAR RIGHT POPLITEAL COMPRESS: NORMAL
BH CV LOWER VASCULAR RIGHT POPLITEAL PHASIC: NORMAL
BH CV LOWER VASCULAR RIGHT POPLITEAL SPONT: NORMAL
BH CV LOWER VASCULAR RIGHT POSTERIOR TIBIAL AUGMENT: NORMAL
BH CV LOWER VASCULAR RIGHT POSTERIOR TIBIAL COMPETENT: NORMAL
BH CV LOWER VASCULAR RIGHT POSTERIOR TIBIAL COMPRESS: NORMAL
BH CV LOWER VASCULAR RIGHT POSTERIOR TIBIAL PHASIC: NORMAL
BH CV LOWER VASCULAR RIGHT POSTERIOR TIBIAL SPONT: NORMAL
BH CV LOWER VASCULAR RIGHT PROXIMAL FEMORAL COMPRESS: NORMAL
BUN SERPL-MCNC: 47 MG/DL (ref 8–23)
BUN/CREAT SERPL: 34.8 (ref 7–25)
CALCIUM SPEC-SCNC: 9.1 MG/DL (ref 8.6–10.5)
CHLORIDE SERPL-SCNC: 94 MMOL/L (ref 98–107)
CO2 SERPL-SCNC: 37.4 MMOL/L (ref 22–29)
CREAT SERPL-MCNC: 1.35 MG/DL (ref 0.76–1.27)
DEPRECATED RDW RBC AUTO: 51.4 FL (ref 37–54)
EGFRCR SERPLBLD CKD-EPI 2021: 52.7 ML/MIN/1.73
EOSINOPHIL # BLD AUTO: 0.03 10*3/MM3 (ref 0–0.4)
EOSINOPHIL NFR BLD AUTO: 0.2 % (ref 0.3–6.2)
ERYTHROCYTE [DISTWIDTH] IN BLOOD BY AUTOMATED COUNT: 14.2 % (ref 12.3–15.4)
GLUCOSE SERPL-MCNC: 145 MG/DL (ref 65–99)
HCT VFR BLD AUTO: 33.3 % (ref 37.5–51)
HGB BLD-MCNC: 10.6 G/DL (ref 13–17.7)
IMM GRANULOCYTES # BLD AUTO: 0.13 10*3/MM3 (ref 0–0.05)
IMM GRANULOCYTES NFR BLD AUTO: 0.7 % (ref 0–0.5)
IRON 24H UR-MRATE: 12 MCG/DL (ref 59–158)
IRON SATN MFR SERPL: 6 % (ref 20–50)
LYMPHOCYTES # BLD AUTO: 1.28 10*3/MM3 (ref 0.7–3.1)
LYMPHOCYTES NFR BLD AUTO: 7.3 % (ref 19.6–45.3)
MAGNESIUM SERPL-MCNC: 2.1 MG/DL (ref 1.6–2.4)
MCH RBC QN AUTO: 31.5 PG (ref 26.6–33)
MCHC RBC AUTO-ENTMCNC: 31.8 G/DL (ref 31.5–35.7)
MCV RBC AUTO: 99.1 FL (ref 79–97)
MONOCYTES # BLD AUTO: 1.26 10*3/MM3 (ref 0.1–0.9)
MONOCYTES NFR BLD AUTO: 7.2 % (ref 5–12)
NEUTROPHILS NFR BLD AUTO: 14.65 10*3/MM3 (ref 1.7–7)
NEUTROPHILS NFR BLD AUTO: 84.2 % (ref 42.7–76)
NRBC BLD AUTO-RTO: 0 /100 WBC (ref 0–0.2)
PHOSPHATE SERPL-MCNC: 3.6 MG/DL (ref 2.5–4.5)
PLATELET # BLD AUTO: 239 10*3/MM3 (ref 140–450)
PMV BLD AUTO: 11.4 FL (ref 6–12)
POTASSIUM SERPL-SCNC: 3.1 MMOL/L (ref 3.5–5.2)
RBC # BLD AUTO: 3.36 10*6/MM3 (ref 4.14–5.8)
SODIUM SERPL-SCNC: 142 MMOL/L (ref 136–145)
TIBC SERPL-MCNC: 198 MCG/DL (ref 298–536)
TRANSFERRIN SERPL-MCNC: 133 MG/DL (ref 200–360)
WBC NRBC COR # BLD AUTO: 17.42 10*3/MM3 (ref 3.4–10.8)

## 2024-04-02 PROCEDURE — 94760 N-INVAS EAR/PLS OXIMETRY 1: CPT

## 2024-04-02 PROCEDURE — 94799 UNLISTED PULMONARY SVC/PX: CPT

## 2024-04-02 PROCEDURE — 83540 ASSAY OF IRON: CPT | Performed by: INTERNAL MEDICINE

## 2024-04-02 PROCEDURE — 94664 DEMO&/EVAL PT USE INHALER: CPT

## 2024-04-02 PROCEDURE — 93970 EXTREMITY STUDY: CPT | Performed by: SURGERY

## 2024-04-02 PROCEDURE — 25010000002 ENOXAPARIN PER 10 MG: Performed by: INTERNAL MEDICINE

## 2024-04-02 PROCEDURE — 97110 THERAPEUTIC EXERCISES: CPT

## 2024-04-02 PROCEDURE — 97530 THERAPEUTIC ACTIVITIES: CPT

## 2024-04-02 PROCEDURE — 83735 ASSAY OF MAGNESIUM: CPT | Performed by: INTERNAL MEDICINE

## 2024-04-02 PROCEDURE — 25010000002 NA FERRIC GLUC CPLX PER 12.5 MG: Performed by: INTERNAL MEDICINE

## 2024-04-02 PROCEDURE — 93970 EXTREMITY STUDY: CPT

## 2024-04-02 PROCEDURE — 80048 BASIC METABOLIC PNL TOTAL CA: CPT | Performed by: INTERNAL MEDICINE

## 2024-04-02 PROCEDURE — 84466 ASSAY OF TRANSFERRIN: CPT | Performed by: INTERNAL MEDICINE

## 2024-04-02 PROCEDURE — 99232 SBSQ HOSP IP/OBS MODERATE 35: CPT | Performed by: INTERNAL MEDICINE

## 2024-04-02 PROCEDURE — 84100 ASSAY OF PHOSPHORUS: CPT | Performed by: INTERNAL MEDICINE

## 2024-04-02 PROCEDURE — 25810000003 SODIUM CHLORIDE 0.9 % SOLUTION: Performed by: INTERNAL MEDICINE

## 2024-04-02 PROCEDURE — 85025 COMPLETE CBC W/AUTO DIFF WBC: CPT | Performed by: INTERNAL MEDICINE

## 2024-04-02 RX ORDER — FUROSEMIDE 40 MG/1
40 TABLET ORAL DAILY
Status: DISCONTINUED | OUTPATIENT
Start: 2024-04-02 | End: 2024-04-02

## 2024-04-02 RX ORDER — POTASSIUM CHLORIDE 750 MG/1
40 CAPSULE, EXTENDED RELEASE ORAL 2 TIMES DAILY WITH MEALS
Status: COMPLETED | OUTPATIENT
Start: 2024-04-02 | End: 2024-04-02

## 2024-04-02 RX ADMIN — Medication 10 ML: at 09:45

## 2024-04-02 RX ADMIN — SODIUM CHLORIDE 250 MG: 9 INJECTION, SOLUTION INTRAVENOUS at 09:37

## 2024-04-02 RX ADMIN — ENOXAPARIN SODIUM 30 MG: 100 INJECTION SUBCUTANEOUS at 09:37

## 2024-04-02 RX ADMIN — TAMSULOSIN HYDROCHLORIDE 0.4 MG: 0.4 CAPSULE ORAL at 09:37

## 2024-04-02 RX ADMIN — ACETAMINOPHEN 650 MG: 650 SUPPOSITORY RECTAL at 22:25

## 2024-04-02 RX ADMIN — HYDROCODONE BITARTRATE AND ACETAMINOPHEN 1 TABLET: 7.5; 325 TABLET ORAL at 20:03

## 2024-04-02 RX ADMIN — POTASSIUM CHLORIDE 40 MEQ: 750 CAPSULE, EXTENDED RELEASE ORAL at 17:50

## 2024-04-02 RX ADMIN — POTASSIUM CHLORIDE 40 MEQ: 750 CAPSULE, EXTENDED RELEASE ORAL at 09:36

## 2024-04-02 RX ADMIN — BUDESONIDE 0.5 MG: 0.5 INHALANT ORAL at 19:31

## 2024-04-02 RX ADMIN — FAMOTIDINE 20 MG: 20 TABLET ORAL at 09:37

## 2024-04-02 RX ADMIN — ARFORMOTEROL TARTRATE 15 MCG: 15 SOLUTION RESPIRATORY (INHALATION) at 19:30

## 2024-04-02 RX ADMIN — ALLOPURINOL 300 MG: 300 TABLET ORAL at 09:36

## 2024-04-02 RX ADMIN — ARFORMOTEROL TARTRATE 15 MCG: 15 SOLUTION RESPIRATORY (INHALATION) at 11:07

## 2024-04-02 RX ADMIN — NICOTINE 1 PATCH: 21 PATCH, EXTENDED RELEASE TRANSDERMAL at 09:44

## 2024-04-02 RX ADMIN — BUDESONIDE 0.5 MG: 0.5 INHALANT ORAL at 11:06

## 2024-04-02 RX ADMIN — HYDROCODONE BITARTRATE AND ACETAMINOPHEN 1 TABLET: 7.5; 325 TABLET ORAL at 04:24

## 2024-04-02 RX ADMIN — Medication 10 ML: at 20:05

## 2024-04-02 NOTE — PLAN OF CARE
Goal Outcome Evaluation:  Plan of Care Reviewed With: patient        Progress: no change  Outcome Evaluation: pt. has been turned and skin care done throughout the shift and tolerated well.  pt. heart rate has been elevated at times this shift and WBC'S  are elevated at 17.42 up from 9.38 on 3/30.  Dr. Felix notified pt. has some restlessness and agitation this shift, pain medication given x 1 with pt. resting quietly and heart rate now 88 apically.

## 2024-04-02 NOTE — CONSULTS
Purpose of the visit was to evaluate for: goals of care/advanced care planning and support for patient/family. Spoke with  PCA  and discussed palliative care.      Assessment:The patient is on ortho, on 2 liters nasal canula, HOB elevated, Iron infusing, currently resting with eyes closed, RR even and unlabored. No S/S of pain, nausea or anxiety at this time.    Recommendations/Plan:  Palliative care will reach out to the family .    Other Comments: Palliative Care attempted a visit and at the time of visit, no family/friends at the bedside. PCA reports the spouse had appointments today but would be back later in the day. Call placed to the spouse with message left.    -Guillermina VANG, RN, PN   Palliative Care    4/2/2024 12:07 EDT

## 2024-04-02 NOTE — PROGRESS NOTES
[facility]     Progress Note    Patient Name: Clark Layton  : 1940  MRN: [id number]  Primary Care Physician:  Honorio Field MD  Date of admission: 3/26/2024    Subjective   Patient fully awake alert responsive this morning.  According to his family member patient did not have a good appetite but he is able to finish Ensure  Renal function is close to baseline blood pressure is relatively soft  Patient is also hypokalemic  Review of Systems  Constitutional:        Weakness tiredness fatigue  Eyes:                       No blurry vision, eye discharge, eye irritation, eye pain  HEENT:                   No acute hair loss, earache and discharge, nasal congestion or discharge, sore throat, postnasal drip  Respiratory:           No shortness of breath coughing sputum production wheezing hemoptysis pleuritic chest pain  Cardiovascular:     No chest pain, orthopnea, PND, dizziness, palpitation, lower extremity edema  Gastrointestinal:   No nausea vomiting diarrhea abdominal pain constipation  Genitourinary:       No urinary incontinence, hesitancy, frequency, urgency, dysuria  Hematologic:         No bruising, bleeding, pallor, lymphadenopathy  Endocrine:            No coldness, hot flashes, polyuria, abnormal hair growth  Musculoskeletal:    No body pains, aches, arthritic pains, muscle pain ,muscle wasting  Psychiatric:          No low or high mood, anxiety, hallucinations, delusions  Skin.                      No rash, ulcers, bruising, itching  Neurological:        No confusion, headache, focal weakness, numbness, dysphasia    Objective   Objective     Vitals:   Temp:  [97.5 °F (36.4 °C)-99.9 °F (37.7 °C)] 97.5 °F (36.4 °C)  Heart Rate:  [] 78  Resp:  [12-18] 16  BP: ()/(52-67) 131/56  Flow (L/min):  [2] 2  Physical Exam    Constitutional: Awake, alert responsive, conversant, no obvious distress              Psychiatric:  Appropriate affect, cooperative   Neurologic:  Awake alert  ,oriented x 3, strength symmetric in all extremities, Cranial Nerves grossly intact to confrontation, speech clear   Eyes:   PERRLA, sclerae anicteric, no conjunctival injection   HEENT:  Moist mucous membranes, no nasal or eye discharge, no throat congestion   Neck:   Supple, no thyromegaly, no lymphadenopathy, trachea midline, no elevated JVD   Respiratory:  Clear to auscultation bilaterally, nonlabored respirations    Cardiovascular: RRR, no murmurs, rubs, or gallops, palpable pedal pulses bilaterally, No bilateral ankle edema   Gastrointestinal: Positive bowel sounds, soft, nontender, nondistended, no organomegaly   Musculoskeletal:  No clubbing or cyanosis to extremities,muscle wasting, joint swelling, muscle weakness             Skin:                      No rashes, bruising, skin ulcers, petechiae or ecchymosis    Result Review    Result Review:  I have personally reviewed the results from the time of this admission to 4/2/2024 08:01 EDT and agree with these findings:  []  Laboratory  []  Microbiology  []  Radiology  []  EKG/Telemetry   []  Cardiology/Vascular   []  Pathology  []  Old records  []  Other:    Results from last 7 days   Lab Units 04/02/24  0215 03/30/24  0213 03/29/24  0337 03/28/24 0217 03/26/24  1553   WBC 10*3/mm3 17.42* 9.38 8.83 13.67* 14.30*   HEMOGLOBIN g/dL 10.6* 10.1* 10.0* 10.9* 14.2   PLATELETS 10*3/mm3 239 160 150 161 230     Results from last 7 days   Lab Units 04/02/24  0215 04/01/24  0222 03/31/24  0230 03/30/24  0213 03/29/24  0337 03/28/24 0217 03/26/24  1553   SODIUM mmol/L 142 142 140 138 138 137 138   POTASSIUM mmol/L 3.1* 3.5 3.5 3.6 3.9 4.7 4.4   CHLORIDE mmol/L 94* 95* 92* 94* 97* 100 97*   CO2 mmol/L 37.4* 38.3* 36.0* 37.5* 34.4* 28.9 32.1*   BUN mg/dL 47* 45* 36* 32* 35* 35* 21   CREATININE mg/dL 1.35* 1.45* 1.33* 1.54* 1.54* 2.02* 1.23   GLUCOSE mg/dL 145* 132* 158* 132* 101* 115* 122*       Assessment & Plan   Assessment / Plan       Active Hospital  Problems:    Active Hospital Problems    Diagnosis  POA    **Hip fracture [S72.009A]  Yes    Acute renal failure [N17.9]  Unknown    Iron deficiency anemia [D50.9]  Unknown     Give IV iron      Metabolic alkalosis [E87.3]  Unknown     From diuretics      History of dementia [Z86.59]  Not Applicable    Gout [M10.9]  Yes    Essential hypertension [I10]  Yes    Nicotine dependence, cigarettes, with other nicotine-induced disorders [F17.218]  Yes       Plan:   Replace potassium  DC p.o. iron and will give IV iron  Hold Lasix as patient has developed metabolic alkalosis from contraction alkalosis from diuretics         Electronically signed by Milli Nelson MD, 04/02/24, 7:59 AM EDT.

## 2024-04-02 NOTE — PLAN OF CARE
Goal Outcome Evaluation:           Progress: no change  Outcome Evaluation: Pt sleeping between care today. Pt having no complaints/signs/symptoms of pain this shift. Pt tolerating po intake with no nausea/vomiting. VSS. Will continue to monitor.       Bhavya Mallory RN

## 2024-04-02 NOTE — PROGRESS NOTES
[facility]   Hospitalist Progress Note  Date: 2024  Patient Name: Clark Layton  : 1940  MRN: [id number]  Date of admission: 3/26/2024      Subjective   Subjective     Chief Complaint: Fall at home and left hip pain    Summary:   Clark Layton is a 83 y.o. male with past medical history of hypertension, Alzheimer's hyperlipidemia, BPH, nicotine dependence, and GERD presented to the ED after a fall resulting in left hip injury.  Patient has advanced dementia so history was given by wife at bedside.  As per wife he was sitting on a stool on the kitchen island when he fell asleep and fell to the floor.  Patient with severe pain afterwards and was then transferred to the ED for further evaluation.  In the ED patient was hypoxic on arrival with remaining vitals being within normal limits.  X-ray of the hip showed a left femoral neck fracture with CT confirming nondisplaced left femoral neck fracture.  Orthopedic surgery was consulted and performed successful hip anterior arthroplasty.  Postoperative course complicated by hypoxemia, BÁRBARA.  Pulmonology and nephrology were consulted.  He was diuresed with improvement.  He is having some difficulty working with therapy as appetite has been poor.  However, he is improving and plan is to discharge to rehab when medically stable.    Interval Followup:   No acute events over the last 24 hours, patient resting comfortably in bed    Objective   Objective     Vitals:   Temp:  [97.5 °F (36.4 °C)-99.9 °F (37.7 °C)] 97.5 °F (36.4 °C)  Heart Rate:  [] 130  Resp:  [12-18] 16  BP: ()/(52-67) 131/56  Flow (L/min):  [2] 2  Physical Exam   GEN: No acute distress  HEENT: Moist mucous membranes  LUNGS: Equal chest rise bilaterally  CARDIAC: Regular rate and rhythm  NEURO: Moving all 4 extremities spontaneously  SKIN: No obvious breakdown    Result Review    I have personally reviewed the results for the past 24 hours and agree with these findings:  [x]  Laboratory  personally reviewed BMP, magnesium, phosphorus  CBC          3/29/2024    03:37 3/29/2024    03:50 3/30/2024    02:13 4/2/2024    02:15   CBC   WBC 8.83   9.38  17.42    RBC 3.10   3.31  3.36    Hemoglobin 10.0   10.1  10.6    Hematocrit 30.9  31.2  33.4  33.3    MCV 99.7   100.9  99.1    MCH 32.3   30.5  31.5    MCHC 32.4   30.2  31.8    RDW 14.4   13.9  14.2    Platelets 150   160  239      CMP          3/31/2024    02:30 4/1/2024    02:22 4/2/2024    02:15   CMP   Glucose 158  132  145    BUN 36  45  47    Creatinine 1.33  1.45  1.35    EGFR 53.7  48.4  52.7    Sodium 140  142  142    Potassium 3.5  3.5  3.1    Chloride 92  95  94    Calcium 9.2  9.1  9.1    Albumin 3.3  3.2     BUN/Creatinine Ratio 27.1  31.0  34.8    Anion Gap 12.0  8.7  10.6      []  Microbiology  [x]  Radiology  [x]  EKG/Telemetry   []  Cardiology/Vascular   []  Pathology  [x]  Old records  [x]  Other: Medications    Assessment & Plan   Assessment / Plan   Fall at home from sitting position with left hip pain.  Left displaced femoral neck fracture.  S/p left hip bipolar anterior arthroplasty on March 27.  Acute metabolic encephalopathy.  Hypoxia.  No home oxygen use.  Clinically improved down to 2 L/min on this morning.    Acute volume overload  Possibly undiagnosed COPD  Tobacco use disorder.  Hypertension  Gout  Hyperlipidemia.  Alzheimer's dementia.  BPH  Hematuria due to Crowder catheter.  Leukocytosis.  Likely reactive.  Appears resolved.  Acute kidney injury baseline creatinine around 1.3.  Creatinine improving  Elevated D dimer in the postop patient.  Anemia.  Postop and from IV hydration.  Today she is stable.     Continue to monitor in the hospital for workup and management of the above  Holding Lasix secondary to contraction alkalosis  Continue with Pulmicort and Brovana twice daily, DuoNebs as needed  Oxygenation improving, continue to wean oxygen as tolerated keep sats greater than 90%  Will need walk test prior to discharge  Oral  and IV narcotics for pain control on as-needed basis.  Completed course of antibiotics for presumed pneumonia  As needed Haldol for agitation  Continue home Flomax and allopurinol  Continue delirium precautions  Palliative care has been consulted but as he is improving daily, plan is currently to discharge to rehab when able  Continue scheduled bowel regimen.  PT/OT following-recommend inpatient rehab facility at discharge  CBC, CMP reviewed  Repeat CBC, CMP, mag and Phos in a.m.    Reviewed patients labs and imaging, and discussed with patient and nurse at bedside.      DVT prophylaxis:  Medical and mechanical DVT prophylaxis orders are present.    CODE STATUS:   Medical Intervention Limits: NO intubation (DNI); NO cardioversion  Code Status (Patient has no pulse and is not breathing): No CPR (Do Not Attempt to Resuscitate)  Medical Interventions (Patient has pulse or is breathing): Limited Support      Electronically signed by Jamal Eaton MD, 04/02/24, 11:27 AM EDT.

## 2024-04-02 NOTE — THERAPY TREATMENT NOTE
Acute Care - Physical Therapy Treatment Note   [facility]     Patient Name: Clark Layton  : 1940  MRN: [id number]  Today's Date: 2024      Visit Dx:     ICD-10-CM ICD-9-CM   1. Left displaced femoral neck fracture  S72.002A 820.8   2. Fall from chair, initial encounter  W07.XXXA E884.2   3. Dementia without behavioral disturbance, psychotic disturbance, mood disturbance, or anxiety, unspecified dementia severity, unspecified dementia type  F03.90 294.20   4. Impaired mobility and ADLs  Z74.09 V49.89    Z78.9    5. Difficulty in walking  R26.2 719.7   6. Dysphagia, unspecified type  R13.10 787.20     Patient Active Problem List   Diagnosis    Nicotine dependence, cigarettes, with other nicotine-induced disorders    High cholesterol    Gout    Essential hypertension    Medication management    Benign prostatic hyperplasia    Left displaced femoral neck fracture    Hip fracture    History of dementia    Acute renal failure    Iron deficiency anemia    Metabolic alkalosis     Past Medical History:   Diagnosis Date    Essential hypertension     Gout     High cholesterol 2018    Medication management 2018    Nicotine dependence, cigarettes, with other nicotine-induced disorders 2018     Past Surgical History:   Procedure Laterality Date    HIP BIPOLAR REPLACEMENT Left 3/27/2024    Procedure: HIP BIPOLAR ANTERIOR;  Surgeon: Bethany Carlson MD;  Location: [facility];  Service: Orthopedics;  Laterality: Left;     PT Assessment (Last 12 Hours)       PT Evaluation and Treatment       Row Name 24 1242          Physical Therapy Time and Intention    Subjective Information no complaints  -RH     Document Type therapy note (daily note)  -     Mode of Treatment physical therapy;individual therapy  -     Patient Effort poor  -RH       Row Name 24 1242          Pain Scale: FACES Pre/Post-Treatment    Pain: FACES Scale, Pretreatment 0-->no hurt  -RH     Posttreatment Pain Rating 0-->no  hurt  -       Row Name 04/02/24 1242          Range of Motion (ROM)    Range of Motion --  Pt L hip AAROM at 50 degrees flex and 20 degrees abd.  -       Row Name 04/02/24 1242          Strength (Manual Muscle Testing)    Strength (Manual Muscle Testing) --  Pt L knee flex strength at 2-/5.  -       Row Name 04/02/24 1242          Mobility    Extremity Weight-bearing Status left lower extremity  -     Left Lower Extremity (Weight-bearing Status) weight-bearing as tolerated (WBAT)  -       Row Name 04/02/24 1242          Bed Mobility    Bed Mobility scooting/bridging;supine-sit;sit-supine  -     All Activities, Gilbert (Bed Mobility) maximum assist (25% patient effort)  -     Scooting/Bridging Gilbert (Bed Mobility) maximum assist (25% patient effort)  -     Supine-Sit Gilbert (Bed Mobility) maximum assist (25% patient effort)  -     Sit-Supine Gilbert (Bed Mobility) maximum assist (25% patient effort)  -     Bed Mobility, Safety Issues decreased use of legs for bridging/pushing;cognitive deficits limit understanding  -     Assistive Device (Bed Mobility) bed rails  -     Comment, (Bed Mobility) Pt maintains sitting x 5 minutes with CGA/min assist with BUE support.  -       Row Name 04/02/24 1242          Balance    Static Sitting Balance contact guard;minimal assist  -       Row Name             Wound 03/27/24 1400 Left lateral thigh Incision    Wound - Properties Group Placement Date: 03/27/24  -AR Placement Time: 1400  -AR Side: Left  -AR Orientation: lateral  -AR Location: thigh  -AR Primary Wound Type: Incision  -AR    Retired Wound - Properties Group Placement Date: 03/27/24  -AR Placement Time: 1400  -AR Side: Left  -AR Orientation: lateral  -AR Location: thigh  -AR Primary Wound Type: Incision  -AR    Retired Wound - Properties Group Date first assessed: 03/27/24  -AR Time first assessed: 1400  -AR Side: Left  -AR Location: thigh  -AR Primary Wound Type:  Incision  -AR      Row Name             Wound 03/31/24 0935 Left midline gluteal Pressure Injury    Wound - Properties Group Placement Date: 03/31/24  - Placement Time: 0935 -MH Present on Original Admission: N  -MH Side: Left  -MH Orientation: midline  -MH Location: gluteal  -MH Primary Wound Type: Pressure inj  -MH    Retired Wound - Properties Group Placement Date: 03/31/24  - Placement Time: 0935  -MH Present on Original Admission: N  -MH Side: Left  -MH Orientation: midline  -MH Location: gluteal  -MH Primary Wound Type: Pressure inj  -MH    Retired Wound - Properties Group Date first assessed: 03/31/24  - Time first assessed: 0935  -MH Present on Original Admission: N  -MH Side: Left  -MH Location: gluteal  -MH Primary Wound Type: Pressure inj  -MH      Row Name 04/02/24 1242          Vital Signs    O2 Delivery Intra Treatment --  Pt on 2L with nasal cannula.  -       Row Name 04/02/24 1242          Progress Summary (PT)    Progress Toward Functional Goals (PT) progress toward functional goals is gradual  -               User Key  (r) = Recorded By, (t) = Taken By, (c) = Cosigned By      Initials Name Provider Type    AR Peter, April, RN CSA Registered Nurse    RH Tony Lieberman PTA Physical Therapist Assistant    Nancy Ayala RN Registered Nurse                Bilateral Lower Extremity   Exercise  Reps  Sets    Short arc quads   10 2   Heel slides  10 2   Ankle pumps  10 2   Quad sets  10 2   Gluteal sets 10 2   Hip ab/adduction 10 2            PT Recommendation and Plan     Progress Summary (PT)  Progress Toward Functional Goals (PT): progress toward functional goals is gradual   Outcome Measures       Row Name 04/02/24 1250 04/01/24 1600 03/31/24 1500       How much help from another person do you currently need...    Turning from your back to your side while in flat bed without using bedrails? 2  -RH 2  -VK 2  -CS    Moving from lying on back to sitting on the side of a flat bed without  bedrails? 2  -RH 2  -VK 2  -CS    Moving to and from a bed to a chair (including a wheelchair)? 2  -RH 2  -VK 2  -CS    Standing up from a chair using your arms (e.g., wheelchair, bedside chair)? 2  -RH 2  -VK 2  -CS    Climbing 3-5 steps with a railing? 1  -RH 1  -VK 1  -CS    To walk in hospital room? 1  -RH 1  -VK 1  -CS    AM-PAC 6 Clicks Score (PT) 10  -RH 10  -VK 10  -CS    Highest Level of Mobility Goal 4 --> Transfer to chair/commode  -RH 4 --> Transfer to chair/commode  -VK 4 --> Transfer to chair/commode  -CS       Functional Assessment    Outcome Measure Options -- -- AM-PAC 6 Clicks Basic Mobility (PT)  -CS      Row Name 03/30/24 1300             How much help from another person do you currently need...    Turning from your back to your side while in flat bed without using bedrails? 2  -VK      Moving from lying on back to sitting on the side of a flat bed without bedrails? 2  -VK      Moving to and from a bed to a chair (including a wheelchair)? 2  -VK      Standing up from a chair using your arms (e.g., wheelchair, bedside chair)? 2  -VK      Climbing 3-5 steps with a railing? 1  -VK      To walk in hospital room? 2  -VK      AM-PAC 6 Clicks Score (PT) 11  -VK      Highest Level of Mobility Goal 4 --> Transfer to chair/commode  -VK                User Key  (r) = Recorded By, (t) = Taken By, (c) = Cosigned By      Initials Name Provider Type     Tony Lieberman, CARLYN Physical Therapist Assistant    CS Woody Cook PTA Physical Therapist Assistant    VK Saira Fletcher PTA Physical Therapist Assistant                     Time Calculation:    PT Charges       Row Name 04/02/24 1241             Time Calculation    PT Received On 04/02/24  -RH         Timed Charges    34774 - PT Therapeutic Exercise Minutes 26  -RH      05111 - PT Therapeutic Activity Minutes 12  -RH         Total Minutes    Timed Charges Total Minutes 38  -RH       Total Minutes 38  -RH                User Key  (r) = Recorded By, (t) =  Taken By, (c) = Cosigned By      Initials Name Provider Type     Tony Lieberman PTA Physical Therapist Assistant                  Therapy Charges for Today       Code Description Service Date Service Provider Modifiers Qty    78653832140  PT THER PROC EA 15 MIN 4/2/2024 Tony Lieberman PTA GP 2    [id number]  PT THERAPEUTIC ACT EA 15 MIN 4/2/2024 Tony Lieberman PTA GP 1            PT G-Codes  Outcome Measure Options: AM-PAC 6 Clicks Basic Mobility (PT)  AM-PAC 6 Clicks Score (PT): 10  AM-PAC 6 Clicks Score (OT): 12    Tony Lieberman PTA  4/2/2024

## 2024-04-03 ENCOUNTER — APPOINTMENT (OUTPATIENT)
Dept: GENERAL RADIOLOGY | Facility: HOSPITAL | Age: 84
End: 2024-04-03
Payer: MEDICARE

## 2024-04-03 PROBLEM — N18.9 ACUTE RENAL FAILURE SUPERIMPOSED ON CHRONIC KIDNEY DISEASE: Status: ACTIVE | Noted: 2024-04-02

## 2024-04-03 LAB
ALBUMIN SERPL-MCNC: 3.1 G/DL (ref 3.5–5.2)
ALBUMIN/GLOB SERPL: 0.9 G/DL
ALP SERPL-CCNC: 85 U/L (ref 39–117)
ALT SERPL W P-5'-P-CCNC: 36 U/L (ref 1–41)
ANION GAP SERPL CALCULATED.3IONS-SCNC: 6.8 MMOL/L (ref 5–15)
AST SERPL-CCNC: 52 U/L (ref 1–40)
BACTERIA SPEC AEROBE CULT: NORMAL
BACTERIA SPEC AEROBE CULT: NORMAL
BASOPHILS # BLD AUTO: 0.1 10*3/MM3 (ref 0–0.2)
BASOPHILS NFR BLD AUTO: 0.6 % (ref 0–1.5)
BILIRUB SERPL-MCNC: 0.5 MG/DL (ref 0–1.2)
BUN SERPL-MCNC: 54 MG/DL (ref 8–23)
BUN/CREAT SERPL: 37.5 (ref 7–25)
CALCIUM SPEC-SCNC: 9.4 MG/DL (ref 8.6–10.5)
CHLORIDE SERPL-SCNC: 100 MMOL/L (ref 98–107)
CO2 SERPL-SCNC: 39.2 MMOL/L (ref 22–29)
CREAT SERPL-MCNC: 1.44 MG/DL (ref 0.76–1.27)
DEPRECATED RDW RBC AUTO: 53 FL (ref 37–54)
EGFRCR SERPLBLD CKD-EPI 2021: 48.8 ML/MIN/1.73
EOSINOPHIL # BLD AUTO: 0.19 10*3/MM3 (ref 0–0.4)
EOSINOPHIL NFR BLD AUTO: 1.2 % (ref 0.3–6.2)
ERYTHROCYTE [DISTWIDTH] IN BLOOD BY AUTOMATED COUNT: 14 % (ref 12.3–15.4)
GLOBULIN UR ELPH-MCNC: 3.6 GM/DL
GLUCOSE SERPL-MCNC: 140 MG/DL (ref 65–99)
HCT VFR BLD AUTO: 33.8 % (ref 37.5–51)
HGB BLD-MCNC: 10.4 G/DL (ref 13–17.7)
IMM GRANULOCYTES # BLD AUTO: 0.16 10*3/MM3 (ref 0–0.05)
IMM GRANULOCYTES NFR BLD AUTO: 1 % (ref 0–0.5)
LYMPHOCYTES # BLD AUTO: 1.39 10*3/MM3 (ref 0.7–3.1)
LYMPHOCYTES NFR BLD AUTO: 8.9 % (ref 19.6–45.3)
MAGNESIUM SERPL-MCNC: 2.5 MG/DL (ref 1.6–2.4)
MCH RBC QN AUTO: 31.4 PG (ref 26.6–33)
MCHC RBC AUTO-ENTMCNC: 30.8 G/DL (ref 31.5–35.7)
MCV RBC AUTO: 102.1 FL (ref 79–97)
MONOCYTES # BLD AUTO: 0.99 10*3/MM3 (ref 0.1–0.9)
MONOCYTES NFR BLD AUTO: 6.3 % (ref 5–12)
NEUTROPHILS NFR BLD AUTO: 12.8 10*3/MM3 (ref 1.7–7)
NEUTROPHILS NFR BLD AUTO: 82 % (ref 42.7–76)
NRBC BLD AUTO-RTO: 0 /100 WBC (ref 0–0.2)
PHOSPHATE SERPL-MCNC: 3.9 MG/DL (ref 2.5–4.5)
PLATELET # BLD AUTO: 261 10*3/MM3 (ref 140–450)
PMV BLD AUTO: 11.4 FL (ref 6–12)
POTASSIUM SERPL-SCNC: 4.4 MMOL/L (ref 3.5–5.2)
PROT SERPL-MCNC: 6.7 G/DL (ref 6–8.5)
RBC # BLD AUTO: 3.31 10*6/MM3 (ref 4.14–5.8)
SODIUM SERPL-SCNC: 146 MMOL/L (ref 136–145)
WBC NRBC COR # BLD AUTO: 15.63 10*3/MM3 (ref 3.4–10.8)

## 2024-04-03 PROCEDURE — 25010000002 NA FERRIC GLUC CPLX PER 12.5 MG: Performed by: INTERNAL MEDICINE

## 2024-04-03 PROCEDURE — 94799 UNLISTED PULMONARY SVC/PX: CPT

## 2024-04-03 PROCEDURE — 85025 COMPLETE CBC W/AUTO DIFF WBC: CPT | Performed by: INTERNAL MEDICINE

## 2024-04-03 PROCEDURE — 83735 ASSAY OF MAGNESIUM: CPT | Performed by: INTERNAL MEDICINE

## 2024-04-03 PROCEDURE — 71046 X-RAY EXAM CHEST 2 VIEWS: CPT

## 2024-04-03 PROCEDURE — 25810000003 SODIUM CHLORIDE 0.9 % SOLUTION: Performed by: INTERNAL MEDICINE

## 2024-04-03 PROCEDURE — 25010000002 ENOXAPARIN PER 10 MG: Performed by: INTERNAL MEDICINE

## 2024-04-03 PROCEDURE — 97530 THERAPEUTIC ACTIVITIES: CPT

## 2024-04-03 PROCEDURE — 94664 DEMO&/EVAL PT USE INHALER: CPT

## 2024-04-03 PROCEDURE — 84100 ASSAY OF PHOSPHORUS: CPT | Performed by: INTERNAL MEDICINE

## 2024-04-03 PROCEDURE — 99233 SBSQ HOSP IP/OBS HIGH 50: CPT | Performed by: INTERNAL MEDICINE

## 2024-04-03 PROCEDURE — 97110 THERAPEUTIC EXERCISES: CPT

## 2024-04-03 PROCEDURE — 80053 COMPREHEN METABOLIC PANEL: CPT | Performed by: INTERNAL MEDICINE

## 2024-04-03 RX ORDER — HYDROCODONE BITARTRATE AND ACETAMINOPHEN 7.5; 325 MG/1; MG/1
2 TABLET ORAL EVERY 4 HOURS PRN
Status: DISCONTINUED | OUTPATIENT
Start: 2024-04-03 | End: 2024-04-04 | Stop reason: HOSPADM

## 2024-04-03 RX ADMIN — TAMSULOSIN HYDROCHLORIDE 0.4 MG: 0.4 CAPSULE ORAL at 09:32

## 2024-04-03 RX ADMIN — DOCUSATE SODIUM 50MG AND SENNOSIDES 8.6MG 2 TABLET: 8.6; 5 TABLET, FILM COATED ORAL at 09:32

## 2024-04-03 RX ADMIN — HYDROCODONE BITARTRATE AND ACETAMINOPHEN 1 TABLET: 7.5; 325 TABLET ORAL at 22:08

## 2024-04-03 RX ADMIN — FAMOTIDINE 20 MG: 20 TABLET ORAL at 09:32

## 2024-04-03 RX ADMIN — BUDESONIDE 0.5 MG: 0.5 INHALANT ORAL at 07:35

## 2024-04-03 RX ADMIN — ALLOPURINOL 300 MG: 300 TABLET ORAL at 09:46

## 2024-04-03 RX ADMIN — ARFORMOTEROL TARTRATE 15 MCG: 15 SOLUTION RESPIRATORY (INHALATION) at 19:32

## 2024-04-03 RX ADMIN — Medication 10 ML: at 22:08

## 2024-04-03 RX ADMIN — SODIUM CHLORIDE 250 MG: 9 INJECTION, SOLUTION INTRAVENOUS at 09:45

## 2024-04-03 RX ADMIN — ENOXAPARIN SODIUM 30 MG: 100 INJECTION SUBCUTANEOUS at 09:32

## 2024-04-03 RX ADMIN — HYDROCODONE BITARTRATE AND ACETAMINOPHEN 1 TABLET: 7.5; 325 TABLET ORAL at 03:37

## 2024-04-03 RX ADMIN — HYDROCODONE BITARTRATE AND ACETAMINOPHEN 1 TABLET: 7.5; 325 TABLET ORAL at 09:45

## 2024-04-03 RX ADMIN — Medication 10 ML: at 09:46

## 2024-04-03 RX ADMIN — NICOTINE 1 PATCH: 21 PATCH, EXTENDED RELEASE TRANSDERMAL at 09:34

## 2024-04-03 RX ADMIN — BUDESONIDE 0.5 MG: 0.5 INHALANT ORAL at 19:37

## 2024-04-03 RX ADMIN — ARFORMOTEROL TARTRATE 15 MCG: 15 SOLUTION RESPIRATORY (INHALATION) at 07:35

## 2024-04-03 NOTE — PROGRESS NOTES
[facility]     Progress Note    Patient Name: Clark Layton  : 1940  MRN: [id number]  Primary Care Physician:  Honorio Field MD  Date of admission: 3/26/2024    Subjective patient fully awake alert responsive not in any acute distress he is doing well.  Renal function is very stable    Review of Systems  Constitutional:        Weakness tiredness fatigue  Eyes:                       No blurry vision, eye discharge, eye irritation, eye pain  HEENT:                   No acute hair loss, earache and discharge, nasal congestion or discharge, sore throat, postnasal drip  Respiratory:           No shortness of breath coughing sputum production wheezing hemoptysis pleuritic chest pain  Cardiovascular:     No chest pain, orthopnea, PND, dizziness, palpitation, lower extremity edema  Gastrointestinal:   No nausea vomiting diarrhea abdominal pain constipation  Genitourinary:       No urinary incontinence, hesitancy, frequency, urgency, dysuria  Hematologic:         No bruising, bleeding, pallor, lymphadenopathy  Endocrine:            No coldness, hot flashes, polyuria, abnormal hair growth  Musculoskeletal:    No body pains, aches, arthritic pains, muscle pain ,muscle wasting  Psychiatric:          No low or high mood, anxiety, hallucinations, delusions  Skin.                      No rash, ulcers, bruising, itching  Neurological:        No confusion, headache, focal weakness, numbness, dysphasia    Objective   Objective     Vitals:   Temp:  [97.5 °F (36.4 °C)-100.5 °F (38.1 °C)] 98.2 °F (36.8 °C)  Heart Rate:  [] 67  Resp:  [16-20] 18  BP: (116-131)/(49-63) 130/61  Flow (L/min):  [2-4] 4  Physical Exam    Constitutional: Awake, alert responsive, conversant, no obvious distress              Psychiatric:  Appropriate affect, cooperative   Neurologic:  Awake alert ,oriented x 3, strength symmetric in all extremities, Cranial Nerves grossly intact to confrontation, speech clear   Eyes:   PERRLA  sclerae anicteric, no conjunctival injection   HEENT:  Moist mucous membranes, no nasal or eye discharge, no throat congestion   Neck:   Supple, no thyromegaly, no lymphadenopathy, trachea midline, no elevated JVD   Respiratory:  Clear to auscultation bilaterally, nonlabored respirations    Cardiovascular: RRR, no murmurs, rubs, or gallops, palpable pedal pulses bilaterally, No bilateral ankle edema   Gastrointestinal: Positive bowel sounds, soft, nontender, nondistended, no organomegaly   Musculoskeletal:  No clubbing or cyanosis to extremities,muscle wasting, joint swelling, muscle weakness             Skin:                      No rashes, bruising, skin ulcers, petechiae or ecchymosis    Result Review    Result Review:  I have personally reviewed the results from the time of this admission to 4/3/2024 08:49 EDT and agree with these findings:  []  Laboratory  []  Microbiology  []  Radiology  []  EKG/Telemetry   []  Cardiology/Vascular   []  Pathology  []  Old records  []  Other:    Results from last 7 days   Lab Units 04/03/24  0236 04/02/24  0215 03/30/24 0213 03/29/24 0337 03/28/24 0217   WBC 10*3/mm3 15.63* 17.42* 9.38 8.83 13.67*   HEMOGLOBIN g/dL 10.4* 10.6* 10.1* 10.0* 10.9*   PLATELETS 10*3/mm3 261 239 160 150 161     Results from last 7 days   Lab Units 04/03/24  0236 04/02/24  0215 04/01/24  0222 03/31/24  0230 03/30/24 0213 03/29/24 0337 03/28/24 0217   SODIUM mmol/L 146* 142 142 140 138 138 137   POTASSIUM mmol/L 4.4 3.1* 3.5 3.5 3.6 3.9 4.7   CHLORIDE mmol/L 100 94* 95* 92* 94* 97* 100   CO2 mmol/L 39.2* 37.4* 38.3* 36.0* 37.5* 34.4* 28.9   BUN mg/dL 54* 47* 45* 36* 32* 35* 35*   CREATININE mg/dL 1.44* 1.35* 1.45* 1.33* 1.54* 1.54* 2.02*   GLUCOSE mg/dL 140* 145* 132* 158* 132* 101* 115*       Assessment & Plan   Assessment / Plan       Active Hospital Problems:    Active Hospital Problems    Diagnosis  POA    **Hip fracture [S72.009A]  Yes    Acute renal failure superimposed on chronic kidney  disease [N17.9, N18.9]  Unknown     Baseline creatinine around 1.4      Iron deficiency anemia [D50.9]  Unknown     Give IV iron      Metabolic alkalosis [E87.3]  Unknown     From diuretics      History of dementia [Z86.59]  Not Applicable    Gout [M10.9]  Yes    Essential hypertension [I10]  Yes    Nicotine dependence, cigarettes, with other nicotine-induced disorders [F17.218]  Yes       Plan:   Continue present care  Discharge planning per primary team       Electronically signed by Milli Nelson MD, 04/03/24, 8:49 AM EDT.

## 2024-04-03 NOTE — PLAN OF CARE
Goal Outcome Evaluation:         Pt remains confused, incontinent of bowel and bladder, medicated for pain x1. Pt was satting 84% - 87% on O2 2L/NC start of shift. O2 was increase to 3L then 4L/NC then O2 went up to 90 - 93%. Acetaminophen 600 mg supp was also given x1 d/t increase in temp (100.5). No other changes noted. Family at bedside at all times. Will continue POC.

## 2024-04-03 NOTE — NURSING NOTE
The patient is on 2 north, in bed with HOB elevated. The patients spouse and daughter at the bedside. I reviewed the patients D-POA to ensure we had the most up to date copy. I educated on and completed an EMS DNR with the spouse, copy to MR, original to chart and copy to family for home use. The family report the patient will DC tomorrow to [facility] and then back home. Expressed no additional services at home are needed at this time. No questions or concerns expressed. Primary nurse updated.     -Guillermina VANG, RN, [facility]   Palliative Care    4/3/2024 14:14 EDT

## 2024-04-03 NOTE — THERAPY TREATMENT NOTE
Acute Care - Physical Therapy Treatment Note   [facility]     Patient Name: Clark Layton  : 1940  MRN: [id number]  Today's Date: 4/3/2024      Visit Dx:     ICD-10-CM ICD-9-CM   1. Left displaced femoral neck fracture  S72.002A 820.8   2. Fall from chair, initial encounter  W07.XXXA E884.2   3. Dementia without behavioral disturbance, psychotic disturbance, mood disturbance, or anxiety, unspecified dementia severity, unspecified dementia type  F03.90 294.20   4. Impaired mobility and ADLs  Z74.09 V49.89    Z78.9    5. Difficulty in walking  R26.2 719.7   6. Dysphagia, unspecified type  R13.10 787.20     Patient Active Problem List   Diagnosis    Nicotine dependence, cigarettes, with other nicotine-induced disorders    High cholesterol    Gout    Essential hypertension    Medication management    Benign prostatic hyperplasia    Left displaced femoral neck fracture    Hip fracture    History of dementia    Acute renal failure superimposed on chronic kidney disease    Iron deficiency anemia    Metabolic alkalosis     Past Medical History:   Diagnosis Date    Essential hypertension     Gout     High cholesterol 2018    Medication management 2018    Nicotine dependence, cigarettes, with other nicotine-induced disorders 2018     Past Surgical History:   Procedure Laterality Date    HIP BIPOLAR REPLACEMENT Left 3/27/2024    Procedure: HIP BIPOLAR ANTERIOR;  Surgeon: Bethany Carlson MD;  Location: [facility];  Service: Orthopedics;  Laterality: Left;     PT Assessment (Last 12 Hours)       PT Evaluation and Treatment       Row Name 24 1146          Physical Therapy Time and Intention    Subjective Information complains of;weakness  -RH     Document Type therapy note (daily note)  -     Mode of Treatment physical therapy;individual therapy  -     Patient Effort adequate  -       Row Name 24 1146          Pain Scale: FACES Pre/Post-Treatment    Pain: FACES Scale, Pretreatment  0-->no hurt  -     Posttreatment Pain Rating 0-->no hurt  -       Row Name 04/03/24 1146          Range of Motion (ROM)    Range of Motion --  Pt L hip AAROM at 60 degrees flex and 20 degrees abd.  -[facility] Name 04/03/24 1146          Strength (Manual Muscle Testing)    Strength (Manual Muscle Testing) --  Pt L hip flex strength at 2/5.  -[facility] Name 04/03/24 1146          Mobility    Extremity Weight-bearing Status left lower extremity  -     Left Lower Extremity (Weight-bearing Status) weight-bearing as tolerated (WBAT)  -[facility] Name 04/03/24 1146          Bed Mobility    Bed Mobility sit-supine  -     Scooting/Bridging Crisp (Bed Mobility) moderate assist (50% patient effort)  -     Supine-Sit [place] (Bed Mobility) moderate assist (50% patient effort)  -     Sit-Supine [place]p (Bed Mobility) moderate assist (50% patient effort)  -     Bed Mobility, Safety Issues cognitive deficits limit understanding;decreased use of legs for bridging/pushing  -     Assistive Device (Bed Mobility) bed rails  -     Comment, (Bed Mobility) Pt maintains sitting with CGA.  -[facility] Name 04/03/24 1146          Transfers    Transfers squat pivot transfer  -     Maintains Weight-bearing Status (Transfers) able to maintain  -     Comment, (Transfers) Pt able to perform pivot transfer without AD with shuffling steps.  -[facility] Name 04/03/24 1146          Stand Pivot/Stand Step Transfer    Stand Pivot/Stand Step Crisp (Transfers) moderate assist (50% patient effort)  -     Assistive Device (Stand Pivot Stand Step Transfer) --  Pt able to perform pivot transfer without AD.  -       Row Name 04/03/24 1146          Balance    Dynamic Standing Balance moderate assist  -       Row Name             Wound 03/27/24 1400 Left lateral thigh Incision    Wound - Properties Group Placement Date: 03/27/24  -AR Placement Time: 1400  -AR Side: Left  -AR Orientation: lateral   -AR Location: thigh  -AR Primary Wound Type: Incision  -AR    Retired Wound - Properties Group Placement Date: 03/27/24  -AR Placement Time: 1400  -AR Side: Left  -AR Orientation: lateral  -AR Location: thigh  -AR Primary Wound Type: Incision  -AR    Retired Wound - Properties Group Date first assessed: 03/27/24  -AR Time first assessed: 1400  -AR Side: Left  -AR Location: thigh  -AR Primary Wound Type: Incision  -AR      Row Name             Wound 03/31/24 0935 Left midline gluteal Pressure Injury    Wound - Properties Group Placement Date: 03/31/24  -MH Placement Time: 0935  -MH Present on Original Admission: N  -MH Side: Left  -MH Orientation: midline  -MH Location: gluteal  -MH Primary Wound Type: Pressure inj  -MH    Retired Wound - Properties Group Placement Date: 03/31/24  -MH Placement Time: 0935  -MH Present on Original Admission: N  -MH Side: Left  -MH Orientation: midline  -MH Location: gluteal  -MH Primary Wound Type: Pressure inj  -MH    Retired Wound - Properties Group Date first assessed: 03/31/24  - Time first assessed: 0935  -MH Present on Original Admission: N  -MH Side: Left  -MH Location: gluteal  -MH Primary Wound Type: Pressure inj  -MH      Row Name 04/03/24 1146          Vital Signs    O2 Delivery Post Treatment --  Pt on 2L with nasal cannula.  -       Row Name 04/03/24 1146          Positioning and Restraints    In Bed call light within reach;supine;encouraged to call for assist;exit alarm on;with family/caregiver  -       Row Name 04/03/24 1146          Progress Summary (PT)    Progress Toward Functional Goals (PT) progress toward functional goals is fair  -RH               User Key  (r) = Recorded By, (t) = Taken By, (c) = Cosigned By      Initials Name Provider Type    AR Race, April, RN CSA Registered Nurse    RH Tony Lieberman PTA Physical Therapist Assistant    Nancy Ayala RN Registered Nurse                Left Lower Extremity   Exercise  Reps  Sets    Short arc quads    10 2   Heel slides  10 2   Ankle pumps  10 2   Quad sets  10 2   Gluteal sets 10 2   Hip ab/adduction 10 2            PT Recommendation and Plan     Progress Summary (PT)  Progress Toward Functional Goals (PT): progress toward functional goals is fair   Outcome Measures       Row Name 04/03/24 1100 04/02/24 1250 04/01/24 1600       How much help from another person do you currently need...    Turning from your back to your side while in flat bed without using bedrails? 2  -RH 2  -RH 2  -VK    Moving from lying on back to sitting on the side of a flat bed without bedrails? 2  -RH 2  -RH 2  -VK    Moving to and from a bed to a chair (including a wheelchair)? 2  -RH 2  -RH 2  -VK    Standing up from a chair using your arms (e.g., wheelchair, bedside chair)? 2  -RH 2  -RH 2  -VK    Climbing 3-5 steps with a railing? 1  -RH 1  -RH 1  -VK    To walk in hospital room? 2  -RH 1  -RH 1  -VK    AM-PAC 6 Clicks Score (PT) 11  -RH 10  -RH 10  -VK    Highest Level of Mobility Goal 4 --> Transfer to chair/commode  -RH 4 --> Transfer to chair/commode  -RH 4 --> Transfer to chair/commode  -VK      Row Name 03/31/24 1500             How much help from another person do you currently need...    Turning from your back to your side while in flat bed without using bedrails? 2  -CS      Moving from lying on back to sitting on the side of a flat bed without bedrails? 2  -CS      Moving to and from a bed to a chair (including a wheelchair)? 2  -CS      Standing up from a chair using your arms (e.g., wheelchair, bedside chair)? 2  -CS      Climbing 3-5 steps with a railing? 1  -CS      To walk in hospital room? 1  -CS      AM-PAC 6 Clicks Score (PT) 10  -CS      Highest Level of Mobility Goal 4 --> Transfer to chair/commode  -CS         Functional Assessment    Outcome Measure Options AM-PAC 6 Clicks Basic Mobility (PT)  -CS                User Key  (r) = Recorded By, (t) = Taken By, (c) = Cosigned By      Initials Name Provider Type      Tony Lieberman PTA Physical Therapist Assistant    Woody Fields, CARLYN Physical Therapist Assistant    Saira Lopez PTA Physical Therapist Assistant                     Time Calculation:    PT Charges       Row Name 04/03/24 1146             Time Calculation    PT Received On 04/03/24  -RH         Timed Charges    34919 - PT Therapeutic Exercise Minutes 25  -RH      08742 - PT Therapeutic Activity Minutes 14  -RH         Total Minutes    Timed Charges Total Minutes 39  -RH       Total Minutes 39  -RH                User Key  (r) = Recorded By, (t) = Taken By, (c) = Cosigned By      Initials Name Provider Type     Tony Lieberman PTA Physical Therapist Assistant                  Therapy Charges for Today       Code Description Service Date Service Provider Modifiers Qty    70097879004 HC PT THER PROC EA 15 MIN 4/2/2024 Tony Lieberman, CARLYN GP 2    37493273300 HC PT THERAPEUTIC ACT EA 15 MIN 4/2/2024 Tony Lieberman, CARLYN GP 1    33812055303 HC PT THER PROC EA 15 MIN 4/3/2024 Tony Lieberman, CARLYN GP 2    80717443153 HC PT THERAPEUTIC ACT EA 15 MIN 4/3/2024 Tony Lieberman, CARLYN GP 1            PT G-Codes  Outcome Measure Options: AM-PAC 6 Clicks Basic Mobility (PT)  AM-PAC 6 Clicks Score (PT): 11  AM-PAC 6 Clicks Score (OT): 12    Tony Lieberman PTA  4/3/2024

## 2024-04-03 NOTE — PROGRESS NOTES
[facility]   Hospitalist Progress Note  Date: 4/3/2024  Patient Name: Clark Layton  : 1940  MRN: [id number]  Date of admission: 3/26/2024      Subjective   Subjective     Chief Complaint: Fall at home and left hip pain    Summary:   Clark Layton is a 83 y.o. male with past medical history of hypertension, Alzheimer's hyperlipidemia, BPH, nicotine dependence, and GERD presented to the ED after a fall resulting in left hip injury.  Patient has advanced dementia so history was given by wife at bedside.  As per wife he was sitting on a stool on the kitchen island when he fell asleep and fell to the floor.  Patient with severe pain afterwards and was then transferred to the ED for further evaluation.  In the ED patient was hypoxic on arrival with remaining vitals being within normal limits.  X-ray of the hip showed a left femoral neck fracture with CT confirming nondisplaced left femoral neck fracture.  Orthopedic surgery was consulted and performed successful hip anterior arthroplasty.  Postoperative course complicated by hypoxemia, BÁRBARA.  Pulmonology and nephrology were consulted.  He was diuresed with improvement.  He is having some difficulty working with therapy as appetite has been poor.  However, he is improving and plan is to discharge to rehab when medically stable.    Interval Followup:    4/3/2024  Son at bedside  Patient sitting up side of bed working with physical therapy  O2 sats low 80s on room air.  Placed back on 2 L.  Rested well last night  Left hip pain reasonably controlled  Pleasant demeanor.  Calm manner.  Not fully oriented.    Objective   Objective     Vitals:   Temp:  [97.5 °F (36.4 °C)-100.5 °F (38.1 °C)] 98.2 °F (36.8 °C)  Heart Rate:  [] 67  Resp:  [16-20] 18  BP: (116-131)/(49-63) 130/61  Flow (L/min):  [2-4] 4  Physical Exam   GEN: No acute distress.  Alert and conversational.  HEENT: Moist mucous membranes  LUNGS: Equal chest rise bilaterally.  No wheeze.  Diminished  left base.  CARDIAC: Regular rate and rhythm  NEURO: Moving all 4 extremities spontaneously.  Left thigh dressings intact.  SKIN: No obvious breakdown    Result Review    I have personally reviewed the results for the past 24 hours and agree with these findings:  [x]  Laboratory personally reviewed BMP, magnesium, phosphorus  CBC          3/30/2024    02:13 4/2/2024    02:15 4/3/2024    02:36   CBC   WBC 9.38  17.42  15.63    RBC 3.31  3.36  3.31    Hemoglobin 10.1  10.6  10.4    Hematocrit 33.4  33.3  33.8    .9  99.1  102.1    MCH 30.5  31.5  31.4    MCHC 30.2  31.8  30.8    RDW 13.9  14.2  14.0    Platelets 160  239  261      CMP          4/1/2024    02:22 4/2/2024    02:15 4/3/2024    02:36   CMP   Glucose 132  145  140    BUN 45  47  54    Creatinine 1.45  1.35  1.44    EGFR 48.4  52.7  48.8    Sodium 142  142  146    Potassium 3.5  3.1  4.4    Chloride 95  94  100    Calcium 9.1  9.1  9.4    Total Protein   6.7    Albumin 3.2   3.1    Globulin   3.6    Total Bilirubin   0.5    Alkaline Phosphatase   85    AST (SGOT)   52    ALT (SGPT)   36    Albumin/Globulin Ratio   0.9    BUN/Creatinine Ratio 31.0  34.8  37.5    Anion Gap 8.7  10.6  6.8      []  Microbiology  [x]  Radiology  [x]  EKG/Telemetry   []  Cardiology/Vascular   []  Pathology  [x]  Old records  [x]  Other: Medications    Assessment & Plan   Assessment / Plan     ASSESSMENT:  Status post mechanical fall   Left femoral neck fracture   Status post L hip hemiarthroplasty on March 27 Dr. Carlson.  Hypoxic respiratory failure  Acute volume overload  Likely COPD given lifelong history of smoking   Hypertension  Gout  Hyperlipidemia.  Alzheimer's dementia.  BPH  Hematuria due to Crowder catheter  Leukocytosis.  Likely reactive.  Appears resolved.  BÁRBARA, resolved   Contraction alkalosis from diuretics   Elevated D dimer in the postop patient.  Anemia.  Multifactorial.  Stable.   CODE STATUS: DNR     PLAN:  Disposition: Likely [facility] in  next 24 hours  Encourage incentive spirometer.  2 view chest x-ray this morning.    Keep sats 90-92% range   continue to monitor in the hospital for workup and management of the above  Nephrology consulted.    Holding Lasix secondary to contraction alkalosis  Continue with Pulmicort and Brovana twice daily, DuoNebs as needed  Will need walk test prior to returning home from rehab  Oral and IV narcotics for pain control on as-needed basis.  Completed course of antibiotics for presumed pneumonia  Delirium precautions  Continue home Flomax and allopurinol  Continue scheduled bowel regimen.  PT/OT following-recommend inpatient rehab   CBC, CMP reviewed  Repeat CBC, CMP, mag and Phos in a.m.    Reviewed patients labs and imaging, and discussed with patient and nurse at bedside.      DVT prophylaxis:  Medical and mechanical DVT prophylaxis orders are present.    CODE STATUS:   Medical Intervention Limits: NO intubation (DNI); NO cardioversion  Code Status (Patient has no pulse and is not breathing): No CPR (Do Not Attempt to Resuscitate)  Medical Interventions (Patient has pulse or is breathing): Limited Support         Patient independently seen and evaluated, agree with assessment and plan, above documentation reflects plan put forth during bedside rounds.  More than 51% of the time of this patient encounter was performed by me.    Interval history:  No acute events overnight, patient much more alert today, tolerating food    GEN: No acute distress  HEENT: Moist mucous membranes  LUNGS: Equal chest rise bilaterally  CARDIAC: Regular rate and rhythm  NEURO: Moving all 4 extremities spontaneously  SKIN: No obvious breakdown    Plan:  Agree with assessment plan as above  Continue PT/OT  Continue to encourage oral intake  Volume status adequate, continue to hold diuresis  Wean O2 for SpO2 greater than 88%  Continue Flomax  Continue pain medicine  Continue Brovana and Pulmicort  CBC, CMP reviewed  Repeat CBC, CMP, mag and  Phos in a.m.    Time spent: Time spent involving patient care including face-to-face encounter 52 minutes      Electronically signed by Jamal Eaton MD, 04/03/24, 1:54 PM EDT.

## 2024-04-03 NOTE — PROGRESS NOTES
Orthopedic hemiarthroplasty for fracture progress Note    Assessment/Plan working with PT/OT, weight-bear as tolerated, fall precautions, will need rehab placement, DVT prophylaxis     Status post-left Turner hip arthroplasty: Doing well postoperatively.  However some mild confusion persists    Pain Relief: some relief    Continues current post-op course    Activity: up with assistance    Weight Bearing: WBAT     LOS: 0 days     Subjective     Post-Operative Day: 7 post-op turner hip arthroplasty  Systemic or Specific Complaints: No Complaints    Objective     Vital signs in last 24 hours:  Vitals:    04/03/24 0349 04/03/24 0710 04/03/24 0730 04/03/24 0734   BP: 118/52 130/61     BP Location: Right arm Right arm     Patient Position: Lying Lying     Pulse: 72 69 68 67   Resp: 18 18 18 18   Temp: 98.3 °F (36.8 °C) 98.2 °F (36.8 °C)     TempSrc: Oral Oral     SpO2: 93% 97% 96% 97%   Weight:       Height:            General: Resting well this morning   Neurovascular: Neurovascular intact  Capillary refill: Normal   Wound: Dressing is clean and dry no evidence of infection.   Range of Motion: Limited flexsion and Limited extension   DVT Exam: Calf soft, no sign of DVT      WBC   Date Value Ref Range Status   04/03/2024 15.63 (H) 3.40 - 10.80 10*3/mm3 Final     RBC   Date Value Ref Range Status   04/03/2024 3.31 (L) 4.14 - 5.80 10*6/mm3 Final     Hemoglobin   Date Value Ref Range Status   04/03/2024 10.4 (L) 13.0 - 17.7 g/dL Final     Hematocrit   Date Value Ref Range Status   04/03/2024 33.8 (L) 37.5 - 51.0 % Final     MCV   Date Value Ref Range Status   04/03/2024 102.1 (H) 79.0 - 97.0 fL Final     MCH   Date Value Ref Range Status   04/03/2024 31.4 26.6 - 33.0 pg Final     MCHC   Date Value Ref Range Status   04/03/2024 30.8 (L) 31.5 - 35.7 g/dL Final     RDW   Date Value Ref Range Status   04/03/2024 14.0 12.3 - 15.4 % Final     RDW-SD   Date Value Ref Range Status   04/03/2024 53.0 37.0 - 54.0 fl Final     MPV   Date  Value Ref Range Status   04/03/2024 11.4 6.0 - 12.0 fL Final     Platelets   Date Value Ref Range Status   04/03/2024 261 140 - 450 10*3/mm3 Final     Neutrophil %   Date Value Ref Range Status   04/03/2024 82.0 (H) 42.7 - 76.0 % Final     Lymphocyte %   Date Value Ref Range Status   04/03/2024 8.9 (L) 19.6 - 45.3 % Final     Monocyte %   Date Value Ref Range Status   04/03/2024 6.3 5.0 - 12.0 % Final     Eosinophil %   Date Value Ref Range Status   04/03/2024 1.2 0.3 - 6.2 % Final     Basophil %   Date Value Ref Range Status   04/03/2024 0.6 0.0 - 1.5 % Final     Immature Grans %   Date Value Ref Range Status   04/03/2024 1.0 (H) 0.0 - 0.5 % Final     Neutrophils, Absolute   Date Value Ref Range Status   04/03/2024 12.80 (H) 1.70 - 7.00 10*3/mm3 Final     Lymphocytes, Absolute   Date Value Ref Range Status   04/03/2024 1.39 0.70 - 3.10 10*3/mm3 Final     Monocytes, Absolute   Date Value Ref Range Status   04/03/2024 0.99 (H) 0.10 - 0.90 10*3/mm3 Final     Eosinophils, Absolute   Date Value Ref Range Status   04/03/2024 0.19 0.00 - 0.40 10*3/mm3 Final     Basophils, Absolute   Date Value Ref Range Status   04/03/2024 0.10 0.00 - 0.20 10*3/mm3 Final     Immature Grans, Absolute   Date Value Ref Range Status   04/03/2024 0.16 (H) 0.00 - 0.05 10*3/mm3 Final     nRBC   Date Value Ref Range Status   04/03/2024 0.0 0.0 - 0.2 /100 WBC Final        Basic Metabolic Panel    Sodium Sodium   Date Value Ref Range Status   04/03/2024 146 (H) 136 - 145 mmol/L Final   04/02/2024 142 136 - 145 mmol/L Final   04/01/2024 142 136 - 145 mmol/L Final      Potassium Potassium   Date Value Ref Range Status   04/03/2024 4.4 3.5 - 5.2 mmol/L Final   04/02/2024 3.1 (L) 3.5 - 5.2 mmol/L Final   04/01/2024 3.5 3.5 - 5.2 mmol/L Final      Chloride Chloride   Date Value Ref Range Status   04/03/2024 100 98 - 107 mmol/L Final   04/02/2024 94 (L) 98 - 107 mmol/L Final   04/01/2024 95 (L) 98 - 107 mmol/L Final      Bicarbonate No results found for:  "\"PLASMABICARB\"   BUN BUN   Date Value Ref Range Status   04/03/2024 54 (H) 8 - 23 mg/dL Final   04/02/2024 47 (H) 8 - 23 mg/dL Final   04/01/2024 45 (H) 8 - 23 mg/dL Final      Creatinine Creatinine   Date Value Ref Range Status   04/03/2024 1.44 (H) 0.76 - 1.27 mg/dL Final   04/02/2024 1.35 (H) 0.76 - 1.27 mg/dL Final   04/01/2024 1.45 (H) 0.76 - 1.27 mg/dL Final      Calcium Calcium   Date Value Ref Range Status   04/03/2024 9.4 8.6 - 10.5 mg/dL Final   04/02/2024 9.1 8.6 - 10.5 mg/dL Final   04/01/2024 9.1 8.6 - 10.5 mg/dL Final      Glucose      No components found for: \"GLUCOSE.*\"      CT Chest Without Contrast Diagnostic   Final Result   1. Findings consistent with volume overload/third spacing including   interstitial pulmonary edema and small bilateral pleural effusions.   2. Dependent subpleural opacities in bilateral upper and lower lobes,   presumed atelectasis however correlate for clinical signs of developing   infection.   3. Mildly patulous debris-filled esophagus noted to the level of the   thoracic inlet. Debris also noted within the lower trachea and mainstem   bronchi. Patient is at high risk for aspiration. Recommend aspiration   precautions.       Recommend continuing annual low-dose chest CT lung screening on an   outpatient basis. Previous screening comparison 8/16/2022.                       Electronically Signed By-Denzel Oneil MD On:3/28/2024 4:14 PM          US Renal Bilateral   Final Result   1. Normal size kidneys without hydronephrosis.   2. Small subcentimeter cyst along the cortex of the left kidney, similar   to 2020.       Electronically Signed ByAnny Girard MD On:3/28/2024 1:50 PM          NM Lung Scan Perfusion Particulate   Final Result   1. No peripheral wedge-shaped defects to suggest presence of underlying   pulmonary embolism. Very low probability study per perfusion only PIOPED   2 criteria.           Electronically Signed ByAnny Girard MD On:3/28/2024 1:59 " PM          XR Chest 1 View   Final Result   Impression:   Increased diffuse interstitial opacities which may represent a component   of interstitial edema. No focal consolidation or pleural effusion.           Electronically Signed By-Kvng Beth MD On:3/27/2024 6:00 PM          XR Hip With or Without Pelvis 2 - 3 View Left   Final Result   Impression:   1.  Status post left hip arthroplasty.   2.  No immediate postoperative complication is identified.           Electronically Signed By-Rafael Pimentel MD On:3/27/2024 3:39 PM          FL Surgery Fluoro   Final Result      CT Pelvis Without Contrast   Final Result   Impression:   1.  Nondisplaced left femoral neck fracture   2.  Degenerative changes are noted involving the sacroiliac joints and   lower lumbar spine.   3.  Colonic diverticulosis.   4.  Enlarged prostate   5.  Left inguinal hernia containing fat.               Electronically Signed By-Aldo العراقي MD On:3/26/2024 6:15 PM          XR Chest 1 View   Final Result   No acute cardiopulmonary process identified.           Electronically Signed By-Osito Dutton MD On:3/26/2024 4:32 PM          XR Hip With or Without Pelvis 2 - 3 View Left   Final Result   1.  There is appearance to the left femur that is concerning for femoral   neck fracture with suggested foreshortening of the femoral neck.   Additional imaging may be helpful to better assess this.           Electronically Signed By-Aldo العراقي MD On:3/26/2024 4:34 PM

## 2024-04-04 VITALS
OXYGEN SATURATION: 98 % | BODY MASS INDEX: 23.46 KG/M2 | SYSTOLIC BLOOD PRESSURE: 123 MMHG | HEART RATE: 62 BPM | DIASTOLIC BLOOD PRESSURE: 49 MMHG | WEIGHT: 149.47 LBS | RESPIRATION RATE: 18 BRPM | TEMPERATURE: 98.6 F | HEIGHT: 67 IN

## 2024-04-04 LAB
ALBUMIN SERPL-MCNC: 2.9 G/DL (ref 3.5–5.2)
ALBUMIN/GLOB SERPL: 0.9 G/DL
ALP SERPL-CCNC: 82 U/L (ref 39–117)
ALT SERPL W P-5'-P-CCNC: 36 U/L (ref 1–41)
ANION GAP SERPL CALCULATED.3IONS-SCNC: 6.4 MMOL/L (ref 5–15)
AST SERPL-CCNC: 41 U/L (ref 1–40)
BASOPHILS # BLD AUTO: 0.11 10*3/MM3 (ref 0–0.2)
BASOPHILS NFR BLD AUTO: 0.8 % (ref 0–1.5)
BILIRUB SERPL-MCNC: 0.3 MG/DL (ref 0–1.2)
BUN SERPL-MCNC: 40 MG/DL (ref 8–23)
BUN/CREAT SERPL: 38.8 (ref 7–25)
CALCIUM SPEC-SCNC: 8.9 MG/DL (ref 8.6–10.5)
CHLORIDE SERPL-SCNC: 97 MMOL/L (ref 98–107)
CO2 SERPL-SCNC: 35.6 MMOL/L (ref 22–29)
CREAT SERPL-MCNC: 1.03 MG/DL (ref 0.76–1.27)
DEPRECATED RDW RBC AUTO: 52.6 FL (ref 37–54)
EGFRCR SERPLBLD CKD-EPI 2021: 73 ML/MIN/1.73
EOSINOPHIL # BLD AUTO: 0.52 10*3/MM3 (ref 0–0.4)
EOSINOPHIL NFR BLD AUTO: 3.8 % (ref 0.3–6.2)
ERYTHROCYTE [DISTWIDTH] IN BLOOD BY AUTOMATED COUNT: 13.7 % (ref 12.3–15.4)
GLOBULIN UR ELPH-MCNC: 3.3 GM/DL
GLUCOSE SERPL-MCNC: 141 MG/DL (ref 65–99)
HCT VFR BLD AUTO: 30.5 % (ref 37.5–51)
HGB BLD-MCNC: 9.1 G/DL (ref 13–17.7)
IMM GRANULOCYTES # BLD AUTO: 0.29 10*3/MM3 (ref 0–0.05)
IMM GRANULOCYTES NFR BLD AUTO: 2.1 % (ref 0–0.5)
LYMPHOCYTES # BLD AUTO: 1.78 10*3/MM3 (ref 0.7–3.1)
LYMPHOCYTES NFR BLD AUTO: 13.1 % (ref 19.6–45.3)
MAGNESIUM SERPL-MCNC: 2.2 MG/DL (ref 1.6–2.4)
MCH RBC QN AUTO: 31.2 PG (ref 26.6–33)
MCHC RBC AUTO-ENTMCNC: 29.8 G/DL (ref 31.5–35.7)
MCV RBC AUTO: 104.5 FL (ref 79–97)
MONOCYTES # BLD AUTO: 0.92 10*3/MM3 (ref 0.1–0.9)
MONOCYTES NFR BLD AUTO: 6.8 % (ref 5–12)
NEUTROPHILS NFR BLD AUTO: 73.4 % (ref 42.7–76)
NEUTROPHILS NFR BLD AUTO: 9.99 10*3/MM3 (ref 1.7–7)
NRBC BLD AUTO-RTO: 0 /100 WBC (ref 0–0.2)
PHOSPHATE SERPL-MCNC: 3.5 MG/DL (ref 2.5–4.5)
PLATELET # BLD AUTO: 274 10*3/MM3 (ref 140–450)
PMV BLD AUTO: 11.1 FL (ref 6–12)
POTASSIUM SERPL-SCNC: 3.7 MMOL/L (ref 3.5–5.2)
PROT SERPL-MCNC: 6.2 G/DL (ref 6–8.5)
RBC # BLD AUTO: 2.92 10*6/MM3 (ref 4.14–5.8)
SODIUM SERPL-SCNC: 139 MMOL/L (ref 136–145)
WBC NRBC COR # BLD AUTO: 13.61 10*3/MM3 (ref 3.4–10.8)

## 2024-04-04 PROCEDURE — 94761 N-INVAS EAR/PLS OXIMETRY MLT: CPT

## 2024-04-04 PROCEDURE — 80053 COMPREHEN METABOLIC PANEL: CPT | Performed by: PHYSICIAN ASSISTANT

## 2024-04-04 PROCEDURE — 97530 THERAPEUTIC ACTIVITIES: CPT

## 2024-04-04 PROCEDURE — 94664 DEMO&/EVAL PT USE INHALER: CPT

## 2024-04-04 PROCEDURE — 99239 HOSP IP/OBS DSCHRG MGMT >30: CPT | Performed by: INTERNAL MEDICINE

## 2024-04-04 PROCEDURE — 83735 ASSAY OF MAGNESIUM: CPT | Performed by: PHYSICIAN ASSISTANT

## 2024-04-04 PROCEDURE — 25010000002 ENOXAPARIN PER 10 MG: Performed by: INTERNAL MEDICINE

## 2024-04-04 PROCEDURE — 94799 UNLISTED PULMONARY SVC/PX: CPT

## 2024-04-04 PROCEDURE — 85025 COMPLETE CBC W/AUTO DIFF WBC: CPT | Performed by: PHYSICIAN ASSISTANT

## 2024-04-04 PROCEDURE — 84100 ASSAY OF PHOSPHORUS: CPT | Performed by: PHYSICIAN ASSISTANT

## 2024-04-04 RX ORDER — BUDESONIDE 0.5 MG/2ML
0.5 INHALANT ORAL
Start: 2024-04-04

## 2024-04-04 RX ORDER — ARFORMOTEROL TARTRATE 15 UG/2ML
15 SOLUTION RESPIRATORY (INHALATION)
Start: 2024-04-04

## 2024-04-04 RX ORDER — NICOTINE 21 MG/24HR
1 PATCH, TRANSDERMAL 24 HOURS TRANSDERMAL
Start: 2024-04-05

## 2024-04-04 RX ORDER — ACETAMINOPHEN 325 MG/1
325 TABLET ORAL EVERY 4 HOURS PRN
Start: 2024-04-04

## 2024-04-04 RX ORDER — IPRATROPIUM BROMIDE AND ALBUTEROL SULFATE 2.5; .5 MG/3ML; MG/3ML
3 SOLUTION RESPIRATORY (INHALATION) EVERY 4 HOURS PRN
Start: 2024-04-04

## 2024-04-04 RX ORDER — FAMOTIDINE 20 MG/1
20 TABLET, FILM COATED ORAL DAILY
Start: 2024-04-05

## 2024-04-04 RX ORDER — HYDROCODONE BITARTRATE AND ACETAMINOPHEN 7.5; 325 MG/1; MG/1
2 TABLET ORAL EVERY 4 HOURS PRN
Qty: 10 TABLET | Refills: 0 | Status: SHIPPED | OUTPATIENT
Start: 2024-04-04 | End: 2024-04-07

## 2024-04-04 RX ORDER — ENOXAPARIN SODIUM 100 MG/ML
30 INJECTION SUBCUTANEOUS DAILY
Start: 2024-04-05

## 2024-04-04 RX ADMIN — TAMSULOSIN HYDROCHLORIDE 0.4 MG: 0.4 CAPSULE ORAL at 09:36

## 2024-04-04 RX ADMIN — ENOXAPARIN SODIUM 30 MG: 100 INJECTION SUBCUTANEOUS at 09:36

## 2024-04-04 RX ADMIN — Medication 10 ML: at 09:38

## 2024-04-04 RX ADMIN — Medication 10 ML: at 09:37

## 2024-04-04 RX ADMIN — HYDROCODONE BITARTRATE AND ACETAMINOPHEN 2 TABLET: 7.5; 325 TABLET ORAL at 12:08

## 2024-04-04 RX ADMIN — NICOTINE 1 PATCH: 21 PATCH, EXTENDED RELEASE TRANSDERMAL at 09:36

## 2024-04-04 RX ADMIN — ARFORMOTEROL TARTRATE 15 MCG: 15 SOLUTION RESPIRATORY (INHALATION) at 09:26

## 2024-04-04 RX ADMIN — FAMOTIDINE 20 MG: 20 TABLET ORAL at 09:36

## 2024-04-04 RX ADMIN — BUDESONIDE 0.5 MG: 0.5 INHALANT ORAL at 09:26

## 2024-04-04 RX ADMIN — ALLOPURINOL 300 MG: 300 TABLET ORAL at 09:36

## 2024-04-04 NOTE — PLAN OF CARE
Goal Outcome Evaluation:  Plan of Care Reviewed With: spouse        Progress: improving          Patient to D/C to rehab facility. No new complaints at this time.

## 2024-04-04 NOTE — PLAN OF CARE
Goal Outcome Evaluation:      No significant change overnight. VSS. Family remains at bedside. Will continue POC.

## 2024-04-04 NOTE — DISCHARGE SUMMARY
[facility]  HOSPITALIST  DISCHARGE SUMMARY       Patient Name: Clark Layton  : 1940  MRN: [id number]  Primary Care Physician: Honorio Field MD    Date of Admission: 3/26/2024  Date of Discharge: 2024  Discharge Diagnoses   Status post mechanical fall   Left femoral neck fracture   Status post L hip hemiarthroplasty on  Dr. Carlson.  Hypoxic respiratory failure  Likely COPD given lifelong history of smoking   Volume overload, improved  Hypertension  Gout  Hyperlipidemia.  Alzheimer's dementia.  BPH  Hematuria due to Crowder catheter, resolved  Leukocytosis.  Likely reactive.  Appears resolved.  BÁRBARA, resolved   Contraction alkalosis from diuretics   Elevated D dimer in the postop patient.  Anemia.  Multifactorial.  Stable.   CODE STATUS: DNR  Hospital Course   Hospital Course:  Clark Layton is a pleasant 83 y.o. male with a history of Alzheimer's who fell off a stool at home resulting in left hip injury.  Imaging in the ED showed left femoral neck fracture.  He also had hypoxia noted.  He was admitted to the hospitalist service.  Orthopedic surgeon, Dr. Carlson, performed left hip hemiarthroplasty on 3/27/24.  Patient's hospital course was complicated by BÁRBARA.  Nephrology was consulted.  Diuretics were adjusted and eventually stopped.  Patient's creatinine improved and was down to 1.0.  Pulmonology was consulted for hypoxia and interstitial edema.  CT negative for PE.  Over time, his respiratory status remained stable but he did require supplemental O2 in the 2-3 L range.  He is a lifelong smoker and likely has underlying COPD and will potentially need home O2 set up prior to returning home.  After surgery, he made steady progress and was doing well with therapy; however, deemed a good candidate for more rehab.  He is now being discharged to [facility] for more rehab.  He is weightbearing as tolerated.  He will need to see orthopedic surgeon 2 weeks postop.  Staples  out 2 weeks postop as well.  Continued on supplemental O2 to keep sats 88-92% range.  Continue bronchodilators nebs.  Avoid hypotension and nephrotoxins.  He is to continue on Lovenox for postop DVT prophylaxis.  In addition to the above, he should follow-up with PCP and pulmonology after rehab.    Discharge Follow Up / Recommendations (labs, diagnostics, meds, etc):   As above  Future Appointments   Date Time Provider Department Center   4/11/2024  2:00 PM Marietta Price APRN [facility]     Consultants     Consults       Date and Time Order Name Status Description    3/28/2024  8:43 AM Inpatient Nephrology Consult Completed     3/28/2024  8:43 AM Inpatient Pulmonology Consult      3/26/2024  5:20 PM Inpatient Orthopedic Surgery Consult Completed     3/26/2024  5:13 PM Hospitalist (on-call MD unless specified)            On Day of Discharge   VS: Temp:  [97.9 °F (36.6 °C)-98.6 °F (37 °C)] 98.3 °F (36.8 °C)  Heart Rate:  [63-82] 70  Resp:  [18-20] 18  BP: (117-147)/(54-66) 117/54  Flow (L/min):  [2-4] 3  EXAM:  (refer to progress note from 4/4/2024)     Discharge Medications        ASK your doctor about these medications        Instructions Start Date   allopurinol 300 MG tablet  Commonly known as: ZYLOPRIM   300 mg, Oral, Daily      amLODIPine 10 MG tablet  Commonly known as: NORVASC   10 mg, Oral, Daily      hydroCHLOROthiazide 25 MG tablet   25 mg, Oral, Daily      tamsulosin 0.4 MG capsule 24 hr capsule  Commonly known as: FLOMAX   0.4 mg, Oral, Daily             Procedures   Left hip hemiarthroplasty    Imaging     Results for orders placed during the hospital encounter of 03/26/24    Duplex Venous Lower Extremity - Bilateral CV-READ    Interpretation Summary    Normal bilateral lower extremity venous duplex scan.    Incidental finding of bilateral SFA occlusions.    Results for orders placed during the hospital encounter of 03/26/24    Adult Transthoracic Echo Complete W/ Cont if Necessary Per  Protocol    Interpretation Summary    Left ventricular systolic function is normal. Left ventricular ejection fraction appears to be 61 - 65%.    Left ventricular wall thickness is consistent with mild concentric hypertrophy.    Left ventricular diastolic function is consistent with (grade I) impaired relaxation.    Mild aortic valve stenosis is present.    XR Chest PA & Lateral    Result Date: 4/3/2024  XR CHEST PA AND LATERAL-  Date of Exam: 4/3/2024 10:20 AM  Indication: hypoxia; S72.002A-Fracture of unspecified part of neck of left femur, initial encounter for closed fracture; W07.XXXA-Fall from chair, initial encounter; F03.90-Unspecified dementia, unspecified severity, without behavioral disturbance, psychotic disturbance, mood disturbance, and anxiety; Z74.09-Other reduced mobility; Z78.9-Other specified health status; R26.2-Difficulty in walking, not elsewhe  Comparison: March 27, 2024  Findings: The heart size is within normal limits. Bilateral pulmonary edema has improved. Probable small bilateral pleural effusions. No evidence of pneumothorax. There is mild airspace consolidation in the lung bases.      Impression:  1. Interval improvement in the bilateral pulmonary edema.  2. Probable small pleural effusions left greater than right. Airspace consolidation in the lung bases is likely atelectasis. Pneumonia is also possible.   Electronically Signed By-RICHA CANTU MD On:4/3/2024 10:35 AM      Duplex Venous Lower Extremity - Bilateral CV-READ    Result Date: 4/2/2024    Normal bilateral lower extremity venous duplex scan.   Incidental finding of bilateral SFA occlusions.     CT Chest Without Contrast Diagnostic    Result Date: 3/28/2024  EXAM:CT CHEST WO CONTRAST DIAGNOSTIC-  DATE OF EXAM: 3/28/2024 3:56 PM  INDICATION: Postop hypoxia; S72.002A-Fracture of unspecified part of neck of left femur, initial encounter for closed fracture; W07.XXXA-Fall from chair, initial encounter; F03.90-Unspecified dementia,  unspecified severity, without behavioral disturbance, psychotic disturbance, mood disturbance, and anxiety; Z74.09-Other reduced mobility; Z78.9-Other specified health status; R26.2-Difficulty in walking, not .  COMPARISON: Chest radiograph 3/27/2024, chest CT 8/16/2022.  TECHNIQUE: Serial and axial CT images of the chest were obtained. Reconstructions in the coronal and sagittal planes were performed. Automated exposure control and iterative reconstruction methods were used.  FINDINGS: Thyroid unremarkable. No supraclavicular adenopathy. Mildly patulous thoracic esophagus with debris noted up to the level of thoracic inlet. Possible small sliding hiatal hernia. Heart size within normal limits. Aortic valve leaflet calcifications. Moderate to severe three-vessel coronary atherosclerotic disease. No pericardial effusion. Aortic atherosclerotic disease without aneurysm. Normal caliber main pulmonary artery. No suspicious mediastinal or hilar adenopathy.  Debris noted in the lower trachea and mainstem bronchi. Small low-density bilateral pleural effusions. Mild bilateral smooth interlobar septal thickening. Underlying centrilobular emphysema. Dependent subpleural opacities in the bilateral upper and bilateral lower lobes. No pneumothorax. No overtly suspicious nodule.  Sequelae of prior granulomatous disease in the liver and spleen. Grossly unchanged left adrenal nodule since 2022 comparison suggesting benign adenomas. Degenerative related changes throughout the spine with probable superimposed diffuse idiopathic skeletal hyperostosis.      1. Findings consistent with volume overload/third spacing including interstitial pulmonary edema and small bilateral pleural effusions. 2. Dependent subpleural opacities in bilateral upper and lower lobes, presumed atelectasis however correlate for clinical signs of developing infection. 3. Mildly patulous debris-filled esophagus noted to the level of the thoracic inlet. Debris also  noted within the lower trachea and mainstem bronchi. Patient is at high risk for aspiration. Recommend aspiration precautions.  Recommend continuing annual low-dose chest CT lung screening on an outpatient basis. Previous screening comparison 8/16/2022.      Electronically Signed By-Denzel Oneil MD On:3/28/2024 4:14 PM      NM Lung Scan Perfusion Particulate    Result Date: 3/28/2024  DATE OF EXAM: 3/28/2024 1:30 PM  PROCEDURE: NM LUNG SCAN PERFUSION PARTICULATE-  INDICATIONS: Hypoxia postop.  Elevated D-dimer; S72.002A-Fracture of unspecified part of neck of left femur, initial encounter for closed fracture; W07.XXXA-Fall from chair, initial encounter; F03.90-Unspecified dementia, unspecified severity, without behavioral disturbance, psychotic disturbance, mood disturbance, and anxiety; Z74.09-Other reduced mobility; Z78.9-Other specified health status; R26.2-Difficul  COMPARISON: Chest radiograph dated 3/27/2024  TECHNIQUE: Patient received 4.5 mCi of technetium 99m MAA intravenously and perfusion images were acquired in multiple obliquities.  FINDINGS: Technologist reported difficulty in right arm motion which causes artifact on the right lateral projection. There is relatively homogeneous distribution of radiotracer on the anterior and posterior views without evidence of peripheral wedge-shaped photopenic defect. There is a stripe sign on the LPO view without evidence of peripheral defect. There is relative photopenia of the central aspect of the left lung. On concurrent radiograph, patient has diffuse increased interstitial markings.      1. No peripheral wedge-shaped defects to suggest presence of underlying pulmonary embolism. Very low probability study per perfusion only PIOPED 2 criteria.   Electronically Signed By-Solomon Girard MD On:3/28/2024 1:59 PM      US Renal Bilateral    Result Date: 3/28/2024  Examination: US RENAL BILATERAL-  Date of Exam: 3/28/2024 1:29 PM  Indication: Acute kidney  injury; S72.002A-Fracture of unspecified part of neck of left femur, initial encounter for closed fracture; W07.XXXA-Fall from chair, initial encounter; F03.90-Unspecified dementia, unspecified severity, without behavioral disturbance, psychotic disturbance, mood disturbance, and anxiety; Z74.09-Other reduced mobility; Z78.9-Other specified health status; R26.2-Difficulty in walking,.  Comparison: Renal ultrasound dated 7/14/2020  Technique: Grayscale and color Doppler ultrasound evaluation of the kidneys and urinary bladder was performed  Findings: The right kidney measures 9.5 x 4.5 x 4.4 cm. The left kidney measures 10.0 x 4.9 x 5.3 cm. There is no hydronephrosis. No abnormal renal calcifications. There is a small exophytic cyst along the mid to inferior aspect of the left kidney measuring 8 mm x 7 mm x 9 mm. This appears similar to the prior exam from 2020.  A Crowder catheter is present in the bladder is collapsed.      1. Normal size kidneys without hydronephrosis. 2. Small subcentimeter cyst along the cortex of the left kidney, similar to 2020.  Electronically Signed By-Solomon Girard MD On:3/28/2024 1:50 PM      Adult Transthoracic Echo Complete W/ Cont if Necessary Per Protocol    Result Date: 3/28/2024    Left ventricular systolic function is normal. Left ventricular ejection fraction appears to be 61 - 65%.   Left ventricular wall thickness is consistent with mild concentric hypertrophy.   Left ventricular diastolic function is consistent with (grade I) impaired relaxation.   Mild aortic valve stenosis is present.     XR Chest 1 View    Result Date: 3/27/2024  XR CHEST 1 VW-  Date of Exam: 3/27/2024 5:53 PM  Indication: Hypoxia; S72.002A-Fracture of unspecified part of neck of left femur, initial encounter for closed fracture; W07.XXXA-Fall from chair, initial encounter; F03.90-Unspecified dementia, unspecified severity, without behavioral disturbance, psychotic disturbance, mood disturbance, and anxiety   Comparison: 3/26/2024  Findings: Unchanged cardiomediastinal silhouette. There are increased interstitial opacities. No focal consolidation, pleural effusion, or pneumothorax. Osseous structures are unchanged.      Impression: Increased diffuse interstitial opacities which may represent a component of interstitial edema. No focal consolidation or pleural effusion.   Electronically Signed By-Kvng Beth MD On:3/27/2024 6:00 PM      XR Hip With or Without Pelvis 2 - 3 View Left    Result Date: 3/27/2024  XR HIP W OR WO PELVIS 2-3 VIEW LEFT-  Date of Exam: 3/27/2024 3:31 PM  Indication: Post-Op Hip Arthroplasty; S72.002A-Fracture of unspecified part of neck of left femur, initial encounter for closed fracture; W07.XXXA-Fall from chair, initial encounter; F03.90-Unspecified dementia, unspecified severity, without behavioral disturbance, psychotic disturbance, mood disturbance, and anxiety  Comparison: March 26, 2024  Findings: Status post hip hemiarthroplasty. Hardware components appear in satisfactory positions. Gas is present in the soft tissues, as expected. A skin staple line is present. No definite acute osseous abnormality.      Impression: 1.  Status post left hip arthroplasty. 2.  No immediate postoperative complication is identified.   Electronically Signed By-Rafael Pimentel MD On:3/27/2024 3:39 PM      FL Surgery Fluoro    Result Date: 3/27/2024  This procedure was auto-finalized with no dictation required.    CT Pelvis Without Contrast    Result Date: 3/26/2024  CT PELVIS WO CONTRAST-  Date of Exam: 3/26/2024 5:46 PM  Indication: Further evaluation of left hip fracture; S72.002A-Fracture of unspecified part of neck of left femur, initial encounter for closed fracture; W07.XXXA-Fall from chair, initial encounter; F03.90-Unspecified dementia, unspecified severity, without behavioral disturbance, psychotic disturbance, mood disturbance, and anxiety.  Comparison: X-ray left hip 3/26/2024  Technique: Axial CT  images were obtained of the pelvis without contrast administration.  Reconstructed coronal and sagittal images were also obtained. Automated exposure control and iterative construction methods were used.   Findings: There is a nondisplaced fracture of the left femoral neck. The intertrochanteric and subtrochanteric areas look like they're intact. An acute abnormality of the pelvic bones is not appreciated. Right hip seems intact.  Atherosclerotic vascular calcification is noted. There is sigmoid diverticulosis. Prostate is enlarged. There is a left inguinal hernia which appears to contain fat. There are degenerative changes involving the sacroiliac joints.      Impression: 1.  Nondisplaced left femoral neck fracture 2.  Degenerative changes are noted involving the sacroiliac joints and lower lumbar spine. 3.  Colonic diverticulosis. 4.  Enlarged prostate 5.  Left inguinal hernia containing fat.    Electronically Signed By-Aldo العراقي MD On:3/26/2024 6:15 PM      XR Hip With or Without Pelvis 2 - 3 View Left    Result Date: 3/26/2024  DATE OF EXAM: 3/26/2024 3:40 PM  PROCEDURE: XR HIP W OR WO PELVIS 2-3 VIEW LEFT-  INDICATIONS: Left hip pain after fall  COMPARISON: No Comparisons Available  TECHNIQUE: AP and Lateral views of the left hip and AP Pelvis were obtained.   FINDINGS: There is foreshortening of the left femoral neck concerning for fracture. The pelvic bones look intact. An acute abnormality of the right hip is not apparent.      1.  There is appearance to the left femur that is concerning for femoral neck fracture with suggested foreshortening of the femoral neck. Additional imaging may be helpful to better assess this.   Electronically Signed By-Aldo العراقي MD On:3/26/2024 4:34 PM      XR Chest 1 View    Result Date: 3/26/2024  XR CHEST 1 VW-  Date of Exam: 3/26/2024 3:40 PM  Indication: Preop.  Comparison Exams: CT chest from August 16, 2022  Technique: Single AP chest radiograph  FINDINGS: The lungs  are clear. The heart and mediastinal contours appear normal. The pulmonary vasculature appears normal. The osseous structures appear intact.      No acute cardiopulmonary process identified.   Electronically Signed By-Osito Dutton MD On:3/26/2024 4:32 PM      Discharge Details   Hospital Diet:     Diet Order   Procedures    Diet: Regular/House; Fluid Consistency: Thin (IDDSI 0)     CODE STATUS:    Code Status and Medical Interventions:   Ordered at: 03/27/24 1733     Medical Intervention Limits:    NO intubation (DNI)    NO cardioversion     Code Status (Patient has no pulse and is not breathing):    No CPR (Do Not Attempt to Resuscitate)     Medical Interventions (Patient has pulse or is breathing):    Limited Support       Discharge Disposition:   Pertinent  Labs   LAB RESULTS:      Lab 04/04/24  0306 04/03/24  0236 04/02/24  0215 03/30/24  0213 03/29/24  0837 03/29/24  0350 03/29/24  0337   WBC 13.61* 15.63* 17.42* 9.38  --   --  8.83   HEMOGLOBIN 9.1* 10.4* 10.6* 10.1*  --   --  10.0*   HEMATOCRIT 30.5* 33.8* 33.3* 33.4*  --  31.2* 30.9*   PLATELETS 274 261 239 160  --   --  150   NEUTROS ABS 9.99* 12.80* 14.65* 7.61*  --   --  6.49   IMMATURE GRANS (ABS) 0.29* 0.16* 0.13* 0.07*  --   --  0.07*   LYMPHS ABS 1.78 1.39 1.28 0.86  --   --  1.21   MONOS ABS 0.92* 0.99* 1.26* 0.76  --   --  0.67   EOS ABS 0.52* 0.19 0.03 0.03  --   --  0.32   .5* 102.1* 99.1* 100.9*  --   --  99.7*   LACTATE  --   --   --   --  0.9  --   --          Lab 04/04/24  0306 04/03/24  0236 04/02/24  0215 04/01/24  0222 03/31/24  0230   SODIUM 139 146* 142 142 140   POTASSIUM 3.7 4.4 3.1* 3.5 3.5   CHLORIDE 97* 100 94* 95* 92*   CO2 35.6* 39.2* 37.4* 38.3* 36.0*   ANION GAP 6.4 6.8 10.6 8.7 12.0   BUN 40* 54* 47* 45* 36*   CREATININE 1.03 1.44* 1.35* 1.45* 1.33*   EGFR 73.0 48.8* 52.7* 48.4* 53.7*   GLUCOSE 141* 140* 145* 132* 158*   CALCIUM 8.9 9.4 9.1 9.1 9.2   MAGNESIUM 2.2 2.5* 2.1  --  1.9   PHOSPHORUS 3.5 3.9 3.6 3.4 3.3   3.3         Lab 04/04/24  0306 04/03/24  0236 04/01/24  0222 03/31/24  0230 03/30/24  0213   TOTAL PROTEIN 6.2 6.7  --   --   --    ALBUMIN 2.9* 3.1* 3.2* 3.3* 3.2*   GLOBULIN 3.3 3.6  --   --   --    ALT (SGPT) 36 36  --   --   --    AST (SGOT) 41* 52*  --   --   --    BILIRUBIN 0.3 0.5  --   --   --    ALK PHOS 82 85  --   --   --                  Lab 04/02/24  0215 03/29/24  0339   IRON 12*  --    IRON SATURATION (TSAT) 6*  --    TIBC 198*  --    TRANSFERRIN 133*  --    VITAMIN B 12  --  1,074*         Brief Urine Lab Results  (Last result in the past 365 days)        Color   Clarity   Blood   Leuk Est   Nitrite   Protein   CREAT   Urine HCG        03/28/24 1013 Dark Yellow   Cloudy   Large (3+)   Trace   Negative   100 mg/dL (2+)                 Microbiology Results (last 10 days)       Procedure Component Value - Date/Time    Blood Culture - Blood, Arm, Right [[id number]]  (Normal) Collected: 03/29/24 0837    Lab Status: Final result Specimen: Blood from Arm, Right Updated: 04/03/24 0845     Blood Culture No growth at 5 days    Blood Culture - Blood, Arm, Right [[id number]]  (Normal) Collected: 03/29/24 0830    Lab Status: Final result Specimen: Blood from Arm, Right Updated: 04/03/24 0845     Blood Culture No growth at 5 days          Labs Pending at Discharge:   Time spent on Discharge including face to face service: > 30 minutes  Electronically signed by BABAK Shah, 04/04/24, 10:16 AM EDT.         Patient independently seen and evaluated, agree with assessment and plan, above documentation reflects plan put forth during bedside rounds.  More than 51% of the time of this patient encounter was performed by me.    Briefly patient is a 83-year-old male with a history of dementia who fell off a stool at home resulting in left hip injury.  Patient present to the hospital where he underwent surgical correction by orthopedic surgery.  Patient underwent left hemiarthroplasty on 3/27/2024.  Patient's course was  complicated by BÁRBARA, delirium.  Patient's mental status is improving.  Patient's renal function improved, patient was evaluated by PT/OT, recommending rehab placement.  Patient is excepted to rehab and discharged there today in stable condition.    GEN: No acute distress  HEENT: Moist mucous membranes  LUNGS: Equal chest rise bilaterally  CARDIAC: Regular rate and rhythm  NEURO: Moving all 4 extremities spontaneously  SKIN: No obvious breakdown    Time spent: Greater than 30 minutes      Electronically signed by Jamal Eaton MD, 04/04/24, 11:17 AM EDT.

## 2024-04-04 NOTE — SIGNIFICANT NOTE
04/04/24 1320   OTHER   Discipline occupational therapist   Rehab Time/Intention   Session Not Performed   (in process of hospital discharge)

## 2024-04-04 NOTE — THERAPY TREATMENT NOTE
Acute Care - Physical Therapy Treatment Note   Meryl     Patient Name: Clark Layton  : 1940  MRN: [id number]  Today's Date: 2024      Visit Dx:     ICD-10-CM ICD-9-CM   1. Left displaced femoral neck fracture  S72.002A 820.8   2. Fall from chair, initial encounter  W07.XXXA E884.2   3. Dementia without behavioral disturbance, psychotic disturbance, mood disturbance, or anxiety, unspecified dementia severity, unspecified dementia type  F03.90 294.20   4. Impaired mobility and ADLs  Z74.09 V49.89    Z78.9    5. Difficulty in walking  R26.2 719.7   6. Dysphagia, unspecified type  R13.10 787.20     Patient Active Problem List   Diagnosis    Nicotine dependence, cigarettes, with other nicotine-induced disorders    High cholesterol    Gout    Essential hypertension    Medication management    Benign prostatic hyperplasia    Left displaced femoral neck fracture    Hip fracture    History of dementia    Acute renal failure superimposed on chronic kidney disease    Iron deficiency anemia    Metabolic alkalosis     Past Medical History:   Diagnosis Date    Essential hypertension     Gout     High cholesterol 2018    Medication management 2018    Nicotine dependence, cigarettes, with other nicotine-induced disorders 2018     Past Surgical History:   Procedure Laterality Date    HIP BIPOLAR REPLACEMENT Left 3/27/2024    Procedure: HIP BIPOLAR ANTERIOR;  Surgeon: Bethany Carlson MD;  Location: [facility];  Service: Orthopedics;  Laterality: Left;     PT Assessment (Last 12 Hours)       PT Evaluation and Treatment       Row Name 24 0907          Physical Therapy Time and Intention    Subjective Information complains of;weakness  -RH     Document Type therapy note (daily note)  -RH     Mode of Treatment physical therapy;individual therapy  -RH     Patient Effort fair  -RH     Symptoms Noted During/After Treatment nausea  -RH     Comment Pt confused today requiring constant verbal and  tactile cues for all activity.  -       Row Name 04/04/24 0907          Pain Scale: FACES Pre/Post-Treatment    Pain: FACES Scale, Pretreatment 0-->no hurt  -RH     Posttreatment Pain Rating 0-->no hurt  -RH       Row Name 04/04/24 0907          Range of Motion (ROM)    Range of Motion ROM is WFL;bilateral upper extremities  -[facility] Name 04/04/24 0907          Mobility    Extremity Weight-bearing Status left lower extremity  -     Left Lower Extremity (Weight-bearing Status) weight-bearing as tolerated (WBAT)  -       Row Name 04/04/24 0907          Bed Mobility    Bed Mobility supine-sit  -RH     All Activities, Jackson (Bed Mobility) moderate assist (50% patient effort)  -RH     Scooting/Bridging Jackson (Bed Mobility) moderate assist (50% patient effort)  -RH     Supine-Sit Jackson (Bed Mobility) moderate assist (50% patient effort)  -     Bed Mobility, Safety Issues cognitive deficits limit understanding;decreased use of legs for bridging/pushing  -     Assistive Device (Bed Mobility) bed rails;draw sheet  -     Comment, (Bed Mobility) Pt maintains sitting at side of bed with CGA.  -       Row Name 04/04/24 0907          Transfers    Transfers stand pivot/stand step transfer  -     Maintains Weight-bearing Status (Transfers) able to maintain  -       Row Name 04/04/24 0907          Stand Pivot/Stand Step Transfer    Stand Pivot/Stand Step Jackson (Transfers) moderate assist (50% patient effort);maximum assist (25% patient effort)  -     Assistive Device (Stand Pivot Stand Step Transfer) walker, front-wheeled  -     Comment, (Stand Pivot Transfer) Pt requires constant verbal and tactile cues for all activity due to confusion.  Pt with minimal shuffling steps to assist with pivot transfer.  -       Row Name 04/04/24 0907          Toilet Transfer    Type (Toilet Transfer) sit-stand;stand-sit  -RH     Jackson Level (Toilet Transfer) moderate assist (50% patient  effort);maximum assist (25% patient effort)  -     Assistive Device (Toilet Transfer) walker, front-wheeled  -RH       Row Name 04/04/24 0907          Balance    Dynamic Standing Balance moderate assist;maximum assist  -     Position/Device Used, Standing Balance walker, front-wheeled  -RH       Row Name             Wound 03/27/24 1400 Left lateral thigh Incision    Wound - Properties Group Placement Date: 03/27/24  -AR Placement Time: 1400  -AR Side: Left  -AR Orientation: lateral  -AR Location: thigh  -AR Primary Wound Type: Incision  -AR    Retired Wound - Properties Group Placement Date: 03/27/24  -AR Placement Time: 1400  -AR Side: Left  -AR Orientation: lateral  -AR Location: thigh  -AR Primary Wound Type: Incision  -AR    Retired Wound - Properties Group Date first assessed: 03/27/24  -AR Time first assessed: 1400  -AR Side: Left  -AR Location: thigh  -AR Primary Wound Type: Incision  -AR      Row Name             Wound 03/31/24 0935 Left midline gluteal Pressure Injury    Wound - Properties Group Placement Date: 03/31/24  - Placement Time: 0935  -MH Present on Original Admission: N  -MH Side: Left  -MH Orientation: midline  -MH Location: gluteal  -MH Primary Wound Type: Pressure inj  -MH    Retired Wound - Properties Group Placement Date: 03/31/24  - Placement Time: 0935  -MH Present on Original Admission: N  -MH Side: Left  -MH Orientation: midline  -MH Location: gluteal  -MH Primary Wound Type: Pressure inj  -MH    Retired Wound - Properties Group Date first assessed: 03/31/24  - Time first assessed: 0935  -MH Present on Original Admission: N  -MH Side: Left  -MH Location: gluteal  -MH Primary Wound Type: Pressure inj  -MH      Row Name 04/04/24 0907          Vital Signs    O2 Delivery Intra Treatment room air  -RH       Row Name 04/04/24 0907          Positioning and Restraints    In Chair call light within reach;encouraged to call for assist;exit alarm on;with family/caregiver;reclined  -        Row Name 04/04/24 0907          Progress Summary (PT)    Progress Toward Functional Goals (PT) progress toward functional goals is fair  -RH               User Key  (r) = Recorded By, (t) = Taken By, (c) = Cosigned By      Initials Name Provider Type    Uma Whitehead RN CSA Registered Nurse    RH Tony Lieberman PTA Physical Therapist Assistant    Nancy Ayala RN Registered Nurse                      PT Recommendation and Plan     Progress Summary (PT)  Progress Toward Functional Goals (PT): progress toward functional goals is fair   Outcome Measures       Row Name 04/04/24 0900 04/03/24 1100 04/02/24 1250       How much help from another person do you currently need...    Turning from your back to your side while in flat bed without using bedrails? 2  -RH 2  -RH 2  -RH    Moving from lying on back to sitting on the side of a flat bed without bedrails? 2  -RH 2  -RH 2  -RH    Moving to and from a bed to a chair (including a wheelchair)? 2  -RH 2  -RH 2  -RH    Standing up from a chair using your arms (e.g., wheelchair, bedside chair)? 2  -RH 2  -RH 2  -RH    Climbing 3-5 steps with a railing? 1  -RH 1  -RH 1  -RH    To walk in hospital room? 2  -RH 2  -RH 1  -RH    AM-PAC 6 Clicks Score (PT) 11  -RH 11  -RH 10  -RH    Highest Level of Mobility Goal 4 --> Transfer to chair/commode  -RH 4 --> Transfer to chair/commode  -RH 4 --> Transfer to chair/commode  -RH      Row Name 04/01/24 1600             How much help from another person do you currently need...    Turning from your back to your side while in flat bed without using bedrails? 2  -VK      Moving from lying on back to sitting on the side of a flat bed without bedrails? 2  -VK      Moving to and from a bed to a chair (including a wheelchair)? 2  -VK      Standing up from a chair using your arms (e.g., wheelchair, bedside chair)? 2  -VK      Climbing 3-5 steps with a railing? 1  -VK      To walk in hospital room? 1  -VK      AM-PAC 6 Clicks Score (PT)  10  -VK      Highest Level of Mobility Goal 4 --> Transfer to chair/commode  -VK                User Key  (r) = Recorded By, (t) = Taken By, (c) = Cosigned By      Initials Name Provider Type    RH Tony Lieberman PTA Physical Therapist Assistant    Saira Lopez PTA Physical Therapist Assistant                     Time Calculation:    PT Charges       Row Name 04/04/24 0907             Time Calculation    PT Received On 04/04/24  -RH         Timed Charges    82101 - PT Therapeutic Activity Minutes 24  -RH         Total Minutes    Timed Charges Total Minutes 24  -RH       Total Minutes 24  -RH                User Key  (r) = Recorded By, (t) = Taken By, (c) = Cosigned By      Initials Name Provider Type     Tony Lieberman PTA Physical Therapist Assistant                  Therapy Charges for Today       Code Description Service Date Service Provider Modifiers Qty    48952042471 HC PT THER PROC EA 15 MIN 4/3/2024 Tony Lieberman PTA GP 2    36656630178 HC PT THERAPEUTIC ACT EA 15 MIN 4/3/2024 Tony Lieberman PTA GP 1    20346000713 HC PT THERAPEUTIC ACT EA 15 MIN 4/4/2024 Tony Lieberman PTA GP 2            PT G-Codes  Outcome Measure Options: AM-PAC 6 Clicks Basic Mobility (PT)  AM-PAC 6 Clicks Score (PT): 11  AM-PAC 6 Clicks Score (OT): 12    Tony Lieberman PTA  4/4/2024

## 2024-04-04 NOTE — PROGRESS NOTES
[facility]     Progress Note    Patient Name: Clark Layton  : 1940  MRN: [id number]  Primary Care Physician:  Honorio Field MD  Date of admission: 3/26/2024    Subjective patient was fully awake alert responsive interactive not in any acute distress.  He may be going to [facility] nursing home and rehab today.  Renal function is very stable    Review of Systems  Constitutional:        Weakness tiredness fatigue  Eyes:                       No blurry vision, eye discharge, eye irritation, eye pain  HEENT:                   No acute hair loss, earache and discharge, nasal congestion or discharge, sore throat, postnasal drip  Respiratory:           No shortness of breath coughing sputum production wheezing hemoptysis pleuritic chest pain  Cardiovascular:     No chest pain, orthopnea, PND, dizziness, palpitation, lower extremity edema  Gastrointestinal:   No nausea vomiting diarrhea abdominal pain constipation  Genitourinary:       No urinary incontinence, hesitancy, frequency, urgency, dysuria  Hematologic:         No bruising, bleeding, pallor, lymphadenopathy  Endocrine:            No coldness, hot flashes, polyuria, abnormal hair growth  Musculoskeletal:    No body pains, aches, arthritic pains, muscle pain ,muscle wasting  Psychiatric:          No low or high mood, anxiety, hallucinations, delusions  Skin.                      No rash, ulcers, bruising, itching  Neurological:        No confusion, headache, focal weakness, numbness, dysphasia    Objective   Objective     Vitals:   Temp:  [97.9 °F (36.6 °C)-98.6 °F (37 °C)] 98.3 °F (36.8 °C)  Heart Rate:  [63-82] 63  Resp:  [18-20] 18  BP: (117-147)/(54-66) 117/54  Flow (L/min):  [2-4] 4  Physical Exam    Constitutional: Awake, alert responsive, conversant, no obvious distress              Psychiatric:  Appropriate affect, cooperative   Neurologic:  Awake alert ,oriented x 3, strength symmetric in all extremities, Cranial Nerves grossly intact  to confrontation, speech clear   Eyes:   PERRLA, sclerae anicteric, no conjunctival injection   HEENT:  Moist mucous membranes, no nasal or eye discharge, no throat congestion   Neck:   Supple, no thyromegaly, no lymphadenopathy, trachea midline, no elevated JVD   Respiratory:  Clear to auscultation bilaterally, nonlabored respirations    Cardiovascular: RRR, no murmurs, rubs, or gallops, palpable pedal pulses bilaterally, No bilateral ankle edema   Gastrointestinal: Positive bowel sounds, soft, nontender, nondistended, no organomegaly   Musculoskeletal:  No clubbing or cyanosis to extremities,muscle wasting, joint swelling, muscle weakness             Skin:                      No rashes, bruising, skin ulcers, petechiae or ecchymosis    Result Review    Result Review:  I have personally reviewed the results from the time of this admission to 4/4/2024 08:10 EDT and agree with these findings:  []  Laboratory  []  Microbiology  []  Radiology  []  EKG/Telemetry   []  Cardiology/Vascular   []  Pathology  []  Old records  []  Other:    Results from last 7 days   Lab Units 04/04/24  0306 04/03/24  0236 04/02/24  0215 03/30/24  0213 03/29/24  0337   WBC 10*3/mm3 13.61* 15.63* 17.42* 9.38 8.83   HEMOGLOBIN g/dL 9.1* 10.4* 10.6* 10.1* 10.0*   PLATELETS 10*3/mm3 274 261 239 160 150     Results from last 7 days   Lab Units 04/04/24  0306 04/03/24  0236 04/02/24  0215 04/01/24  0222 03/31/24  0230 03/30/24  0213 03/29/24  0337   SODIUM mmol/L 139 146* 142 142 140 138 138   POTASSIUM mmol/L 3.7 4.4 3.1* 3.5 3.5 3.6 3.9   CHLORIDE mmol/L 97* 100 94* 95* 92* 94* 97*   CO2 mmol/L 35.6* 39.2* 37.4* 38.3* 36.0* 37.5* 34.4*   BUN mg/dL 40* 54* 47* 45* 36* 32* 35*   CREATININE mg/dL 1.03 1.44* 1.35* 1.45* 1.33* 1.54* 1.54*   GLUCOSE mg/dL 141* 140* 145* 132* 158* 132* 101*       Assessment & Plan   Assessment / Plan       Active Hospital Problems:    Active Hospital Problems    Diagnosis  POA    **Hip fracture [S72.009A]  Yes    Acute  renal failure superimposed on chronic kidney disease [N17.9, N18.9]  Unknown     Baseline creatinine around 1.4      Iron deficiency anemia [D50.9]  Unknown     Give IV iron      Metabolic alkalosis [E87.3]  Unknown     From diuretics      History of dementia [Z86.59]  Not Applicable    Gout [M10.9]  Yes    Essential hypertension [I10]  Yes    Nicotine dependence, cigarettes, with other nicotine-induced disorders [F17.218]  Yes       Plan:   Patient can be discharged from renal perspective  Continue to hold diuretics for metabolic alkalosis at this time and it is improving    Electronically signed by Milli Nelson MD, 04/04/24, 8:10 AM EDT.

## 2024-04-04 NOTE — PROGRESS NOTES
[facility]   Hospitalist Progress Note  Date: 2024  Patient Name: Clark Layton  : 1940  MRN: [id number]  Date of admission: 3/26/2024      Subjective   Subjective     Chief Complaint: Fall at home and left hip pain    Summary:   Clark Layton is a 83 y.o. male with past medical history of hypertension, Alzheimer's hyperlipidemia, BPH, nicotine dependence, and GERD presented to the ED after a fall resulting in left hip injury.  Patient has advanced dementia so history was given by wife at bedside.  As per wife he was sitting on a stool on the kitchen island when he fell asleep and fell to the floor.  Patient with severe pain afterwards and was then transferred to the ED for further evaluation.  In the ED patient was hypoxic on arrival with remaining vitals being within normal limits.  X-ray of the hip showed a left femoral neck fracture with CT confirming nondisplaced left femoral neck fracture.  Orthopedic surgery was consulted and performed successful hip anterior arthroplasty.  Postoperative course complicated by hypoxemia, BÁRBARA.  Pulmonology and nephrology were consulted.  He was diuresed with improvement.  He is having some difficulty working with therapy as appetite has been poor.  However, he is improving and plan is to discharge to rehab when medically stable.    Interval Followup:    2024  Son at bedside  Up in chair.  Clinically improving.  Still on 2-3 L.  Hip pain reasonably controlled.  Remains medically stable.  Mood calm and appropriate.  Discussed with family and patient.  DC to rehab today.    Objective   Objective     Vitals:   Temp:  [97.9 °F (36.6 °C)-98.6 °F (37 °C)] 98.3 °F (36.8 °C)  Heart Rate:  [63-82] 70  Resp:  [18-20] 18  BP: (117-147)/(54-66) 117/54  Flow (L/min):  [2-4] 3  Physical Exam   GEN: No acute distress.  Alert and conversational.  HEENT: Moist mucous membranes  LUNGS: Equal chest rise bilaterally.  No wheeze.  Diminished left base.  CARDIAC: Regular rate and  rhythm  NEURO: Moving all 4 extremities spontaneously.  Left thigh dressings intact.  SKIN: No obvious breakdown    Result Review    I have personally reviewed the results for the past 24 hours and agree with these findings:  [x]  Laboratory personally reviewed BMP, magnesium, phosphorus  CBC          4/2/2024    02:15 4/3/2024    02:36 4/4/2024    03:06   CBC   WBC 17.42  15.63  13.61    RBC 3.36  3.31  2.92    Hemoglobin 10.6  10.4  9.1    Hematocrit 33.3  33.8  30.5    MCV 99.1  102.1  104.5    MCH 31.5  31.4  31.2    MCHC 31.8  30.8  29.8    RDW 14.2  14.0  13.7    Platelets 239  261  274      CMP          4/2/2024    02:15 4/3/2024    02:36 4/4/2024    03:06   CMP   Glucose 145  140  141    BUN 47  54  40    Creatinine 1.35  1.44  1.03    EGFR 52.7  48.8  73.0    Sodium 142  146  139    Potassium 3.1  4.4  3.7    Chloride 94  100  97    Calcium 9.1  9.4  8.9    Total Protein  6.7  6.2    Albumin  3.1  2.9    Globulin  3.6  3.3    Total Bilirubin  0.5  0.3    Alkaline Phosphatase  85  82    AST (SGOT)  52  41    ALT (SGPT)  36  36    Albumin/Globulin Ratio  0.9  0.9    BUN/Creatinine Ratio 34.8  37.5  38.8    Anion Gap 10.6  6.8  6.4      []  Microbiology  [x]  Radiology  [x]  EKG/Telemetry   []  Cardiology/Vascular   []  Pathology  [x]  Old records  [x]  Other: Medications    Assessment & Plan   Assessment / Plan     ASSESSMENT:  Status post mechanical fall   Left femoral neck fracture   Status post L hip hemiarthroplasty on March 27 Dr. Carlson.  Hypoxic respiratory failure  Likely COPD given lifelong history of smoking   Volume overload, improved  Hypertension  Gout  Hyperlipidemia.  Alzheimer's dementia.  BPH  Hematuria due to Crowder catheter, resolved  Leukocytosis.  Likely reactive.  Appears resolved.  BÁRBARA, resolved   Contraction alkalosis from diuretics   Elevated D dimer in the postop patient.  Anemia.  Multifactorial.  Stable.   CODE STATUS: DNR     PLAN:  Disposition: [facility]  today  Encourage incentive spirometer.    Keep sats 90-92% range   Nephrology consulted.  Continue to hold Lasix.  Continue with Pulmicort and Brovana twice daily, DuoNebs as needed  Will need walk test prior to returning home from rehab  Oral and IV narcotics for pain control on as-needed basis.  Completed course of antibiotics for presumed pneumonia  Delirium precautions  Continue home Flomax and allopurinol  Continue scheduled bowel regimen.  Lovenox for DVT prophylaxis postoperatively  Staples out 2 weeks postop.  Orthopedic follow-up 2 weeks postop.      DVT prophylaxis:  Medical and mechanical DVT prophylaxis orders are present.    CODE STATUS:   Medical Intervention Limits: NO intubation (DNI); NO cardioversion  Code Status (Patient has no pulse and is not breathing): No CPR (Do Not Attempt to Resuscitate)  Medical Interventions (Patient has pulse or is breathing): Limited Support

## 2024-04-05 ENCOUNTER — NURSING HOME (OUTPATIENT)
Dept: INTERNAL MEDICINE | Age: 84
End: 2024-04-05
Payer: MEDICARE

## 2024-04-05 DIAGNOSIS — N40.0 BENIGN PROSTATIC HYPERPLASIA, UNSPECIFIED WHETHER LOWER URINARY TRACT SYMPTOMS PRESENT: ICD-10-CM

## 2024-04-05 DIAGNOSIS — N17.9 ACUTE RENAL FAILURE SUPERIMPOSED ON CHRONIC KIDNEY DISEASE, UNSPECIFIED ACUTE RENAL FAILURE TYPE, UNSPECIFIED CKD STAGE: ICD-10-CM

## 2024-04-05 DIAGNOSIS — F02.C18 SEVERE ALZHEIMER'S DEMENTIA WITH OTHER BEHAVIORAL DISTURBANCE, UNSPECIFIED TIMING OF DEMENTIA ONSET: Primary | ICD-10-CM

## 2024-04-05 DIAGNOSIS — F17.218 NICOTINE DEPENDENCE, CIGARETTES, WITH OTHER NICOTINE-INDUCED DISORDERS: ICD-10-CM

## 2024-04-05 DIAGNOSIS — I10 ESSENTIAL HYPERTENSION: ICD-10-CM

## 2024-04-05 DIAGNOSIS — N18.9 ACUTE RENAL FAILURE SUPERIMPOSED ON CHRONIC KIDNEY DISEASE, UNSPECIFIED ACUTE RENAL FAILURE TYPE, UNSPECIFIED CKD STAGE: ICD-10-CM

## 2024-04-05 DIAGNOSIS — I10 HYPERTENSION, ESSENTIAL: ICD-10-CM

## 2024-04-05 DIAGNOSIS — G30.9 SEVERE ALZHEIMER'S DEMENTIA WITH OTHER BEHAVIORAL DISTURBANCE, UNSPECIFIED TIMING OF DEMENTIA ONSET: Primary | ICD-10-CM

## 2024-04-05 DIAGNOSIS — D50.9 IRON DEFICIENCY ANEMIA, UNSPECIFIED IRON DEFICIENCY ANEMIA TYPE: ICD-10-CM

## 2024-04-05 DIAGNOSIS — M1A.9XX1 CHRONIC GOUT WITH TOPHUS, UNSPECIFIED CAUSE, UNSPECIFIED SITE: ICD-10-CM

## 2024-04-05 DIAGNOSIS — S72.002S CLOSED FRACTURE OF LEFT HIP, SEQUELA: ICD-10-CM

## 2024-04-05 PROBLEM — F02.C0 SEVERE ALZHEIMER'S DEMENTIA: Status: ACTIVE | Noted: 2024-04-05

## 2024-04-05 PROCEDURE — 99306 1ST NF CARE HIGH MDM 50: CPT | Performed by: INTERNAL MEDICINE

## 2024-04-05 RX ORDER — HYDROCODONE BITARTRATE AND ACETAMINOPHEN 7.5; 325 MG/1; MG/1
1 TABLET ORAL EVERY 6 HOURS PRN
Qty: 120 TABLET | Refills: 0 | Status: SHIPPED | OUTPATIENT
Start: 2024-04-05

## 2024-04-05 NOTE — PROGRESS NOTES
Nursing Home History and Physical Note      Ruddy Smith MD  [x]  [address], [address]  [place] [address]  Phone: [phone]  Fax: [phone]     PATIENT NAME: Clark Layton                                                                          YOB: 1940            DATE OF SERVICE: 04/05/2024  FACILITY:   [] [facility]   [x] [facility]  [] [facility]  [] Other      HISTORY OF PRESENT ILLNESS: Patient is being seen as a new admission, he came from our hospital he was there March 26, 2024 through April 4, 2024 and he came here yesterday, he is out the lobby is very confused he does not follow commands well, he is awake and alert he is rambling with his speech, he is supposed to be wearing 3 L of oxygen but had it off and hanging on the wheelchair when I came to examine him, he does not follow commands well, I reviewed his discharge summary,    He had a mechanical fall with a left femoral neck fracture and underwent a left hip hemiarthroplasty by Dr. Carlson on March 27, 2024.  It appears that he developed hypoxic respiratory failure given his underlying COPD.  He also has underlying Alzheimer's dementia.  He developed acute kidney injury in the hospital and nephrology was consulted, his diuretics were eventually stopped and his creatinine improved, because of his hypoxia and underlying COPD pulmonary was consulted due to interstitial edema and a CT scan was negative for pulmonary embolus, he continued to require oxygen at 2 to 3 L, he is currently weightbearing as tolerated they recommended continuing oxygen and nebulizer treatments, and avoiding nephrotoxic medications and low blood pressures, he is on DVT prophylaxis with Lovenox.      PAST MEDICAL & SURGICAL HISTORY:   Past Medical History:   Diagnosis Date    Essential hypertension     Gout     High cholesterol 12/19/2018    Medication management  12/19/2018    Nicotine dependence, cigarettes, with other nicotine-induced disorders 09/19/2018   Dementia Alzheimer's type  COPD  Past Surgical History:   Procedure Laterality Date    HIP BIPOLAR REPLACEMENT Left 3/27/2024    Procedure: HIP BIPOLAR ANTERIOR;  Surgeon: Bethany Carlson MD;  Location: [facility] OR;  Service: Orthopedics;  Laterality: Left;          MEDICATIONS:  I have reviewed and reconciled the patients medication list in the patients chart at the skilled nursing facility today.      ALLERGIES:  No Known Allergies      SOCIAL HISTORY:  Social History     Socioeconomic History    Marital status:    Tobacco Use    Smoking status: Every Day     Current packs/day: 1.00     Average packs/day: 1 pack/day for 62.3 years (62.3 ttl pk-yrs)     Types: Cigarettes     Start date: 1962    Smokeless tobacco: Never    Tobacco comments:     pt declines LDCT scan, states feels fine does not want   Vaping Use    Vaping status: Never Used   Substance and Sexual Activity    Alcohol use: Not Currently     Alcohol/week: 7.0 standard drinks of alcohol     Types: 7 Shots of liquor per week     Comment: Current some day, wife states that he is not drinking she puts water in the vodka container but he is not aware of this.    Drug use: Never    Sexual activity: Defer       FAMILY HISTORY:  Family History   Problem Relation Age of Onset    Heart disease Father     Brain cancer Brother          PHYSICAL EXAMINATION:   VITAL SIGNS: Pulse 80 sat 91% on 2 to 3 L of oxygen respiratory rates around 18-20 blood pressure 132/66 weight 219.4 pounds and temperature is 97.4    PHYSICAL EXAM: His neck is supple he is a frail elderly gentleman he is in a wheelchair he is confused he is moving around he is moving all 4 extremities to command, he has no edema in either lower leg, his abdomen is soft his neck is supple his throat shows dry mucosa he looks disheveled, his skin is showing poor turgor throughout, his lungs show  diminished breath sounds, cardiac exam regular rhythm with a 1/6 systolic murmur, he can move his legs to command he will raise his arms up some, and he is very confused      RECORDS REVIEW:   Orders Reviewed.  Labs Reviewed.      ICD-10-CM ICD-9-CM   1. Severe Alzheimer's dementia with other behavioral disturbance, unspecified timing of dementia onset  G30.9 331.0    F02.C18 294.11   2. Essential hypertension  I10 401.9   3. Benign prostatic hyperplasia, unspecified whether lower urinary tract symptoms present  N40.0 600.00   4. Closed fracture of left hip, sequela  S72.002S 905.3   5. Nicotine dependence, cigarettes, with other nicotine-induced disorders  F17.218 292.89   6. Chronic gout with tophus, unspecified cause, unspecified site  M1A.9XX1 274.03   7. Acute renal failure superimposed on chronic kidney disease, unspecified acute renal failure type, unspecified CKD stage  N17.9 584.9    N18.9 585.9   8. Hypertension, essential  I10 401.9   9. Iron deficiency anemia, unspecified iron deficiency anemia type  D50.9 280.9       ASSESSMENT/PLAN    Diagnoses and all orders for this visit:    1. Severe Alzheimer's dementia with other behavioral disturbance, unspecified timing of dementia onset (Primary)  -     HYDROcodone-acetaminophen (NORCO) 7.5-325 MG per tablet; Take 1 tablet by mouth Every 6 (Six) Hours As Needed for Moderate Pain.  Dispense: 120 tablet; Refill: 0    2. Essential hypertension  -     HYDROcodone-acetaminophen (NORCO) 7.5-325 MG per tablet; Take 1 tablet by mouth Every 6 (Six) Hours As Needed for Moderate Pain.  Dispense: 120 tablet; Refill: 0    3. Benign prostatic hyperplasia, unspecified whether lower urinary tract symptoms present  -     HYDROcodone-acetaminophen (NORCO) 7.5-325 MG per tablet; Take 1 tablet by mouth Every 6 (Six) Hours As Needed for Moderate Pain.  Dispense: 120 tablet; Refill: 0    4. Closed fracture of left hip, sequela  -     HYDROcodone-acetaminophen (NORCO) 7.5-325 MG per  tablet; Take 1 tablet by mouth Every 6 (Six) Hours As Needed for Moderate Pain.  Dispense: 120 tablet; Refill: 0    5. Nicotine dependence, cigarettes, with other nicotine-induced disorders  -     HYDROcodone-acetaminophen (NORCO) 7.5-325 MG per tablet; Take 1 tablet by mouth Every 6 (Six) Hours As Needed for Moderate Pain.  Dispense: 120 tablet; Refill: 0    6. Chronic gout with tophus, unspecified cause, unspecified site  -     HYDROcodone-acetaminophen (NORCO) 7.5-325 MG per tablet; Take 1 tablet by mouth Every 6 (Six) Hours As Needed for Moderate Pain.  Dispense: 120 tablet; Refill: 0    7. Acute renal failure superimposed on chronic kidney disease, unspecified acute renal failure type, unspecified CKD stage  -     HYDROcodone-acetaminophen (NORCO) 7.5-325 MG per tablet; Take 1 tablet by mouth Every 6 (Six) Hours As Needed for Moderate Pain.  Dispense: 120 tablet; Refill: 0    8. Hypertension, essential  -     HYDROcodone-acetaminophen (NORCO) 7.5-325 MG per tablet; Take 1 tablet by mouth Every 6 (Six) Hours As Needed for Moderate Pain.  Dispense: 120 tablet; Refill: 0    9. Iron deficiency anemia, unspecified iron deficiency anemia type  -     HYDROcodone-acetaminophen (NORCO) 7.5-325 MG per tablet; Take 1 tablet by mouth Every 6 (Six) Hours As Needed for Moderate Pain.  Dispense: 120 tablet; Refill: 0    Fall, left hip fracture status post left hip hemiarthroplasty March 27, 2024 Dr. Carlson, will need follow-up with orthopedic surgery soon, will do follow-up lab work Monday or Tuesday, has as needed hydrocodone 7.5-325 mg 1 tablet every 6 hours as needed for pain    Acute kidney injury in the hospital improved at time of discharge will follow-up labs, avoiding nephrotoxic medication including low blood pressures    Anemia possibly postop and/or multifactorial    Acute hypoxic respiratory failure on top of underlying COPD with heavy tobacco use in the past, will continue monitoring with nebulizer treatments to  continue along with oxygen, continues Brovana nebs twice a day, Pulmicort neb 0.5 mg twice a day,, DuoNebs 4 times a day    Dementia Alzheimer's type moderate to severely advanced, continue supportive care, patient already appears frail somewhat malnourished, will need to discuss care plan with family and goals of care for the foreseeable future.    Gout, continues allopurinol 300 mg daily    Nicotine dependence, has a nicotine patch and will continue 21 milligrams per day    GI prophylaxis continues Pepcid 20 mg daily    Hypertension    BPH, continues Flomax 0.4 nightly    He is a DNR,    Follow-up lab work, today and again in 10 days,      Ruddy Smith MD

## 2024-04-11 ENCOUNTER — OFFICE VISIT (OUTPATIENT)
Dept: ORTHOPEDIC SURGERY | Facility: CLINIC | Age: 84
End: 2024-04-11
Payer: MEDICARE

## 2024-04-11 VITALS
SYSTOLIC BLOOD PRESSURE: 117 MMHG | BODY MASS INDEX: 21.19 KG/M2 | WEIGHT: 135 LBS | HEART RATE: 69 BPM | HEIGHT: 67 IN | OXYGEN SATURATION: 94 % | DIASTOLIC BLOOD PRESSURE: 59 MMHG

## 2024-04-11 DIAGNOSIS — Z47.89 AFTERCARE FOLLOWING SURGERY OF THE MUSCULOSKELETAL SYSTEM: Primary | ICD-10-CM

## 2024-04-11 RX ORDER — HYDROCHLOROTHIAZIDE 25 MG/1
TABLET ORAL
COMMUNITY
Start: 2024-04-05

## 2024-04-11 NOTE — PROGRESS NOTES
"Chief Complaint  Follow-up and Pain of the Left Hip    Subjective      Clark Layton presents to [facility] for 2-week follow-up of left hip bipolar anterior approach with Dr. Carlson on 3/27/2024.  Patient presents today in a wheelchair.  He is currently residing at [facility] and is working with physical therapy there.  They are ambulating him with use of a walker.  Reports that he fell 3 times since his surgery.  Patient has cognitive impairment.    Objective   No Known Allergies    Vital Signs:   /59   Pulse 69   Ht 170.2 cm (67\")   Wt 61.2 kg (135 lb)   SpO2 94%   BMI 21.14 kg/m²       Physical Exam    Constitutional: Awake, alert. Well nourished appearance.    Integumentary: Warm, dry, intact. No obvious rashes.    HENT: Atraumatic, normocephalic.   Respiratory: Non labored respirations .   Cardiovascular: Intact peripheral pulses.    Psychiatric: Normal mood and affect. A&O X3    Ortho Exam  Left hip: 3/5 strength to hip flexion, range of motion is appropriate with hip flexion.  No pain elicited with internal and external rotation of the hip.  Incision is well-approximated healing appropriately.  There is erythema on the proximal portion of the incision.  Small amount of drainage noted.  Examination limited due to cognitive status.    Imaging Results (Most Recent)       None                      Assessment and Plan   Problem List Items Addressed This Visit    None  Visit Diagnoses       Aftercare following left hip bipolar    -  Primary    Relevant Orders    Ambulatory Referral to Home Health (Outpatient) (Completed)            Follow Up   Return in about 4 weeks (around 5/9/2024).    Patient is a smoker, please discuss smoking cessation options with your PCP.    Social History     Socioeconomic History    Marital status:    Tobacco Use    Smoking status: Every Day     Current packs/day: 1.00     Average packs/day: 1 pack/day for 62.3 years (62.3 ttl pk-yrs)     " Types: Cigarettes     Start date: 1962    Smokeless tobacco: Never    Tobacco comments:     pt declines LDCT scan, states feels fine does not want   Vaping Use    Vaping status: Never Used   Substance and Sexual Activity    Alcohol use: Not Currently     Alcohol/week: 7.0 standard drinks of alcohol     Types: 7 Shots of liquor per week     Comment: Current some day, wife states that he is not drinking she puts water in the vodka container but he is not aware of this.    Drug use: Never    Sexual activity: Defer       Patient Instructions   X-rays reviewed, showing intact hardware. Sutures/staples removed in office, steri-strips applied. Keep incision clean and dry, allow steri-strips to fall off on their own in 7-10 days.  Educated on avoiding submersion in water until incision is fully healed.   Continue PT and use of walker until recommended by PT. Ambulate multiple times daily as tolerated.  Continue icing knee for approximately 15-20 minutes, three times daily, and as needed following PT.   Keflex ordered.  Follow-up in 4 weeks. Repeat imaging not needed at this visit.    Patient was given instructions and counseling regarding his condition or for health maintenance advice. Please see specific information pulled into the AVS if appropriate.

## 2024-04-11 NOTE — PATIENT INSTRUCTIONS
X-rays reviewed, showing intact hardware. Sutures/staples removed in office, steri-strips applied. Keep incision clean and dry, allow steri-strips to fall off on their own in 7-10 days.  Educated on avoiding submersion in water until incision is fully healed.   Continue PT and use of walker until recommended by PT. Ambulate multiple times daily as tolerated.  Continue icing knee for approximately 15-20 minutes, three times daily, and as needed following PT.   Keflex ordered.  Follow-up in 4 weeks. Repeat imaging not needed at this visit.

## 2024-04-27 ENCOUNTER — APPOINTMENT (OUTPATIENT)
Dept: CT IMAGING | Facility: HOSPITAL | Age: 84
End: 2024-04-27
Payer: MEDICARE

## 2024-04-27 ENCOUNTER — HOSPITAL ENCOUNTER (EMERGENCY)
Facility: HOSPITAL | Age: 84
Discharge: HOME | End: 2024-04-27
Attending: EMERGENCY MEDICINE
Payer: MEDICARE

## 2024-04-27 ENCOUNTER — NURSING HOME (OUTPATIENT)
Dept: INTERNAL MEDICINE | Age: 84
End: 2024-04-27
Payer: MEDICARE

## 2024-04-27 ENCOUNTER — APPOINTMENT (OUTPATIENT)
Dept: GENERAL RADIOLOGY | Facility: HOSPITAL | Age: 84
End: 2024-04-27
Payer: MEDICARE

## 2024-04-27 VITALS
WEIGHT: 144.62 LBS | HEIGHT: 68 IN | SYSTOLIC BLOOD PRESSURE: 160 MMHG | OXYGEN SATURATION: 100 % | BODY MASS INDEX: 21.92 KG/M2 | TEMPERATURE: 98.9 F | DIASTOLIC BLOOD PRESSURE: 69 MMHG | RESPIRATION RATE: 18 BRPM | HEART RATE: 68 BPM

## 2024-04-27 DIAGNOSIS — G30.9 SEVERE ALZHEIMER'S DEMENTIA WITH OTHER BEHAVIORAL DISTURBANCE, UNSPECIFIED TIMING OF DEMENTIA ONSET: Primary | ICD-10-CM

## 2024-04-27 DIAGNOSIS — G93.40 ENCEPHALOPATHY ACUTE: ICD-10-CM

## 2024-04-27 DIAGNOSIS — R41.82 ALTERED MENTAL STATUS, UNSPECIFIED ALTERED MENTAL STATUS TYPE: ICD-10-CM

## 2024-04-27 DIAGNOSIS — S72.002A LEFT DISPLACED FEMORAL NECK FRACTURE: ICD-10-CM

## 2024-04-27 DIAGNOSIS — F02.C18 SEVERE ALZHEIMER'S DEMENTIA WITH OTHER BEHAVIORAL DISTURBANCE, UNSPECIFIED TIMING OF DEMENTIA ONSET: Primary | ICD-10-CM

## 2024-04-27 DIAGNOSIS — F03.C0 SEVERE DEMENTIA, UNSPECIFIED DEMENTIA TYPE, UNSPECIFIED WHETHER BEHAVIORAL, PSYCHOTIC, OR MOOD DISTURBANCE OR ANXIETY: Primary | ICD-10-CM

## 2024-04-27 DIAGNOSIS — F17.218 NICOTINE DEPENDENCE, CIGARETTES, WITH OTHER NICOTINE-INDUCED DISORDERS: ICD-10-CM

## 2024-04-27 LAB
ALBUMIN SERPL-MCNC: 3.2 G/DL (ref 3.5–5.2)
ALBUMIN/GLOB SERPL: 0.9 G/DL
ALP SERPL-CCNC: 79 U/L (ref 39–117)
ALT SERPL W P-5'-P-CCNC: 8 U/L (ref 1–41)
ANION GAP SERPL CALCULATED.3IONS-SCNC: 8 MMOL/L (ref 5–15)
AST SERPL-CCNC: 16 U/L (ref 1–40)
BACTERIA UR QL AUTO: ABNORMAL /HPF
BASOPHILS # BLD AUTO: 0.04 10*3/MM3 (ref 0–0.2)
BASOPHILS NFR BLD AUTO: 0.4 % (ref 0–1.5)
BILIRUB SERPL-MCNC: 0.5 MG/DL (ref 0–1.2)
BILIRUB UR QL STRIP: NEGATIVE
BUN SERPL-MCNC: 22 MG/DL (ref 8–23)
BUN/CREAT SERPL: 20 (ref 7–25)
CALCIUM SPEC-SCNC: 8.9 MG/DL (ref 8.6–10.5)
CHLORIDE SERPL-SCNC: 105 MMOL/L (ref 98–107)
CLARITY UR: CLEAR
CO2 SERPL-SCNC: 30 MMOL/L (ref 22–29)
COLOR UR: YELLOW
CREAT SERPL-MCNC: 1.1 MG/DL (ref 0.76–1.27)
DEPRECATED RDW RBC AUTO: 54.6 FL (ref 37–54)
EGFRCR SERPLBLD CKD-EPI 2021: 67.4 ML/MIN/1.73
EOSINOPHIL # BLD AUTO: 0.02 10*3/MM3 (ref 0–0.4)
EOSINOPHIL NFR BLD AUTO: 0.2 % (ref 0.3–6.2)
ERYTHROCYTE [DISTWIDTH] IN BLOOD BY AUTOMATED COUNT: 14.9 % (ref 12.3–15.4)
FLUAV SUBTYP SPEC NAA+PROBE: NOT DETECTED
FLUBV RNA ISLT QL NAA+PROBE: NOT DETECTED
GEN 5 2HR TROPONIN T REFLEX: 69 NG/L
GLOBULIN UR ELPH-MCNC: 3.6 GM/DL
GLUCOSE SERPL-MCNC: 124 MG/DL (ref 65–99)
GLUCOSE UR STRIP-MCNC: NEGATIVE MG/DL
HCT VFR BLD AUTO: 33 % (ref 37.5–51)
HGB BLD-MCNC: 10.5 G/DL (ref 13–17.7)
HGB UR QL STRIP.AUTO: ABNORMAL
HOLD SPECIMEN: NORMAL
HOLD SPECIMEN: NORMAL
HYALINE CASTS UR QL AUTO: ABNORMAL /LPF
IMM GRANULOCYTES # BLD AUTO: 0.05 10*3/MM3 (ref 0–0.05)
IMM GRANULOCYTES NFR BLD AUTO: 0.5 % (ref 0–0.5)
KETONES UR QL STRIP: NEGATIVE
LEUKOCYTE ESTERASE UR QL STRIP.AUTO: ABNORMAL
LYMPHOCYTES # BLD AUTO: 1.24 10*3/MM3 (ref 0.7–3.1)
LYMPHOCYTES NFR BLD AUTO: 11.4 % (ref 19.6–45.3)
MAGNESIUM SERPL-MCNC: 2 MG/DL (ref 1.6–2.4)
MCH RBC QN AUTO: 31.9 PG (ref 26.6–33)
MCHC RBC AUTO-ENTMCNC: 31.8 G/DL (ref 31.5–35.7)
MCV RBC AUTO: 100.3 FL (ref 79–97)
MONOCYTES # BLD AUTO: 0.76 10*3/MM3 (ref 0.1–0.9)
MONOCYTES NFR BLD AUTO: 7 % (ref 5–12)
NEUTROPHILS NFR BLD AUTO: 8.75 10*3/MM3 (ref 1.7–7)
NEUTROPHILS NFR BLD AUTO: 80.5 % (ref 42.7–76)
NITRITE UR QL STRIP: NEGATIVE
NRBC BLD AUTO-RTO: 0 /100 WBC (ref 0–0.2)
NT-PROBNP SERPL-MCNC: 1419 PG/ML (ref 0–1800)
PH UR STRIP.AUTO: 6 [PH] (ref 5–8)
PLATELET # BLD AUTO: 181 10*3/MM3 (ref 140–450)
PMV BLD AUTO: 10.8 FL (ref 6–12)
POTASSIUM SERPL-SCNC: 3.9 MMOL/L (ref 3.5–5.2)
PROT SERPL-MCNC: 6.8 G/DL (ref 6–8.5)
PROT UR QL STRIP: ABNORMAL
QT INTERVAL: 351 MS
QTC INTERVAL: 506 MS
RBC # BLD AUTO: 3.29 10*6/MM3 (ref 4.14–5.8)
RBC # UR STRIP: ABNORMAL /HPF
REF LAB TEST METHOD: ABNORMAL
RSV RNA NPH QL NAA+NON-PROBE: NOT DETECTED
SARS-COV-2 RNA RESP QL NAA+PROBE: NOT DETECTED
SODIUM SERPL-SCNC: 143 MMOL/L (ref 136–145)
SP GR UR STRIP: 1.02 (ref 1–1.03)
SQUAMOUS #/AREA URNS HPF: ABNORMAL /HPF
TROPONIN T DELTA: 2 NG/L
TROPONIN T SERPL HS-MCNC: 67 NG/L
UROBILINOGEN UR QL STRIP: ABNORMAL
WBC # UR STRIP: ABNORMAL /HPF
WBC NRBC COR # BLD AUTO: 10.86 10*3/MM3 (ref 3.4–10.8)
WHOLE BLOOD HOLD COAG: NORMAL
WHOLE BLOOD HOLD SPECIMEN: NORMAL

## 2024-04-27 PROCEDURE — 83735 ASSAY OF MAGNESIUM: CPT

## 2024-04-27 PROCEDURE — 70450 CT HEAD/BRAIN W/O DYE: CPT

## 2024-04-27 PROCEDURE — 93005 ELECTROCARDIOGRAM TRACING: CPT | Performed by: EMERGENCY MEDICINE

## 2024-04-27 PROCEDURE — 81001 URINALYSIS AUTO W/SCOPE: CPT | Performed by: EMERGENCY MEDICINE

## 2024-04-27 PROCEDURE — 71045 X-RAY EXAM CHEST 1 VIEW: CPT

## 2024-04-27 PROCEDURE — 99309 SBSQ NF CARE MODERATE MDM 30: CPT | Performed by: INTERNAL MEDICINE

## 2024-04-27 PROCEDURE — 87637 SARSCOV2&INF A&B&RSV AMP PRB: CPT | Performed by: EMERGENCY MEDICINE

## 2024-04-27 PROCEDURE — 36415 COLL VENOUS BLD VENIPUNCTURE: CPT

## 2024-04-27 PROCEDURE — 93010 ELECTROCARDIOGRAM REPORT: CPT | Performed by: INTERNAL MEDICINE

## 2024-04-27 PROCEDURE — 93005 ELECTROCARDIOGRAM TRACING: CPT

## 2024-04-27 PROCEDURE — 99284 EMERGENCY DEPT VISIT MOD MDM: CPT

## 2024-04-27 PROCEDURE — 85025 COMPLETE CBC W/AUTO DIFF WBC: CPT | Performed by: EMERGENCY MEDICINE

## 2024-04-27 PROCEDURE — 84484 ASSAY OF TROPONIN QUANT: CPT | Performed by: EMERGENCY MEDICINE

## 2024-04-27 PROCEDURE — 84484 ASSAY OF TROPONIN QUANT: CPT

## 2024-04-27 PROCEDURE — 83880 ASSAY OF NATRIURETIC PEPTIDE: CPT | Performed by: EMERGENCY MEDICINE

## 2024-04-27 PROCEDURE — 80053 COMPREHEN METABOLIC PANEL: CPT

## 2024-04-27 PROCEDURE — P9612 CATHETERIZE FOR URINE SPEC: HCPCS

## 2024-04-27 RX ORDER — SODIUM CHLORIDE 0.9 % (FLUSH) 0.9 %
10 SYRINGE (ML) INJECTION AS NEEDED
Status: DISCONTINUED | OUTPATIENT
Start: 2024-04-27 | End: 2024-04-27 | Stop reason: HOSPADM

## 2024-04-27 NOTE — PROGRESS NOTES
Nursing Home Follow-Up Note      Ruddy Smith MD  [x]  [address] [facility], [address]  [place] [address]  Phone: [phone]  Fax: [phone]     PATIENT NAME: Clark Layton                                                                          YOB: 1940            DATE OF SERVICE: 04/27/2024  FACILITY:   [] [facility]   [x] [facility]  [] [facility]  [] Other      HISTORY OF PRESENT ILLNESS: Patient is being seen due to altered mental status has not been eating well for the past couple days the nurse called yesterday and we ordered labs which part of them have come back, the wife was concerned, he has not been acting himself he is very lethargic, he is not running a fever, he is normally up in the wheelchair but now has been in bed almost unresponsive at times, on exam today he is very lethargic drowsy he does try to communicate but he just mumbles, does not really follow commands, he looks very weak and dehydrated      PAST MEDICAL & SURGICAL HISTORY:   Past Medical History:   Diagnosis Date    Essential hypertension     Gout     High cholesterol 12/19/2018    Medication management 12/19/2018    Nicotine dependence, cigarettes, with other nicotine-induced disorders 09/19/2018      Past Surgical History:   Procedure Laterality Date    HIP BIPOLAR REPLACEMENT Left 3/27/2024    Procedure: HIP BIPOLAR ANTERIOR;  Surgeon: Bethany Carlson MD;  Location: [facility];  Service: Orthopedics;  Laterality: Left;          MEDICATIONS:  I have reviewed and reconciled the patients medication list in the patients chart at the skilled nursing facility today.        PHYSICAL EXAMINATION:   VITAL SIGNS: 136/77 temperature 97.0 sat 95% pulse 88 respiratory rate 18    PHYSICAL EXAM: His neck is supple his skin is dry his mouth is dry his teeth are dry his tongue is parched, his lungs are clear with diminished breath sounds cardiac  exam reveals a regular rhythm his abdomen is soft scaphoid his lower legs showed no edema with poor skin turgor, he does not follow commands he is not really responsive he is trying to mumble a few words, but not communicative      RECORDS REVIEW:   Orders Reviewed.  Labs Reviewed.      ICD-10-CM ICD-9-CM   1. Severe Alzheimer's dementia with other behavioral disturbance, unspecified timing of dementia onset  G30.9 331.0    F02.C18 294.11   2. Encephalopathy acute  G93.40 348.30   3. Left displaced femoral neck fracture  S72.002A 820.8   4. Nicotine dependence, cigarettes, with other nicotine-induced disorders  F17.218 292.89       ASSESSMENT/PLAN    Diagnoses and all orders for this visit:    1. Severe Alzheimer's dementia with other behavioral disturbance, unspecified timing of dementia onset (Primary)    2. Encephalopathy acute    3. Left displaced femoral neck fracture    4. Nicotine dependence, cigarettes, with other nicotine-induced disorders    Altered mental status with acute encephalopathy, etiology is unclear will send to emergency room for evaluation discussed with wife and nursing staff at length, patient is definitely dehydrated he has not been eating or drinking well for the past 2 to 3 days, probably needs IV fluids, we started him on Rocephin empirically last night for potential UTI and were awaiting lab work, April 27, 2024    Fall, left hip fracture status post left hip hemiarthroplasty March 27, 2024 Dr. Carlson, continue as needed hydrocodone 7.5-325 mg 1 tablet every 6 hours as needed for pain, patient's been continued on Lovenox up to this point, we will stop that completely at this time April 27, 2024    Acute kidney injury in the hospital improved at time of discharge will follow-up labs, avoiding nephrotoxic medication including low blood pressures    Anemia possibly postop and/or multifactorial    Acute hypoxic respiratory failure on top of underlying COPD with heavy tobacco use in the past,  will continue monitoring with nebulizer treatments to continue along with oxygen, continues Brovana nebs twice a day, Pulmicort neb 0.5 mg twice a day,, DuoNebs 4 times a day    Dementia Alzheimer's type moderate to severely advanced, continue supportive care, patient already appears frail, somewhat malnourished, will need to discuss care plan with family and goals of care for the foreseeable future.  Discussed with wife April 27, 2024, sending out to ER as above    Gout, continues allopurinol 300 mg daily    Nicotine dependence, has a nicotine patch and will continue 21 milligrams per day,, changing nicotine patch to 14 mg April 27, 2024    GI prophylaxis continues Pepcid 20 mg daily    Hypertension    BPH, continues Flomax 0.4 nightly    He is a DNR,    Follow-up lab work, pending today April 27, 2024  Ruddy Smith MD

## 2024-04-27 NOTE — ED PROVIDER NOTES
"Time: 1:43 PM EDT  Date of encounter:  4/27/2024  Independent Historian/Clinical History and Information was obtained by:   Family    History is limited by: Altered Mental Status    Chief Complaint: Mental status      History of Present Illness:  Patient is a 83 y.o. year old male who presents to the emergency department for evaluation of altered mental status for the past 3 days.  The wife at bedside states that patient has actually had worsening dementia for the past 1 month.  In the past 3 days he is \"basically comatose\".  There is no reported fall in the past week.  History limited as the patient only knows what the nursing home/rehab facility has told her, however she denies any knowledge of recent illness such as cough, fever, vomiting or diarrhea.  No further history available.    HPI    Patient Care Team  Primary Care Provider: Honorio Field MD    Past Medical History:     No Known Allergies  Past Medical History:   Diagnosis Date    Dementia      Past Surgical History:   Procedure Laterality Date    HIP FRACTURE SURGERY Left      History reviewed. No pertinent family history.    Home Medications:  Prior to Admission medications    Not on File        Social History:          Review of Systems:  Review of Systems   Unable to perform ROS: Mental status change   Psychiatric/Behavioral:  Positive for decreased concentration.         Physical Exam:  /69   Pulse 68   Temp 98.9 °F (37.2 °C) (Oral)   Resp 18   Ht 172.7 cm (68\")   Wt 65.6 kg (144 lb 10 oz)   SpO2 100%   BMI 21.99 kg/m²     Physical Exam  Vitals and nursing note reviewed.   Constitutional:       General: He is awake.      Appearance: Normal appearance. He is not ill-appearing or toxic-appearing.      Comments: Somnolent but arouses to verbal stimuli and light physical stimulus   HENT:      Head: Normocephalic and atraumatic.      Nose: Nose normal.      Mouth/Throat:      Mouth: Mucous membranes are moist.   Eyes:      " Extraocular Movements: Extraocular movements intact.      Pupils: Pupils are equal, round, and reactive to light.   Cardiovascular:      Rate and Rhythm: Normal rate and regular rhythm.      Heart sounds: Normal heart sounds.   Pulmonary:      Effort: Pulmonary effort is normal. No respiratory distress.      Breath sounds: Normal breath sounds. No wheezing, rhonchi or rales.   Abdominal:      General: Bowel sounds are normal.      Palpations: Abdomen is soft.      Tenderness: There is no abdominal tenderness. There is no guarding or rebound.      Comments: No rigidity   Musculoskeletal:         General: No tenderness. Normal range of motion.      Cervical back: Normal range of motion and neck supple.   Skin:     General: Skin is warm and dry.      Coloration: Skin is not jaundiced.   Neurological:      General: No focal deficit present.      Mental Status: He is disoriented.      Cranial Nerves: No cranial nerve deficit.   Psychiatric:         Mood and Affect: Mood normal.                  Procedures:  Procedures      Medical Decision Making:      Comorbidities that affect care:    Dementia    External Notes reviewed:    None      The following orders were placed and all results were independently analyzed by me:  Orders Placed This Encounter   Procedures    COVID-19, FLU A/B, RSV PCR 1 HR TAT - Swab, Nasopharynx    XR Chest 1 View    CT Head Without Contrast    [facility] Draw    Comprehensive Metabolic Panel    Single High Sensitivity Troponin T    Magnesium    Urinalysis With Microscopic If Indicated (No Culture) - Urine, Clean Catch    CBC Auto Differential    Urinalysis, Microscopic Only - Urine, Clean Catch    High Sensitivity Troponin T 2Hr    BNP    NPO Diet NPO Type: Strict NPO    Undress & Gown    Continuous Pulse Oximetry    Vital Signs    Orthostatic Blood Pressure    Oxygen Therapy- Nasal Cannula; Titrate 1-6 LPM Per SpO2; 90 - 95%    POC Glucose Once    ECG 12 Lead ED Triage Standing Order; Weak / Dizzy /  AMS    Insert Peripheral IV    Fall Precautions    CBC & Differential    Green Top (Gel)    Lavender Top    Gold Top - SST    Light Blue Top       Medications Given in the Emergency Department:  Medications   sodium chloride 0.9 % flush 10 mL (has no administration in time range)        ED Course:    ED Course as of 04/27/24 1552   Sat Apr 27, 2024   1317 I have personally interpreted the EKG today and it shows no evidence of any acute ischemia or heart arrhythmia.  Heavy artifact obscures interpretation.  Computer report of atrial fibrillation is incorrect. [RP]      ED Course User Index  [RP] Adria Capps MD       Labs:    Lab Results (last 24 hours)       Procedure Component Value Units Date/Time    Urinalysis With Microscopic If Indicated (No Culture) - Straight Cath [[id number]]  (Abnormal) Collected: 04/27/24 1126    Specimen: Urine from Straight Cath Updated: 04/27/24 1134     Color, UA Yellow     Appearance, UA Clear     pH, UA 6.0     Specific Gravity, UA 1.016     Glucose, UA Negative     Ketones, UA Negative     Bilirubin, UA Negative     Blood, UA Large (3+)     Protein, UA Trace     Leuk Esterase, UA Trace     Nitrite, UA Negative     Urobilinogen, UA 1.0 E.U./dL    Urinalysis, Microscopic Only - Straight Cath [[id number]]  (Abnormal) Collected: 04/27/24 1126    Specimen: Urine from Straight Cath Updated: 04/27/24 1135     RBC, UA Too Numerous to Count /HPF      WBC, UA 3-5 /HPF      Bacteria, UA None Seen /HPF      Squamous Epithelial Cells, UA 0-2 /HPF      Hyaline Casts, UA None Seen /LPF      Methodology Automated Microscopy    CBC & Differential [[id number]]  (Abnormal) Collected: 04/27/24 1131    Specimen: Blood Updated: 04/27/24 1139    Narrative:      The following orders were created for panel order CBC & Differential.  Procedure                               Abnormality         Status                     ---------                               -----------         ------                      CBC Auto Differential[[id number]]        Abnormal            Final result                 Please view results for these tests on the individual orders.    Comprehensive Metabolic Panel [[id number]]  (Abnormal) Collected: 04/27/24 1131    Specimen: Blood Updated: 04/27/24 1206     Glucose 124 mg/dL      BUN 22 mg/dL      Creatinine 1.10 mg/dL      Sodium 143 mmol/L      Potassium 3.9 mmol/L      Comment: Slight hemolysis detected by analyzer. Result may be falsely elevated.        Chloride 105 mmol/L      CO2 30.0 mmol/L      Calcium 8.9 mg/dL      Total Protein 6.8 g/dL      Albumin 3.2 g/dL      ALT (SGPT) 8 U/L      AST (SGOT) 16 U/L      Comment: Slight hemolysis detected by analyzer. Result may be falsely elevated.        Alkaline Phosphatase 79 U/L      Total Bilirubin 0.5 mg/dL      Globulin 3.6 gm/dL      A/G Ratio 0.9 g/dL      BUN/Creatinine Ratio 20.0     Anion Gap 8.0 mmol/L      eGFR 67.4 mL/min/1.73     Narrative:      GFR Normal >60  Chronic Kidney Disease <60  Kidney Failure <15    The GFR formula is only valid for adults with stable renal function between ages 18 and 70.    Single High Sensitivity Troponin T [[id number]]  (Abnormal) Collected: 04/27/24 1131    Specimen: Blood Updated: 04/27/24 1215     HS Troponin T 67 ng/L     Narrative:      High Sensitive Troponin T Reference Range:  <14.0 ng/L- Negative Female for AMI  <22.0 ng/L- Negative Male for AMI  >=14 - Abnormal Female indicating possible myocardial injury.  >=22 - Abnormal Male indicating possible myocardial injury.   Clinicians would have to utilize clinical acumen, EKG, Troponin, and serial changes to determine if it is an Acute Myocardial Infarction or myocardial injury due to an underlying chronic condition.         Magnesium [[id number]]  (Normal) Collected: 04/27/24 1131    Specimen: Blood Updated: 04/27/24 1206     Magnesium 2.0 mg/dL     CBC Auto Differential [[id number]]  (Abnormal) Collected: 04/27/24 1131    Specimen: Blood  Updated: 04/27/24 1139     WBC 10.86 10*3/mm3      RBC 3.29 10*6/mm3      Hemoglobin 10.5 g/dL      Hematocrit 33.0 %      .3 fL      MCH 31.9 pg      MCHC 31.8 g/dL      RDW 14.9 %      RDW-SD 54.6 fl      MPV 10.8 fL      Platelets 181 10*3/mm3      Neutrophil % 80.5 %      Lymphocyte % 11.4 %      Monocyte % 7.0 %      Eosinophil % 0.2 %      Basophil % 0.4 %      Immature Grans % 0.5 %      Neutrophils, Absolute 8.75 10*3/mm3      Lymphocytes, Absolute 1.24 10*3/mm3      Monocytes, Absolute 0.76 10*3/mm3      Eosinophils, Absolute 0.02 10*3/mm3      Basophils, Absolute 0.04 10*3/mm3      Immature Grans, Absolute 0.05 10*3/mm3      nRBC 0.0 /100 WBC     BNP [[id number]]  (Normal) Collected: 04/27/24 1131    Specimen: Blood Updated: 04/27/24 1259     proBNP 1,419.0 pg/mL     Narrative:      This assay is used as an aid in the diagnosis of individuals suspected of having heart failure. It can be used as an aid in the diagnosis of acute decompensated heart failure (ADHF) in patients presenting with signs and symptoms of ADHF to the emergency department (ED). In addition, NT-proBNP of <300 pg/mL indicates ADHF is not likely.    Age Range Result Interpretation  NT-proBNP Concentration (pg/mL:      <50             Positive            >450                   Gray                 300-450                    Negative             <300    50-75           Positive            >900                  Gray                300-900                  Negative            <300      >75             Positive            >1800                  Gray                300-1800                  Negative            <300    COVID-19, FLU A/B, RSV PCR 1 HR TAT - Swab, Nasopharynx [[id number]]  (Normal) Collected: 04/27/24 1345    Specimen: Swab from Nasopharynx Updated: 04/27/24 1436     COVID19 Not Detected     Influenza A PCR Not Detected     Influenza B PCR Not Detected     RSV, PCR Not Detected    Narrative:      Fact sheet for providers:  https://www.fda.gov/media/414058/download    Fact sheet for patients: https://www.fda.gov/media/737653/download    Test performed by PCR.    High Sensitivity Troponin T 2Hr [[id number]]  (Abnormal) Collected: 04/27/24 1408    Specimen: Blood from Arm, Right Updated: 04/27/24 1457     HS Troponin T 69 ng/L      Troponin T Delta 2 ng/L     Narrative:      High Sensitive Troponin T Reference Range:  <14.0 ng/L- Negative Female for AMI  <22.0 ng/L- Negative Male for AMI  >=14 - Abnormal Female indicating possible myocardial injury.  >=22 - Abnormal Male indicating possible myocardial injury.   Clinicians would have to utilize clinical acumen, EKG, Troponin, and serial changes to determine if it is an Acute Myocardial Infarction or myocardial injury due to an underlying chronic condition.                  Imaging:    CT Head Without Contrast    Result Date: 4/27/2024  CT HEAD WO CONTRAST-  Date of Exam: 4/27/2024 1:00 PM  Indication: headache.  Comparison: None available.  Technique: CT scan of the head without IV contrast.  Automated exposure control and iterative reconstruction methods were used.  FINDINGS No acute intracranial hemorrhage or extra-axial collection is identified. The ventricles appear normal in caliber, with no evidence of mass effect or midline shift. The basal cisterns appear patent. The gray-white differentiation appears preserved. 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 Scattered foci of periventricular and subcortical white matter hypodensities are nonspecific, but likely the sequela of mild chronic small vessel ischemic disease.  The calvarium appears intact. There is mucosal disease with an air-fluid level in the right maxillary sinus. The mastoid air cells are well-aerated.      1.  No acute intracranial process identified. 2.  Findings suggestive of mild chronic small vessel ischemic disease. 3.  Mucosal disease with air-fluid level in right maxillary sinus. Correlate for  acute sinusitis.  Electronically Signed By-Osito Dutton MD On:4/27/2024 2:01 PM      XR Chest 1 View    Result Date: 4/27/2024  XR CHEST 1 VW-  Date of Exam: 4/27/2024 11:30 AM  Indication: Weak/Dizzy/AMS triage protocol.  Comparison Exams: None available.  Technique: Single AP chest radiograph  FINDINGS: The lungs are clear. The heart and mediastinal contours appear normal. The pulmonary vasculature appears normal. The osseous structures appear intact.      No acute cardiopulmonary process identified.   Electronically Signed By-Osito Dutton MD On:4/27/2024 12:14 PM         Differential Diagnosis and Discussion:    Altered Mental Status: Based on the patient's signs and symptoms, differential diagnosis includes but is not limited to meningitis, stroke, sepsis, subarachnoid hemorrhage, intracranial bleeding, encephalitis, and metabolic encephalopathy.    All labs were reviewed and interpreted by me.  All X-rays impressions were independently interpreted by me.  EKG was interpreted by me.  CT scan radiology impression was interpreted by me.    MDM     Amount and/or Complexity of Data Reviewed  Clinical lab tests: reviewed  Tests in the radiology section of CPT®: reviewed  Tests in the medicine section of CPT®: reviewed                 Patient Care Considerations:    MRI: I considered ordering an MRI however patient has no focal neurologic deficits.      Consultants/Shared Management Plan:    None    Social Determinants of Health:    Patient is a nursing home/assisted living resident and has reliable access to care.      Disposition and Care Coordination:    Discharged: I considered escalation of care by admitting this patient to the hospital, however after long discussion with family at bedside she feels this is unnecessary.  The patient has severe dementia and hospitalization would only be for nursing home placement.  As the patient is currently in a rehab facility and can be cared for 24 hours it is  reasonable to return and work on transferring to the nursing facility first thing Monday morning.    I have explained the patient´s condition, diagnoses and treatment plan based on the information available to me at this time. I have answered questions and addressed any concerns. The patient has a good  understanding of the patient´s diagnosis, condition, and treatment plan as can be expected at this point. The vital signs have been stable. The patient´s condition is stable and appropriate for discharge from the emergency department.      The patient will pursue further outpatient evaluation with the primary care physician or other designated or consulting physician as outlined in the discharge instructions. They are agreeable to this plan of care and follow-up instructions have been explained in detail. The patient has received these instructions in written format and has expressed an understanding of the discharge instructions. The patient is aware that any significant change in condition or worsening of symptoms should prompt an immediate return to this or the closest emergency department or call to 911.    Final diagnoses:   Severe dementia, unspecified dementia type, unspecified whether behavioral, psychotic, or mood disturbance or anxiety   Altered mental status, unspecified altered mental status type        ED Disposition       ED Disposition   Discharge    Condition   Stable    Comment   --               This medical record created using voice recognition software.             Adria Capps MD  04/27/24 1056

## 2024-04-29 PROBLEM — K92.2 LOWER GI BLEED: Status: ACTIVE | Noted: 2024-01-01

## 2024-04-29 PROBLEM — K62.5 RECTAL BLEEDING: Status: ACTIVE | Noted: 2024-01-01

## 2024-04-29 NOTE — ED NOTES
ED to Inpatient Report    PATIENT DEMOGRAPHICS  Clark Layotn   1940  83 y.o.  male  No Known Allergies       04/29/24  0257   Weight: 62.5 kg (137 lb 12.6 oz)      There are no questions and answers to display.        HISTORY  Past Medical History:   Diagnosis Date    Essential hypertension     Gout     High cholesterol 12/19/2018    Medication management 12/19/2018    Nicotine dependence, cigarettes, with other nicotine-induced disorders 09/19/2018      Past Surgical History:   Procedure Laterality Date    HIP BIPOLAR REPLACEMENT Left 3/27/2024    Procedure: HIP BIPOLAR ANTERIOR;  Surgeon: Bethany Carlson MD;  Location: [facility] MAIN OR;  Service: Orthopedics;  Laterality: Left;     Prior to Admission medications    Medication Sig Start Date End Date Taking? Authorizing Provider   acetaminophen (TYLENOL) 325 MG tablet Take 1 tablet by mouth Every 4 (Four) Hours As Needed for Fever (Temperature Greater Than 101F). 4/4/24   Jamal Eaton MD   allopurinol (ZYLOPRIM) 300 MG tablet Take 1 tablet by mouth Daily. 7/31/23   David Singh III, MD   arformoterol (BROVANA) 15 MCG/2ML nebulizer solution Take 2 mL by nebulization 2 (Two) Times a Day. 4/4/24   Jamal Eaton MD   budesonide (PULMICORT) 0.5 MG/2ML nebulizer solution Take 2 mL by nebulization 2 (Two) Times a Day. 4/4/24   Jamal Eaton MD   Enoxaparin Sodium (LOVENOX) 30 MG/0.3ML solution prefilled syringe syringe Inject 0.3 mL under the skin into the appropriate area as directed Daily. Indications: Prevention of Unwanted Clot in Veins 4/5/24   Jamal Eaton MD   famotidine (PEPCID) 20 MG tablet Take 1 tablet by mouth Daily. 4/5/24   Jamal Eaton MD   hydroCHLOROthiazide 25 MG tablet  4/5/24   Provider, MD Wilder   HYDROcodone-acetaminophen (NORCO) 7.5-325 MG per tablet Take 1 tablet by mouth Every 6 (Six) Hours As Needed for Moderate Pain. 4/5/24   Ruddy Smith MD   ipratropium-albuterol (DUO-NEB)  0.5-2.5 mg/3 ml nebulizer Take 3 mL by nebulization Every 4 (Four) Hours As Needed for Shortness of Air. 4/4/24   Jamal Eaton MD   nicotine (NICODERM CQ) 21 MG/24HR patch Place 1 patch on the skin as directed by provider Daily. 4/5/24   Jamal Eaton MD   tamsulosin (FLOMAX) 0.4 MG capsule 24 hr capsule Take 1 capsule by mouth Daily. 7/31/23   David Singh III, MD        [facility]  Chief Complaint   Patient presents with    Rectal Bleeding     No diagnosis found.   Bryan Khan MD  Vitals:    04/29/24 0402 04/29/24 0457 04/29/24 0500 04/29/24 0600   BP:   106/50 91/59   BP Location:   Left arm Left arm   Patient Position:   Lying Lying   Pulse: 65 71 64 71   Resp:   14 16   Temp:       TempSrc:       SpO2:  94% 92% 91%   Weight:       Height:         Results for orders placed or performed during the hospital encounter of 04/29/24   Comprehensive Metabolic Panel    Specimen: Blood   Result Value Ref Range    Glucose 150 (H) 65 - 99 mg/dL    BUN 28 (H) 8 - 23 mg/dL    Creatinine 1.52 (H) 0.76 - 1.27 mg/dL    Sodium 139 136 - 145 mmol/L    Potassium 4.1 3.5 - 5.2 mmol/L    Chloride 103 98 - 107 mmol/L    CO2 26.6 22.0 - 29.0 mmol/L    Calcium 8.8 8.6 - 10.5 mg/dL    Total Protein 6.7 6.0 - 8.5 g/dL    Albumin 3.0 (L) 3.5 - 5.2 g/dL    ALT (SGPT) 10 1 - 41 U/L    AST (SGOT) 14 1 - 40 U/L    Alkaline Phosphatase 82 39 - 117 U/L    Total Bilirubin 0.3 0.0 - 1.2 mg/dL    Globulin 3.7 gm/dL    A/G Ratio 0.8 g/dL    BUN/Creatinine Ratio 18.4 7.0 - 25.0    Anion Gap 9.4 5.0 - 15.0 mmol/L    eGFR 45.7 (L) >60.0 mL/min/1.73   Lactic Acid, Plasma    Specimen: Blood   Result Value Ref Range    Lactate 1.5 0.5 - 2.0 mmol/L   CBC Auto Differential    Specimen: Blood   Result Value Ref Range    WBC 15.78 (H) 3.40 - 10.80 10*3/mm3    RBC 3.02 (L) 4.14 - 5.80 10*6/mm3    Hemoglobin 9.6 (L) 13.0 - 17.7 g/dL    Hematocrit 32.2 (L) 37.5 - 51.0 %    .6 (H) 79.0 - 97.0 fL    MCH 31.8 26.6 - 33.0 pg    MCHC 29.8  (L) 31.5 - 35.7 g/dL    RDW 14.8 12.3 - 15.4 %    RDW-SD 57.9 (H) 37.0 - 54.0 fl    MPV 11.8 6.0 - 12.0 fL    Platelets 187 140 - 450 10*3/mm3    Neutrophil % 83.9 (H) 42.7 - 76.0 %    Lymphocyte % 8.9 (L) 19.6 - 45.3 %    Monocyte % 5.6 5.0 - 12.0 %    Eosinophil % 0.2 (L) 0.3 - 6.2 %    Basophil % 0.5 0.0 - 1.5 %    Immature Grans % 0.9 (H) 0.0 - 0.5 %    Neutrophils, Absolute 13.24 (H) 1.70 - 7.00 10*3/mm3    Lymphocytes, Absolute 1.40 0.70 - 3.10 10*3/mm3    Monocytes, Absolute 0.89 0.10 - 0.90 10*3/mm3    Eosinophils, Absolute 0.03 0.00 - 0.40 10*3/mm3    Basophils, Absolute 0.08 0.00 - 0.20 10*3/mm3    Immature Grans, Absolute 0.14 (H) 0.00 - 0.05 10*3/mm3    nRBC 0.1 0.0 - 0.2 /100 WBC   Scan Slide    Specimen: Blood   Result Value Ref Range    Anisocytosis Slight/1+ None Seen    Crenated RBC's Slight/1+ None Seen    Macrocytes Slight/1+ None Seen    WBC Morphology Normal Normal    Platelet Estimate Adequate Normal    Large Platelets Slight/1+ None Seen   Type & Screen    Specimen: Blood   Result Value Ref Range    ABO Type AB     RH type Positive     Antibody Screen Negative     T&S Expiration Date 5/6/2024 11:59:00 PM    Green Top (Gel)   Result Value Ref Range    Extra Tube Hold for add-ons.    Lavender Top   Result Value Ref Range    Extra Tube hold for add-on    Gold Top - SST   Result Value Ref Range    Extra Tube Hold for add-ons.    Light Blue Top   Result Value Ref Range    Extra Tube Hold for add-ons.       No orders to display     Lines, Drains & Airways       Active LDAs       Name Placement date Placement time Site Days    Peripheral IV 04/29/24 Left Antecubital 04/29/24  --  Antecubital  less than 1                  Medications   sodium chloride 0.9 % flush 10 mL (has no administration in time range)      No orders to display        Anayeli Cypriot, RN  04/29/24 06:13 EDT

## 2024-04-29 NOTE — PLAN OF CARE
Goal Outcome Evaluation:  Plan of Care Reviewed With: patient        Progress: declining  Outcome Evaluation: Patient disoriented x4.  Transitioned to comfort care only. Crowder catheter inserted. PRN Morphine administered as ordered for pain. No other issues/needs a this time.

## 2024-04-29 NOTE — CONSULTS
04/29/24 1530   Spiritual Care   Use of Spiritual Resources non-Zoroastrianism use of spiritual care   Spiritual Care Source other (see comments)  (Comfort Measures Only)   Spiritual Care Follow-Up other (see comments)  (will follow as needed)   Response to Spiritual Care thanks expressed   Spiritual Care Visit Type initial   Spiritual Care Request end-of-life issue(s) support   Receptivity to Spiritual Care visit welcomed

## 2024-04-29 NOTE — ED TRIAGE NOTES
Patient presents to ED via [facility] with C/C of rectal bleeding with bright red blood.     Upon arrival patient observed with bright red blood covering entirety of his brief with clots present.     Patient confused, which is baseline.

## 2024-04-29 NOTE — PROGRESS NOTES
[facility]   Hospitalist Progress Note    Date of admission: 4/29/2024  Patient Name: Clark Layton  1940  Date: 4/29/2024      Subjective     Chief Complaint   Patient presents with    Rectal Bleeding       Interval Followup: Patient with persistently altered mentation, restlessness, having hypotension on blood pressure this morning with intermittent hypoxia.      Discussed with patient's wife Shae at bedside.  Had extended discussion of goals of care.  She unfortunately has aware of his continued decline and concern that he was coming closer to needing hospice and comfort measures.  Discussed patient's current multiple medical issues at length and understands that he would not want to live like this and ultimately agrees that transitioning to comfort measures only at this time would be appropriate/but she would want to do.      Objective     Vitals:   Temp:  [97.9 °F (36.6 °C)-98 °F (36.7 °C)] 98 °F (36.7 °C)  Heart Rate:  [59-71] 71  Resp:  [14-20] 16  BP: ()/(47-59) 91/59    Physical Exam  Ill-appearing in bed thin frail dry mucosa  Mild rhonchi, symmetric chest rise on room air  RRR no lower extremity pitting edema  Abdomen soft nontender distended  Crowder catheter placed after discussion with nurse  Not alert or oriented unable to answer questions, restless    Result Review:  Vital signs, labs and recent relevant imaging reviewed.      CBC          4/4/2024    03:06 4/26/2024    19:02 4/29/2024    03:19   CBC   WBC 13.61  11.45  15.78    RBC 2.92  3.35  3.02    Hemoglobin 9.1  10.4  9.6    Hematocrit 30.5  32.1  32.2    .5  95.8  106.6    MCH 31.2  31.0  31.8    MCHC 29.8  32.4  29.8    RDW 13.7  13.9  14.8    Platelets 274  223  187      CMP          4/4/2024    03:06 4/26/2024    19:02 4/29/2024    03:19   CMP   Glucose 141  150  150    BUN 40  24  28    Creatinine 1.03  1.11  1.52    EGFR 73.0  66.7  45.7    Sodium 139  143  139    Potassium 3.7  3.6  4.1    Chloride 97  103  103     Calcium 8.9  9.0  8.8    Total Protein 6.2  6.0  6.7    Albumin 2.9  3.5  3.0    Globulin 3.3  2.5  3.7    Total Bilirubin 0.3  0.7  0.3    Alkaline Phosphatase 82  90  82    AST (SGOT) 41  13  14    ALT (SGPT) 36  6  10    Albumin/Globulin Ratio 0.9  1.4  0.8    BUN/Creatinine Ratio 38.8  21.6  18.4    Anion Gap 6.4  11.1  9.4          acetaminophen    aluminum-magnesium hydroxide-simethicone    [DISCONTINUED] senna-docusate sodium **AND** [DISCONTINUED] polyethylene glycol **AND** bisacodyl **AND** bisacodyl    Diclofenac Sodium    hydrOXYzine    Lidocaine    melatonin    nicotine    ondansetron    polyethylene glycol    prochlorperazine    sodium chloride    sodium chloride    sodium chloride    [START ON 4/30/2024] allopurinol, 300 mg, Oral, Daily  arformoterol, 15 mcg, Nebulization, BID - RT  budesonide, 0.5 mg, Nebulization, BID - RT  cefTRIAXone, 1 g, Intravenous, Daily  famotidine, 20 mg, Oral, Daily  hydrocortisone, 25 mg, Rectal, BID  LORazepam, 0.5 mg, Oral, BID  memantine, 10 mg, Oral, BID  sodium chloride, 10 mL, Intravenous, Q12H  tamsulosin, 0.4 mg, Oral, Daily        No radiology results for the last 7 days    Assessment / Plan     Summary: 83-year-old male with advanced Alzheimer's dementia in a nursing home, nonverbal baseline, recent discharge 4/4 with fall with left femoral neck fracture treated with left Turner arthroplasty on 3/27, hospital course at that time complicated by BÁRBARA which had resolved by discharge, suspected undiagnosed COPD (requiring oxygen at discharge), dCHF (unclear if discharged on diuretics).    Patient send from NH with new rectal bleeding, found to have bleeding from catheter and straight cath initially, worsening mentation and worsening Alzheimer's dementia.  Ultimately after discussion further with patient's wife transition to hospice/comfort measures only    Assessment/Plan (clinically significant if listed here)  Suspected lower GI bleeding in setting of Lovenox from  recent postoperative hip fracture, question hemorrhoidal  Hematuria  Leukocytosis, on outpatient antibiotics for ?UTI  Acute renal failure suspect multifactorial with prerenal from decreased p.o. intake as well as significant urinary retention of over 1.3 L  Urinary retention requiring francois catheter, with hx of BPH  Terminal/Advanced Alzheimer's dementia essentially nonverbal at baseline, with behavioral disturbances  COPD, suspected undiagnosed, extensive smoking history, however requiring question 3 L at time of recent discharge  Diastolic CHF  History of hypertension  Iron deficiency anemia  Macrocytic anemia  Gout    Initially plans for further workup treatment antibiotics and testing as below with GI consult.  After discussion with wife given patient's critical illness and end-stage dementia transition to comfort measures for now   PVR elevated greater than 1300 placing indwelling Francois catheter at this time  Atropine prn secretions  Haloperidol prn agitation/nausea/vomiting  Lorazepam intensol prn anxiety/restlessness  Morphine prn pain/comfort/air hunger; monitor response to treatment and adjust as needed based on continued response   discontinued non-essential medications/medications not focused for comfort medications and vital signs  francois prn comfort  palliative care consult/hospice  Diet: comfort feeds  Code: DNR comfort care measures only  Dispo: pending hospice evaluation/safe dispo        Discontinue additional measures as below given transition to comfort measures:   avoid anticoagulation, hemoglobin 9.6 having GI bleeding reports and also had blood in urine with catheterization question component from irritation from catheterization.    N.p.o., iv fluids for now monitor for fluid overload, check pvr, add on BNP (had fluid overload issues with chf last admission)  monitor renal fxn, (near cr 1.2-1.4 although cr 1.0 at recent discharge)  procal, cxr, blood cultures, check infectious studies, wbc  15.7, suspect developing sepsis forom uti and possible pneumonia  UA/striaght cath.  White count was elevated at recent discharge at 13.6,   *Apparently had been started on ceftriaxone IM 1 g daily on 4/26 to complete 4/29 unclear indication likely related to UTI we will try to follow-up  Has macrocytosis, B12 and folate were stable when recently checked, and notably low iron levels on 4/2    Per outpatient Ortho follow-up appears he had fallen 3 times since surgery at that time but otherwise wound and surgical site appears stable at that time  Is on twice daily lorazepam, with advance dementia and recent falls likely contributing   npo, slp eval, aspiration precautions  Resume Brovana Pulmicort, as needed nebs for COPD, was on 2-3l at discharge it seems, room air here, intermittently hypoxemic monitr  Continue famotidine  Continue memantine, delirium precautions         DVT prophylaxis:  Mechanical DVT prophylaxis orders are present.      Level Of Support Discussed With: Next of Kin (If No Surrogate)  Code Status (Patient has no pulse and is not breathing): No CPR (Do Not Attempt to Resuscitate)  Medical Interventions (Patient has pulse or is breathing): Full Support      Patient is critically ill due to GI bleeding/blood loss anemia, acute renal failure, urinary retention, hypotension and developing sepsis from uti and likely aspiration,  and additional issues as noted above.  I have spent >31 minutes of critical care time reviewing documentation, pertinent labs, imaging studies, examining the patient, modifying care plan, and discussing patient's condition and care plan with the pt's wife as he is unable to make decisions given his altered mentation/advanced dementia, discussion with nursing social work

## 2024-04-29 NOTE — H&P
[facility]   HISTORY AND PHYSICAL    Patient Name: Clark Layton  : 1940  MRN: [id number]  Primary Care Physician:  Honorio Field MD  Date of admission: 2024    Subjective   Subjective     Chief Complaint: Rectal bleeding    HPI:    Clark Layton is a 83 y.o. male with past medical history of advanced dementia, hypertension, hyperlipidemia and GERD presented to the ED from nursing home for evaluation of rectal bleeding.  Patient had hip repair surgery last month and had been on Lovenox.  Nursing staff noted significant rectal bleeding so he was brought to the ED for further evaluation.  Patient is nonverbal but history was given by wife at bedside.  Wife stated at baseline patient usually does not speak and has had a sharp decline over the last few weeks and is no longer eating.  In the ED patient's vitals were all within normal limits on arrival.  Labs showed that he had a hemoglobin of 9.6 which is down from 10.4 four days ago.  You are requested the patient be observed overnight for possible transfusion if needed.        Review of Systems   Patient with advanced dementia    Personal History     Past Medical History:   Diagnosis Date    Essential hypertension     Gout     High cholesterol 2018    Medication management 2018    Nicotine dependence, cigarettes, with other nicotine-induced disorders 2018       Past Surgical History:   Procedure Laterality Date    HIP BIPOLAR REPLACEMENT Left 3/27/2024    Procedure: HIP BIPOLAR ANTERIOR;  Surgeon: Bethany Carlson MD;  Location: [facility];  Service: Orthopedics;  Laterality: Left;       Family History: family history includes Brain cancer in his brother; Heart disease in his father. Otherwise pertinent FHx was reviewed and not pertinent to current issue.    Social History:  reports that he has been smoking cigarettes. He started smoking about 62 years ago. He has a 62.3 pack-year smoking history. He has never used  smokeless tobacco. He reports that he does not currently use alcohol after a past usage of about 7.0 standard drinks of alcohol per week. He reports that he does not use drugs.    Home Medications:  Enoxaparin Sodium, HYDROcodone-acetaminophen, acetaminophen, allopurinol, arformoterol, budesonide, famotidine, hydroCHLOROthiazide, ipratropium-albuterol, nicotine, and tamsulosin      Allergies:  No Known Allergies    Objective   Objective     Vitals:   Temp:  [97.9 °F (36.6 °C)-98 °F (36.7 °C)] 98 °F (36.7 °C)  Heart Rate:  [59-71] 71  Resp:  [14-20] 16  BP: ()/(47-59) 91/59  Physical Exam    Constitutional: Lethargic, nonverbal, advanced dementia, cachectic   Eyes: PERRLA, sclerae anicteric, no conjunctival injection   HENT: NCAT, dry mucous membrane   Neck: Supple, no thyromegaly, no lymphadenopathy, trachea midline   Respiratory: Clear to auscultation bilaterally, nonlabored respirations    Cardiovascular: RRR, no murmurs, rubs, or gallops, palpable pedal pulses bilaterally   Gastrointestinal: Positive bowel sounds, soft, nontender, nondistended   Musculoskeletal: No bilateral ankle edema, no clubbing or cyanosis to extremities   Psychiatric: Unable to evaluate   Neurologic: Pupils reactive   Skin: No rashes     Result Review    Result Review:  I have personally reviewed the results from the time of this admission to 4/29/2024 06:13 EDT and agree with these findings:  [x]  Laboratory list / accordion  []  Microbiology  []  Radiology  []  EKG/Telemetry   []  Cardiology/Vascular   []  Pathology  []  Old records  []  Other:  Most notable findings include: Anemia      Assessment & Plan   Assessment / Plan     Brief Patient Summary:  Clark Layton is a 83 y.o. male with past medical history of advanced dementia, hypertension, hyperlipidemia and GERD presented to the ED from nursing home for evaluation of rectal bleeding    Active Hospital Problems:  Active Hospital Problems    Diagnosis     **Rectal bleeding      Lower GI bleed     Severe Alzheimer's dementia     Benign prostatic hyperplasia     Essential hypertension      Plan:     Rectal bleeding  -Admit to telemetry  -Patient on Lovenox postoperatively  -Hemoglobin 9.6  -Follow hemoglobin level, transfuse as needed  -Will hold Lovenox  -GI consult if needed  -Supportive care    HTN  -Currently well controlled  -PRN BP meds  -Resume home meds when available  -Titrate if needed    Advanced dementia  -Patient with limited p.o. intake  -IVF  -Supplement meals with Ensure  -Patient DNR    GI ppx  DVT ppx    CODE STATUS: DNR     Admission Status:  I believe this patient meets observation status.      Electronically signed by Bryan Khan MD, 04/29/24, 6:13 AM EDT.

## 2024-04-29 NOTE — CONSULTS
Purpose of the visit was to evaluate for: hospice referral/discussion. Spoke with RN and HCS and discussed palliative care, [facility], and Comfort measures only .      Assessment: Patient is currently on 4nt- updated by primary rn regarding patients current condition. Patient laying in bed with eyes closed during visit- patients wife at bedside. Introduced self and explained role and purpose of visit. Provided further education on comfort measures only, signs/symptoms of end of life, and signs/symptoms of nonverbal signs of discomfort. Provided education on [facility] services and discharge options. Patients spouse to discuss further with family. Emotional support provided. Provided pamphlets/palliative care contact information and encouraged to call with further questions/concerns.    Tasks Completed: Emotional Support.    Palliative care will continue to follow to support and assist.    Ondina VANG, RN, CHPN  Palliative Care

## 2024-04-29 NOTE — ED PROVIDER NOTES
Time: 5:06 AM EDT  Date of encounter:  4/29/2024  Independent Historian/Clinical History and Information was obtained by:   Patient    History is limited by: N/A    Chief Complaint: blood in stool      History of Present Illness:  Patient is a 83 y.o. year old male who presents to the emergency department for evaluation of Blood in stool.  Per report patient had a grossly bloody bowel movement with large clots.  He is currently on anticoagulation.  No history of similar episodes in the past.  Patient has a history of dementia and is confused at baseline.  History limited mostly obtained from wife.    HPI    Patient Care Team  Primary Care Provider: Honorio Field MD    Past Medical History:     No Known Allergies  Past Medical History:   Diagnosis Date    Essential hypertension     Gout     High cholesterol 12/19/2018    Medication management 12/19/2018    Nicotine dependence, cigarettes, with other nicotine-induced disorders 09/19/2018     Past Surgical History:   Procedure Laterality Date    HIP BIPOLAR REPLACEMENT Left 3/27/2024    Procedure: HIP BIPOLAR ANTERIOR;  Surgeon: Bethany Carlson MD;  Location: [facility]  Service: Orthopedics;  Laterality: Left;     Family History   Problem Relation Age of Onset    Heart disease Father     Brain cancer Brother        Home Medications:  Prior to Admission medications    Medication Sig Start Date End Date Taking? Authorizing Provider   acetaminophen (TYLENOL) 325 MG tablet Take 1 tablet by mouth Every 4 (Four) Hours As Needed for Fever (Temperature Greater Than 101F). 4/4/24   Jamal Eaton MD   allopurinol (ZYLOPRIM) 300 MG tablet Take 1 tablet by mouth Daily. 7/31/23   David Singh III, MD   arformoterol (BROVANA) 15 MCG/2ML nebulizer solution Take 2 mL by nebulization 2 (Two) Times a Day. 4/4/24   Jamal Eaton MD   budesonide (PULMICORT) 0.5 MG/2ML nebulizer solution Take 2 mL by nebulization 2 (Two) Times a Day. 4/4/24   Angelito  "Jamal Martinez MD   Enoxaparin Sodium (LOVENOX) 30 MG/0.3ML solution prefilled syringe syringe Inject 0.3 mL under the skin into the appropriate area as directed Daily. Indications: Prevention of Unwanted Clot in Veins 4/5/24   Jamal Eaton MD   famotidine (PEPCID) 20 MG tablet Take 1 tablet by mouth Daily. 4/5/24   Jamal Eaton MD   hydroCHLOROthiazide 25 MG tablet  4/5/24   Provider, MD Wilder   HYDROcodone-acetaminophen (NORCO) 7.5-325 MG per tablet Take 1 tablet by mouth Every 6 (Six) Hours As Needed for Moderate Pain. 4/5/24   Ruddy Smith MD   ipratropium-albuterol (DUO-NEB) 0.5-2.5 mg/3 ml nebulizer Take 3 mL by nebulization Every 4 (Four) Hours As Needed for Shortness of Air. 4/4/24   Jamal Eaton MD   nicotine (NICODERM CQ) 21 MG/24HR patch Place 1 patch on the skin as directed by provider Daily. 4/5/24   Jamal Eaton MD   tamsulosin (FLOMAX) 0.4 MG capsule 24 hr capsule Take 1 capsule by mouth Daily. 7/31/23   David Singh III, MD        Social History:   Social History     Tobacco Use    Smoking status: Every Day     Current packs/day: 1.00     Average packs/day: 1 pack/day for 62.3 years (62.3 ttl pk-yrs)     Types: Cigarettes     Start date: 1962    Smokeless tobacco: Never    Tobacco comments:     pt declines LDCT scan, states feels fine does not want   Vaping Use    Vaping status: Never Used   Substance Use Topics    Alcohol use: Not Currently     Alcohol/week: 7.0 standard drinks of alcohol     Types: 7 Shots of liquor per week     Comment: Current some day, wife states that he is not drinking she puts water in the vodka container but he is not aware of this.    Drug use: Never         Review of Systems:  Review of Systems   Unable to perform ROS: Dementia        Physical Exam:  BP 91/59 (BP Location: Left arm, Patient Position: Lying)   Pulse 71   Temp 98 °F (36.7 °C) (Oral)   Resp 16   Ht 170.2 cm (67\")   Wt 62.5 kg (137 lb 12.6 oz)   " SpO2 91%   BMI 21.58 kg/m²     Physical Exam  Constitutional:       Appearance: Normal appearance.   HENT:      Head: Normocephalic and atraumatic.      Nose: Nose normal.      Mouth/Throat:      Mouth: Mucous membranes are moist.   Eyes:      Extraocular Movements: Extraocular movements intact.      Conjunctiva/sclera: Conjunctivae normal.      Pupils: Pupils are equal, round, and reactive to light.   Cardiovascular:      Rate and Rhythm: Normal rate and regular rhythm.      Pulses: Normal pulses.      Heart sounds: Normal heart sounds.   Pulmonary:      Effort: Pulmonary effort is normal.      Breath sounds: Normal breath sounds.   Abdominal:      General: There is no distension.      Palpations: Abdomen is soft.      Tenderness: There is no abdominal tenderness.   Genitourinary:     Comments: Bright red blood per rectum  Musculoskeletal:         General: Normal range of motion.      Cervical back: Normal range of motion.   Skin:     General: Skin is warm and dry.      Capillary Refill: Capillary refill takes less than 2 seconds.   Neurological:      Mental Status: He is alert. Mental status is at baseline.   Psychiatric:         Mood and Affect: Mood normal.         Behavior: Behavior normal.                  Procedures:  Procedures      Medical Decision Making:      Comorbidities that affect care:    HLD, Hypertension    External Notes reviewed:    Previous Clinic Note: Patient seen by orthopedic surgery on 4/11/2024 for follow-up and pain of left hip s/p surgery      The following orders were placed and all results were independently analyzed by me:  Orders Placed This Encounter   Procedures    [place] Draw    Comprehensive Metabolic Panel    Lactic Acid, Plasma    CBC Auto Differential    Scan Slide    Protime-INR    Undress & Gown    Measure Blood Pressure    Vital Signs    Orthostatic Blood Pressure    Code Status and Medical Interventions:    Inpatient Hospitalist Consult    Pulse Oximetry    Oxygen  Therapy- Nasal Cannula; Titrate 1-6 LPM Per SpO2; 90 - 95%    Type & Screen    Insert Peripheral IV    Initiate Observation Status    CBC & Differential    Green Top (Gel)    Lavender Top    Gold Top - SST    Light Blue Top       Medications Given in the Emergency Department:  Medications   sodium chloride 0.9 % flush 10 mL (has no administration in time range)        ED Course:         Labs:    Lab Results (last 24 hours)       Procedure Component Value Units Date/Time    CBC & Differential [[id number]]  (Abnormal) Collected: 04/29/24 0319    Specimen: Blood Updated: 04/29/24 0349    Narrative:      The following orders were created for panel order CBC & Differential.  Procedure                               Abnormality         Status                     ---------                               -----------         ------                     CBC Auto Differential[[id number]]        Abnormal            Final result               Scan Slide[[id number]]                                       Final result                 Please view results for these tests on the individual orders.    Comprehensive Metabolic Panel [[id number]]  (Abnormal) Collected: 04/29/24 0319    Specimen: Blood Updated: 04/29/24 0342     Glucose 150 mg/dL      BUN 28 mg/dL      Creatinine 1.52 mg/dL      Sodium 139 mmol/L      Potassium 4.1 mmol/L      Chloride 103 mmol/L      CO2 26.6 mmol/L      Calcium 8.8 mg/dL      Total Protein 6.7 g/dL      Albumin 3.0 g/dL      ALT (SGPT) 10 U/L      AST (SGOT) 14 U/L      Alkaline Phosphatase 82 U/L      Total Bilirubin 0.3 mg/dL      Globulin 3.7 gm/dL      A/G Ratio 0.8 g/dL      BUN/Creatinine Ratio 18.4     Anion Gap 9.4 mmol/L      eGFR 45.7 mL/min/1.73     Narrative:      GFR Normal >60  Chronic Kidney Disease <60  Kidney Failure <15    The GFR formula is only valid for adults with stable renal function between ages 18 and 70.    Lactic Acid, Plasma [[id number]]  (Normal) Collected: 04/29/24 0319     Specimen: Blood Updated: 04/29/24 0341     Lactate 1.5 mmol/L     CBC Auto Differential [[id number]]  (Abnormal) Collected: 04/29/24 0319    Specimen: Blood Updated: 04/29/24 0349     WBC 15.78 10*3/mm3      RBC 3.02 10*6/mm3      Hemoglobin 9.6 g/dL      Hematocrit 32.2 %      .6 fL      MCH 31.8 pg      MCHC 29.8 g/dL      RDW 14.8 %      RDW-SD 57.9 fl      MPV 11.8 fL      Platelets 187 10*3/mm3      Neutrophil % 83.9 %      Lymphocyte % 8.9 %      Monocyte % 5.6 %      Eosinophil % 0.2 %      Basophil % 0.5 %      Immature Grans % 0.9 %      Neutrophils, Absolute 13.24 10*3/mm3      Lymphocytes, Absolute 1.40 10*3/mm3      Monocytes, Absolute 0.89 10*3/mm3      Eosinophils, Absolute 0.03 10*3/mm3      Basophils, Absolute 0.08 10*3/mm3      Immature Grans, Absolute 0.14 10*3/mm3      nRBC 0.1 /100 WBC     Scan Slide [[id number]] Collected: 04/29/24 0319    Specimen: Blood Updated: 04/29/24 0349     Anisocytosis Slight/1+     Crenated RBC's Slight/1+     Macrocytes Slight/1+     WBC Morphology Normal     Platelet Estimate Adequate     Large Platelets Slight/1+             Imaging:    No Radiology Exams Resulted Within Past 24 Hours      Differential Diagnosis and Discussion:    GI Bleeding: Differential diagnosis includes but is not limited to gastritis, gastric ulcer, stress ulcer, duodenitis, Noemi-Sullivan tears, esophageal varices, angiodysplasia, aortic enteric fistula, hematologic issues including thrombocytopenia, GI neoplasm, ulcerative colitis, Crohn's disease, diverticulosis, diverticulitis, hemorrhoids, aortic aneurysm, and polyps    All labs were reviewed and interpreted by me.    MDM  Number of Diagnoses or Management Options  Diagnosis management comments: Patient is afebrile nontoxic-appearing.  Vital signs are stable.  Labs show a hemoglobin of 9.6.  This is slightly down from previous 10.4.  Creatinine slightly elevated 1.5 that is slightly up from previous.  While in the emergency department  patient had a grossly bloody bowel movement with some clots.  He is currently on anticoagulation.  Will admit for close monitoring and further evaluation.       Amount and/or Complexity of Data Reviewed  Clinical lab tests: reviewed  Tests in the radiology section of CPT®: reviewed  Review and summarize past medical records: yes  Independent visualization of images, tracings, or specimens: yes    Risk of Complications, Morbidity, and/or Mortality  Presenting problems: moderate  Management options: moderate                 Patient Care Considerations:    CT ABDOMEN AND PELVIS: I considered ordering a CT scan of the abdomen and pelvis however no abdominal tenderness on exam      Consultants/Shared Management Plan:    Hospitalist: I have discussed the case with Dr Beaver who agrees to accept the patient for admission.    Social Determinants of Health:    Patient is a nursing home/assisted living resident and has reliable access to care.      Disposition and Care Coordination:    Admit:   Through independent evaluation of the patient's history, physical, and imperical data, the patient meets criteria for inpatient admission to the hospital.        Final diagnoses:   Lower GI bleed        ED Disposition       ED Disposition   Decision to Admit    Condition   --    Comment   Level of Care: Remote Telemetry [26]   Diagnosis: Lower GI bleed [[id number]]   Admitting Physician: DAXA BEAVER [[id number]]                 This medical record created using voice recognition software.             Genaro Childress MD  04/29/24 0690

## 2024-04-30 NOTE — PROGRESS NOTES
[facility]   Hospitalist Progress Note    Date of admission: 4/29/2024  Patient Name: Clark Layton  1940  Date: 4/30/2024      Subjective     Chief Complaint   Patient presents with    Rectal Bleeding       Interval Followup: Resting more comfortably today, altered nonverbal.  Wife at bedside updated/discuss further.  He has occasionally reached for his Crowder catheter but otherwise seems to be much more comfortable      Objective     Vitals:   Temp:  [97.9 °F (36.6 °C)-98.4 °F (36.9 °C)] 97.9 °F (36.6 °C)  Heart Rate:  [85-92] 85  Resp:  [16] 16  BP: (103-116)/(42-47) 103/47    Physical Exam  Ill-appearing in bed thin frail dry mucosa, nonlabored respirations on room air symmetric chest rise  Abdomen nondistended  Crowder catheter with yellow urine output slightly concentrated appearance      Result Review:  Vital signs, labs and recent relevant imaging reviewed.      CBC          4/4/2024    03:06 4/26/2024    19:02 4/29/2024    03:19   CBC   WBC 13.61  11.45  15.78    RBC 2.92  3.35  3.02    Hemoglobin 9.1  10.4  9.6    Hematocrit 30.5  32.1  32.2    .5  95.8  106.6    MCH 31.2  31.0  31.8    MCHC 29.8  32.4  29.8    RDW 13.7  13.9  14.8    Platelets 274  223  187      CMP          4/4/2024    03:06 4/26/2024    19:02 4/29/2024    03:19   CMP   Glucose 141  150  150    BUN 40  24  28    Creatinine 1.03  1.11  1.52    EGFR 73.0  66.7  45.7    Sodium 139  143  139    Potassium 3.7  3.6  4.1    Chloride 97  103  103    Calcium 8.9  9.0  8.8    Total Protein 6.2  6.0  6.7    Albumin 2.9  3.5  3.0    Globulin 3.3  2.5  3.7    Total Bilirubin 0.3  0.7  0.3    Alkaline Phosphatase 82  90  82    AST (SGOT) 41  13  14    ALT (SGPT) 36  6  10    Albumin/Globulin Ratio 0.9  1.4  0.8    BUN/Creatinine Ratio 38.8  21.6  18.4    Anion Gap 6.4  11.1  9.4          acetaminophen    aluminum-magnesium hydroxide-simethicone    atropine    [DISCONTINUED] senna-docusate sodium **AND** [DISCONTINUED] polyethylene glycol  **AND** bisacodyl **AND** bisacodyl    Diclofenac Sodium    haloperidol **OR** haloperidol **OR** haloperidol lactate    hydrOXYzine    Lidocaine    LORazepam **OR** LORazepam **OR** LORazepam **OR** LORazepam **OR** LORazepam **OR** LORazepam    melatonin    Morphine **OR** morphine    nicotine    ondansetron    polyethylene glycol    sodium chloride    sodium chloride    sodium chloride    LORazepam, 0.5 mg, Oral, BID  pantoprazole, 40 mg, Intravenous, Q AM        No radiology results for the last 7 days    Assessment / Plan     Summary: 83-year-old male with advanced Alzheimer's dementia in a nursing home, nonverbal baseline, recent discharge 4/4 with fall with left femoral neck fracture treated with left Turner arthroplasty on 3/27, hospital course at that time complicated by BÁRBARA which had resolved by discharge, suspected undiagnosed COPD (requiring oxygen at discharge), dCHF (unclear if discharged on diuretics).    Patient send from NH with new rectal bleeding, found to have bleeding from catheter and straight cath initially, worsening mentation and worsening Alzheimer's dementia.  Ultimately after discussion further with patient's wife transition to hospice/comfort measures only    Assessment/Plan (clinically significant if listed here)  Suspected lower GI bleeding in setting of Lovenox from recent postoperative hip fracture, question hemorrhoidal  Hematuria  Leukocytosis, on outpatient antibiotics for ?UTI  Acute renal failure suspect multifactorial with prerenal from decreased p.o. intake as well as significant urinary retention of over 1.3 L  Urinary retention requiring francois catheter, with hx of BPH  Terminal/Advanced Alzheimer's dementia essentially nonverbal at baseline, with behavioral disturbances  COPD, suspected undiagnosed, extensive smoking history, however requiring question 3 L at time of recent discharge  Diastolic CHF  History of hypertension  Iron deficiency anemia  Macrocytic  anemia  Gout    Continue current as needed medications, will add scheduled dosing given intermittent restlessness to try and help with symptoms.  With worsening hypoxia/vitals/no p.o. intake suspect will pass within the next 1 to 2 days  Atropine prn secretions  Haloperidol prn agitation/nausea/vomiting  Lorazepam intensol prn anxiety/restlessness  Morphine prn pain/comfort/air hunger; monitor response to treatment and adjust as needed based on continued response   discontinued non-essential medications/medications not focused for comfort medications and vital signs  francois prn comfort  palliative care consult/hospice  Diet: comfort feeds  Code: DNR comfort care measures only  Dispo: pending hospice evaluation/safe dispo        DVT prophylaxis:  Mechanical DVT prophylaxis orders are present.      Code Status (Patient has no pulse and is not breathing): No CPR (Do Not Attempt to Resuscitate)  Medical Interventions (Patient has pulse or is breathing): Comfort Measures

## 2024-04-30 NOTE — NURSING NOTE
Patient is currently on 4nt- patient is comfort measures only. At time of visit patients wife and friend at bedside. Patient appears comfortable during visit, no signs/symptoms of discomfort noted. Emotional support provided. Palliative care will continue to follow to support and assist.    Ondina VANG, RN, [facility]  Palliative Care

## 2024-04-30 NOTE — PLAN OF CARE
Goal Outcome Evaluation:           Progress: declining  Outcome Evaluation: Pt remained A&Ox0 this shift. Also, pt remained on room air. Pt was medicated with PRN Morphine and Ativan to help relieve pain and achieve comfort. Family remained at bedside. Comfort care provided.

## 2024-05-01 NOTE — NURSING NOTE
Palliative care visit for evaluation of comfort measures only. At time of visit patient appears comfortable- no signs/symptoms of verbal or nonverbal discomfort. Patients spouse at bedside- emotional support provided. Patient does appear to be in active stages of dying as evidenced by patients minimal responsiveness, mottling noted to bilateral feet, and increased secretions. Provided education on signs/symptoms of end of life with patients spouse. Patient to remain inpatient with continued end of life care/comfort measures only.    Ondina VANG, RN, PN  Palliative Care

## 2024-05-01 NOTE — PROGRESS NOTES
[facility]   Hospitalist Progress Note    Date of admission: 4/29/2024  Patient Name: Clark Layton  1940  Date: 5/1/2024      Subjective     Chief Complaint   Patient presents with    Rectal Bleeding       Interval Followup: Resting peacefully. Wife updated at bedside. Using some prns      Objective     Vitals:   Temp:  [98.2 °F (36.8 °C)] 98.2 °F (36.8 °C)  Heart Rate:  [] 109  Resp:  [14-16] 16  BP: ()/(44-50) 93/44    Physical Exam  Ill-appearing in bed thin frail dry mucosa, nonlabored respirations on room air symmetric chest rise  Abdomen nondistended  Crowder catheter with yellow urine output slightly concentrated appearance      Result Review:  Vital signs, labs and recent relevant imaging reviewed.      CBC          4/4/2024    03:06 4/26/2024    19:02 4/29/2024    03:19   CBC   WBC 13.61  11.45  15.78    RBC 2.92  3.35  3.02    Hemoglobin 9.1  10.4  9.6    Hematocrit 30.5  32.1  32.2    .5  95.8  106.6    MCH 31.2  31.0  31.8    MCHC 29.8  32.4  29.8    RDW 13.7  13.9  14.8    Platelets 274  223  187      CMP          4/4/2024    03:06 4/26/2024    19:02 4/29/2024    03:19   CMP   Glucose 141  150  150    BUN 40  24  28    Creatinine 1.03  1.11  1.52    EGFR 73.0  66.7  45.7    Sodium 139  143  139    Potassium 3.7  3.6  4.1    Chloride 97  103  103    Calcium 8.9  9.0  8.8    Total Protein 6.2  6.0  6.7    Albumin 2.9  3.5  3.0    Globulin 3.3  2.5  3.7    Total Bilirubin 0.3  0.7  0.3    Alkaline Phosphatase 82  90  82    AST (SGOT) 41  13  14    ALT (SGPT) 36  6  10    Albumin/Globulin Ratio 0.9  1.4  0.8    BUN/Creatinine Ratio 38.8  21.6  18.4    Anion Gap 6.4  11.1  9.4          acetaminophen    aluminum-magnesium hydroxide-simethicone    atropine    [DISCONTINUED] senna-docusate sodium **AND** [DISCONTINUED] polyethylene glycol **AND** bisacodyl **AND** bisacodyl    Diclofenac Sodium    haloperidol **OR** haloperidol **OR** haloperidol lactate    hydrOXYzine    Lidocaine     LORazepam **OR** LORazepam **OR** LORazepam **OR** LORazepam **OR** LORazepam **OR** LORazepam    melatonin    Morphine **OR** morphine    nicotine    ondansetron    polyethylene glycol    sodium chloride    sodium chloride    sodium chloride    LORazepam, 1 mg, Oral, 4x Daily  morphine sulfate (concentrate), 10 mg, Oral, 4x Daily  pantoprazole, 40 mg, Intravenous, Q AM        No radiology results for the last 7 days    Assessment / Plan     Summary: 83-year-old male with advanced Alzheimer's dementia in a nursing home, nonverbal baseline, recent discharge 4/4 with fall with left femoral neck fracture treated with left Turner arthroplasty on 3/27, hospital course at that time complicated by BÁRBARA which had resolved by discharge, suspected undiagnosed COPD (requiring oxygen at discharge), dCHF (unclear if discharged on diuretics).    Patient send from NH with new rectal bleeding, found to have bleeding from catheter and straight cath initially, worsening mentation and worsening Alzheimer's dementia.  Ultimately after discussion further with patient's wife transition to hospice/comfort measures only    Assessment/Plan (clinically significant if listed here)  Suspected lower GI bleeding in setting of Lovenox from recent postoperative hip fracture, question hemorrhoidal  Hematuria  Leukocytosis, on outpatient antibiotics for ?UTI  Acute renal failure suspect multifactorial with prerenal from decreased p.o. intake as well as significant urinary retention of over 1.3 L  Urinary retention requiring francois catheter, with hx of BPH  Terminal/Advanced Alzheimer's dementia essentially nonverbal at baseline, with behavioral disturbances  COPD, suspected undiagnosed, extensive smoking history, however requiring question 3 L at time of recent discharge  Diastolic CHF  History of hypertension  Iron deficiency anemia  Macrocytic anemia  Gout    Continue current as needed medications, cont scheduled dosing given intermittent  restlessness to try and help with symptoms.  With worsening hypoxia/vitals/no p.o. intake suspect will pass within the next 1 to 2 days  Atropine prn secretions  Haloperidol prn agitation/nausea/vomiting  Lorazepam intensol prn anxiety/restlessness  Morphine prn pain/comfort/air hunger; monitor response to treatment and adjust as needed based on continued response   discontinued non-essential medications/medications not focused for comfort medications and vital signs  francois prn comfort  palliative care consult/hospice  Diet: comfort feeds  Code: DNR comfort care measures only  Dispo: pending hospice evaluation/safe dispo        DVT prophylaxis:  Mechanical DVT prophylaxis orders are present.      Code Status (Patient has no pulse and is not breathing): No CPR (Do Not Attempt to Resuscitate)  Medical Interventions (Patient has pulse or is breathing): Comfort Measures

## 2024-05-02 NOTE — DISCHARGE SUMMARY
Physician Death Summary    Patient Identification  PATIENT IDENTIFICATION    Name: Clark Layton  :  1940  MRN: [id number]    Admit date: 2024    Date of Death: 2024    Admitting Physician: Bryan Khan MD     Discharge Physician: Ruddy Jean Baptiste MD     Admission Diagnoses: Lower GI bleed [K92.2]    Hospital Problems:   Principal Problem:  Rectal bleeding   Active Problems:  Problems Addressed this Visit          Gastrointestinal Abdominal     Lower GI bleed - Primary     Diagnoses         Codes Comments    Lower GI bleed    -  Primary ICD-10-CM: K92.2  ICD-9-CM: 578.9              Discharged Condition:      Consults: IP CONSULT TO PALLIATIVE CARE TEAM  IP CONSULT TO SPIRITUAL CARE    Imaging:   Imaging Results (Last 24 Hours)       ** No results found for the last 24 hours. **            Labs:   Lab Results (last 24 hours)       ** No results found for the last 24 hours. **              Hospital Course:   83-year-old male with advanced Alzheimer's dementia in a nursing home, nonverbal baseline, recent discharge  with fall with left femoral neck fracture treated with left Turner arthroplasty on 3/27, hospital course at that time complicated by BÁRBARA which had resolved by discharge, suspected undiagnosed COPD (requiring oxygen at discharge), dCHF (unclear if discharged on diuretics). Patient send from NH with new rectal bleeding, found to have bleeding from catheter and straight cath initially, worsening mentation and worsening Alzheimer's dementia. Ultimately after discussion further with patient's wife transition to hospice/comfort measures only. He passed away on 2024      Disposition:        Signed:  Pravin Jean Baptiste MD  Hospitalist Group  2024  16:37 EDT

## 2024-05-04 LAB
BACTERIA SPEC AEROBE CULT: NORMAL
BACTERIA SPEC AEROBE CULT: NORMAL

## (undated) DEVICE — GLV SURG SENSICARE PI LF PF 7.0

## (undated) DEVICE — BIPOLAR SEALER 23-112-1 AQM 6.0: Brand: AQUAMANTYS™

## (undated) DEVICE — SUT VIC 2/0 CT1 36IN

## (undated) DEVICE — GLOVE,SURG,SENSICARE SLT,LF,PF,6: Brand: MEDLINE

## (undated) DEVICE — TOWEL,OR,DSP,ST,BLUE,STD,4/PK,20PK/CS: Brand: MEDLINE

## (undated) DEVICE — PENCL E/S HNDSWCH ROCKR CB

## (undated) DEVICE — ZIPPERED TOGA, PEEL-AWAY 3X LARGE: Brand: FLYTE, SURGICOOL

## (undated) DEVICE — PROXIMATE RH ROTATING HEAD SKIN STAPLERS (35 WIDE) CONTAINS 35 STAINLESS STEEL STAPLES: Brand: PROXIMATE

## (undated) DEVICE — ANTIBACTERIAL VIOLET BRAIDED (POLYGLACTIN 910), SYNTHETIC ABSORBABLE SURGICAL SUTURE: Brand: COATED VICRYL

## (undated) DEVICE — GAUZE,SPONGE,4"X4",16PLY,STRL,LF,10/TRAY: Brand: MEDLINE

## (undated) DEVICE — TOTAL ANTERIOR HIP-LF: Brand: MEDLINE INDUSTRIES, INC.

## (undated) DEVICE — MEDI-VAC NON-CONDUCTIVE SUCTION TUBING: Brand: CARDINAL HEALTH

## (undated) DEVICE — 450 ML BOTTLE OF 0.05% CHLORHEXIDINE GLUCONATE IN 99.95% STERILE WATER FOR IRRIGATION, USP AND APPLICATOR.: Brand: IRRISEPT ANTIMICROBIAL WOUND LAVAGE

## (undated) DEVICE — CVR LEG BOOTLEG F/R NOSKID 33IN

## (undated) DEVICE — GLV SURG BIOGEL LTX PF 8 1/2

## (undated) DEVICE — GLV SURG SENSICARE PI PF LF 7 GRN STRL

## (undated) DEVICE — KT PT POSITION SUPINE HANA/PROFX TABL

## (undated) DEVICE — SYS MIX CLEARMIX SGL/DBL VAC

## (undated) DEVICE — MAT FLR ABS W/BLU/LINER 56X72IN WHT

## (undated) DEVICE — DRSNG SURG AQUACEL AG/ADVNTGE 9X25CM 3.5X10IN

## (undated) DEVICE — SLV SCD KN/LEN ADJ EXPRSS BLENDED MD 1P/U

## (undated) DEVICE — ELECTRD BLD EDGE COAT 3IN

## (undated) DEVICE — APPL CHLORAPREP HI/LITE 26ML ORNG

## (undated) DEVICE — GLOVE,SURG,SENSICARE SLT,LF,PF,8.5: Brand: MEDLINE

## (undated) DEVICE — Device: Brand: COVER, PERINEAL POST, 12 PK

## (undated) DEVICE — PULLOVER TOGA, 2X LARGE: Brand: FLYTE, SURGICOOL

## (undated) DEVICE — SOL IRR NACL 0.9PCT BT 1000ML

## (undated) DEVICE — STRYKER PERFORMANCE SERIES SAGITTAL BLADE: Brand: STRYKER PERFORMANCE SERIES